# Patient Record
Sex: FEMALE | Race: WHITE | NOT HISPANIC OR LATINO
[De-identification: names, ages, dates, MRNs, and addresses within clinical notes are randomized per-mention and may not be internally consistent; named-entity substitution may affect disease eponyms.]

---

## 2018-03-08 ENCOUNTER — APPOINTMENT (OUTPATIENT)
Dept: SPINE | Facility: CLINIC | Age: 63
End: 2018-03-08
Payer: COMMERCIAL

## 2018-03-08 ENCOUNTER — INPATIENT (INPATIENT)
Facility: HOSPITAL | Age: 63
LOS: 6 days | Discharge: NURSING, REHAB OR LONG-TERM CARE | DRG: 95 | End: 2018-03-15
Attending: PSYCHIATRY & NEUROLOGY | Admitting: INTERNAL MEDICINE
Payer: COMMERCIAL

## 2018-03-08 VITALS
HEIGHT: 65 IN | BODY MASS INDEX: 24.99 KG/M2 | DIASTOLIC BLOOD PRESSURE: 70 MMHG | WEIGHT: 150 LBS | SYSTOLIC BLOOD PRESSURE: 120 MMHG

## 2018-03-08 VITALS
HEART RATE: 76 BPM | OXYGEN SATURATION: 97 % | TEMPERATURE: 99 F | WEIGHT: 149.91 LBS | SYSTOLIC BLOOD PRESSURE: 163 MMHG | DIASTOLIC BLOOD PRESSURE: 90 MMHG | RESPIRATION RATE: 18 BRPM

## 2018-03-08 DIAGNOSIS — Z29.9 ENCOUNTER FOR PROPHYLACTIC MEASURES, UNSPECIFIED: ICD-10-CM

## 2018-03-08 DIAGNOSIS — Z82.69 FAMILY HISTORY OF OTHER DISEASES OF THE MUSCULOSKELETAL SYSTEM AND CONNECTIVE TISSUE: ICD-10-CM

## 2018-03-08 DIAGNOSIS — D32.9 BENIGN NEOPLASM OF MENINGES, UNSPECIFIED: ICD-10-CM

## 2018-03-08 DIAGNOSIS — M54.16 RADICULOPATHY, LUMBAR REGION: ICD-10-CM

## 2018-03-08 DIAGNOSIS — F32.9 MAJOR DEPRESSIVE DISORDER, SINGLE EPISODE, UNSPECIFIED: ICD-10-CM

## 2018-03-08 DIAGNOSIS — N94.10 UNSPECIFIED DYSPAREUNIA: ICD-10-CM

## 2018-03-08 DIAGNOSIS — R20.0 ANESTHESIA OF SKIN: ICD-10-CM

## 2018-03-08 DIAGNOSIS — E78.5 HYPERLIPIDEMIA, UNSPECIFIED: ICD-10-CM

## 2018-03-08 DIAGNOSIS — Z98.890 OTHER SPECIFIED POSTPROCEDURAL STATES: Chronic | ICD-10-CM

## 2018-03-08 LAB
ALBUMIN SERPL ELPH-MCNC: 4.3 G/DL — SIGNIFICANT CHANGE UP (ref 3.3–5)
ALP SERPL-CCNC: 90 U/L — SIGNIFICANT CHANGE UP (ref 40–120)
ALT FLD-CCNC: 47 U/L RC — HIGH (ref 10–45)
ANION GAP SERPL CALC-SCNC: 10 MMOL/L — SIGNIFICANT CHANGE UP (ref 5–17)
APTT BLD: 30.1 SEC — SIGNIFICANT CHANGE UP (ref 27.5–37.4)
AST SERPL-CCNC: 35 U/L — SIGNIFICANT CHANGE UP (ref 10–40)
BASOPHILS # BLD AUTO: 0 K/UL — SIGNIFICANT CHANGE UP (ref 0–0.2)
BASOPHILS NFR BLD AUTO: 0.6 % — SIGNIFICANT CHANGE UP (ref 0–2)
BILIRUB SERPL-MCNC: 0.5 MG/DL — SIGNIFICANT CHANGE UP (ref 0.2–1.2)
BUN SERPL-MCNC: 14 MG/DL — SIGNIFICANT CHANGE UP (ref 7–23)
CALCIUM SERPL-MCNC: 9.7 MG/DL — SIGNIFICANT CHANGE UP (ref 8.4–10.5)
CHLORIDE SERPL-SCNC: 108 MMOL/L — SIGNIFICANT CHANGE UP (ref 96–108)
CO2 SERPL-SCNC: 26 MMOL/L — SIGNIFICANT CHANGE UP (ref 22–31)
CREAT SERPL-MCNC: 0.88 MG/DL — SIGNIFICANT CHANGE UP (ref 0.5–1.3)
EOSINOPHIL # BLD AUTO: 0.1 K/UL — SIGNIFICANT CHANGE UP (ref 0–0.5)
EOSINOPHIL NFR BLD AUTO: 3 % — SIGNIFICANT CHANGE UP (ref 0–6)
ERYTHROCYTE [SEDIMENTATION RATE] IN BLOOD: 12 MM/HR — SIGNIFICANT CHANGE UP (ref 0–20)
GLUCOSE SERPL-MCNC: 102 MG/DL — HIGH (ref 70–99)
HCT VFR BLD CALC: 39.1 % — SIGNIFICANT CHANGE UP (ref 34.5–45)
HGB BLD-MCNC: 13.4 G/DL — SIGNIFICANT CHANGE UP (ref 11.5–15.5)
INR BLD: 0.92 RATIO — SIGNIFICANT CHANGE UP (ref 0.88–1.16)
LYMPHOCYTES # BLD AUTO: 1.7 K/UL — SIGNIFICANT CHANGE UP (ref 1–3.3)
LYMPHOCYTES # BLD AUTO: 38.6 % — SIGNIFICANT CHANGE UP (ref 13–44)
MAGNESIUM SERPL-MCNC: 2.2 MG/DL — SIGNIFICANT CHANGE UP (ref 1.6–2.6)
MCHC RBC-ENTMCNC: 32.8 PG — SIGNIFICANT CHANGE UP (ref 27–34)
MCHC RBC-ENTMCNC: 34.3 GM/DL — SIGNIFICANT CHANGE UP (ref 32–36)
MCV RBC AUTO: 95.8 FL — SIGNIFICANT CHANGE UP (ref 80–100)
MONOCYTES # BLD AUTO: 0.5 K/UL — SIGNIFICANT CHANGE UP (ref 0–0.9)
MONOCYTES NFR BLD AUTO: 11.8 % — SIGNIFICANT CHANGE UP (ref 2–14)
NEUTROPHILS # BLD AUTO: 2 K/UL — SIGNIFICANT CHANGE UP (ref 1.8–7.4)
NEUTROPHILS NFR BLD AUTO: 46 % — SIGNIFICANT CHANGE UP (ref 43–77)
PHOSPHATE SERPL-MCNC: 3.1 MG/DL — SIGNIFICANT CHANGE UP (ref 2.5–4.5)
PLATELET # BLD AUTO: 237 K/UL — SIGNIFICANT CHANGE UP (ref 150–400)
POTASSIUM SERPL-MCNC: 4.6 MMOL/L — SIGNIFICANT CHANGE UP (ref 3.5–5.3)
POTASSIUM SERPL-SCNC: 4.6 MMOL/L — SIGNIFICANT CHANGE UP (ref 3.5–5.3)
PROT SERPL-MCNC: 7.4 G/DL — SIGNIFICANT CHANGE UP (ref 6–8.3)
PROTHROM AB SERPL-ACNC: 10 SEC — SIGNIFICANT CHANGE UP (ref 9.8–12.7)
RBC # BLD: 4.08 M/UL — SIGNIFICANT CHANGE UP (ref 3.8–5.2)
RBC # FLD: 12.5 % — SIGNIFICANT CHANGE UP (ref 10.3–14.5)
SODIUM SERPL-SCNC: 144 MMOL/L — SIGNIFICANT CHANGE UP (ref 135–145)
WBC # BLD: 4.4 K/UL — SIGNIFICANT CHANGE UP (ref 3.8–10.5)
WBC # FLD AUTO: 4.4 K/UL — SIGNIFICANT CHANGE UP (ref 3.8–10.5)

## 2018-03-08 PROCEDURE — 93010 ELECTROCARDIOGRAM REPORT: CPT

## 2018-03-08 PROCEDURE — 99203 OFFICE O/P NEW LOW 30 MIN: CPT

## 2018-03-08 PROCEDURE — 99222 1ST HOSP IP/OBS MODERATE 55: CPT | Mod: AI,GC

## 2018-03-08 PROCEDURE — 99285 EMERGENCY DEPT VISIT HI MDM: CPT

## 2018-03-08 RX ORDER — ACETAMINOPHEN 500 MG
650 TABLET ORAL EVERY 6 HOURS
Qty: 0 | Refills: 0 | Status: DISCONTINUED | OUTPATIENT
Start: 2018-03-08 | End: 2018-03-15

## 2018-03-08 RX ORDER — CHOLECALCIFEROL (VITAMIN D3) 125 MCG
2000 CAPSULE ORAL EVERY 24 HOURS
Qty: 0 | Refills: 0 | Status: DISCONTINUED | OUTPATIENT
Start: 2018-03-08 | End: 2018-03-15

## 2018-03-08 RX ORDER — BUPROPION HYDROCHLORIDE 150 MG/1
225 TABLET, EXTENDED RELEASE ORAL DAILY
Qty: 0 | Refills: 0 | Status: DISCONTINUED | OUTPATIENT
Start: 2018-03-08 | End: 2018-03-08

## 2018-03-08 RX ORDER — BUPROPION HYDROCHLORIDE 150 MG/1
150 TABLET, EXTENDED RELEASE ORAL DAILY
Qty: 0 | Refills: 0 | Status: DISCONTINUED | OUTPATIENT
Start: 2018-03-08 | End: 2018-03-12

## 2018-03-08 RX ORDER — HEPARIN SODIUM 5000 [USP'U]/ML
5000 INJECTION INTRAVENOUS; SUBCUTANEOUS EVERY 8 HOURS
Qty: 0 | Refills: 0 | Status: DISCONTINUED | OUTPATIENT
Start: 2018-03-08 | End: 2018-03-15

## 2018-03-08 RX ORDER — CITALOPRAM 10 MG/1
10 TABLET, FILM COATED ORAL DAILY
Qty: 0 | Refills: 0 | Status: DISCONTINUED | OUTPATIENT
Start: 2018-03-08 | End: 2018-03-15

## 2018-03-08 RX ORDER — SIMVASTATIN 20 MG/1
10 TABLET, FILM COATED ORAL AT BEDTIME
Qty: 0 | Refills: 0 | Status: DISCONTINUED | OUTPATIENT
Start: 2018-03-08 | End: 2018-03-15

## 2018-03-08 RX ADMIN — Medication 25 MILLIGRAM(S): at 23:04

## 2018-03-08 RX ADMIN — CITALOPRAM 10 MILLIGRAM(S): 10 TABLET, FILM COATED ORAL at 23:04

## 2018-03-08 RX ADMIN — BUPROPION HYDROCHLORIDE 150 MILLIGRAM(S): 150 TABLET, EXTENDED RELEASE ORAL at 23:52

## 2018-03-08 RX ADMIN — SIMVASTATIN 10 MILLIGRAM(S): 20 TABLET, FILM COATED ORAL at 23:04

## 2018-03-08 RX ADMIN — Medication 2000 UNIT(S): at 23:04

## 2018-03-08 NOTE — H&P ADULT - HISTORY OF PRESENT ILLNESS
61 yo woman who presents to [facility] on referral by her private neurologist, Dr. Barrios, w/ 1 day onset of gait instability w/ b/l LE weakness. This was preceeded by an URI (2/12/18) while traveling in [facility], treated w/ doxycyline, followed by b/l peripheral neuropathy (tingling) in finger and toes (2/16/18), followed by numbness in the abdominal region (3/5/18). Patient's symptoms refractory to meloxicam and lyrica, which was started 2 days ago. Pertinent hx includes meningioma s/p craniotomy (1993) w/ regular MRI every other year, cervical stenosis (seen on MRI c-spine).     HLD  martinez s/p crani  MDD    post-menopausal

## 2018-03-08 NOTE — ED PROVIDER NOTE - MEDICAL DECISION MAKING DETAILS
61 yo female with numbness tingling; rule out gbs sent in by neurologist; will obtain labs vital capacity nif --> admit

## 2018-03-08 NOTE — H&P ADULT - NSHPLABSRESULTS_GEN_ALL_CORE
LABS personally reviewed by me:    WBC = 4.4    Hb = 13.4    BUN/Cr = 14/0.88    ESR pending      RADIOLOGY:    Outside MRI of Cervical Spine - thyroid lesions, spinal stenosis at multiple levels in cervical spine

## 2018-03-08 NOTE — H&P ADULT - PROBLEM SELECTOR PLAN 1
Pt with numbness and tingling in extremities and in abdomen, unclear etiology, but may be related to spinal stenosis seen on cervical spine MRI; for now pt being continued on lyrica  -on exam pt had decreased reflexes, decreased sensation in lle - possible concern for GBS, neurology is following, case discussed with neurology team  who recommended NIF and vital capacity q4 hrs, and neurochecks q 4hrs, which have been ordered;  will f/u neurology recommendations and defer to their specialized recs  - as per discussion with neuro, also ordered MRI of brain and MRI of thoracic and lumbar spine, will f/u on results, CT head results pending  -f/u ESR/CRP

## 2018-03-08 NOTE — CONSULT NOTE ADULT - SUBJECTIVE AND OBJECTIVE BOX
Neurology Consult    Name: ROSETTE GONZALEZ    HPI: 63 yo woman who presents to [facility] on referral by her private neurologist, Dr. Barrios, w/ 1 day onset of gait instability w/ b/l LE hypo-reflexia. This was preceeded by an URI (2/12/18) while traveling in [facility], treated w/ doxycyline, followed by b/l peripheral neuropathy (tingling) in finger and toes (2/16/18), followed by numbness in the abdominal region (3/5/18). Patient's symptoms refractory to meloxicam and lyrica, which was started 2 days ago. Pertinent hx includes meningioma s/p craniotomy (1993) w/ regular MRI every other year, cervical stenosis (seen on MRI c-spine).     PMH/PSH:   HLD  meningioma s/p craniotomy w/ residual right patellar hyper-reflexia   MDD    post-menopausal     MEDICATIONS  (STANDING):  buPROPion  milliGRAM(s) Oral daily  cholecalciferol 2000 Unit(s) Oral every 24 hours  citalopram 10 milliGRAM(s) Oral daily  heparin  Injectable 5000 Unit(s) SubCutaneous every 8 hours  Ospemifene (Osphena) 60 milliGRAM(s) 60 milliGRAM(s) Oral daily  pregabalin 25 milliGRAM(s) Oral every 24 hours  simvastatin 10 milliGRAM(s) Oral at bedtime    MEDICATIONS  (PRN):  acetaminophen   Tablet 650 milliGRAM(s) Oral every 6 hours PRN For Temp greater than 38 C (100.4 F)  acetaminophen   Tablet. 650 milliGRAM(s) Oral every 6 hours PRN Mild Pain (1 - 3)    Allergies  No Known Allergies    Objective:   Vital Signs Last 24 Hrs  T(C): 37.2 (08 Mar 2018 13:19), Max: 37.2 (08 Mar 2018 13:19)  T(F): 99 (08 Mar 2018 13:19), Max: 99 (08 Mar 2018 13:19)  HR: 76 (08 Mar 2018 13:19) (76 - 76)  BP: 163/90 (08 Mar 2018 13:19) (163/90 - 163/90)  RR: 18 (08 Mar 2018 13:19) (18 - 18)    General Exam:   General appearance: No acute distress                   Neurological Exam:  Mental Status: AAOx3, fluent speech, follows commands    Cranial Nerves: EOMI no nystagmus or diplopia, PERRL, V1-V3 intact, facial symmetry intact, no dysarthria, tongue midline, VFF    Motor: 5/5 throughout. No drift x4    Sensation: Intact to LT and pinprick throughout    Coordination: FTN intact b/l    Reflexes: normal bilateral biceps, brachioradialis, diminished b/l patellar and ankle    Gait: ataxic gait (to the right side). + Romberg.     Other: proprioception decreased right LE vs. normal right LE.       Labs:    03-08    144  |  108  |  14  ----------------------------<  102<H>  4.6   |  26  |  0.88    Ca    9.7      08 Mar 2018 15:02  Phos  3.1     03-08  Mg     2.2     03-08    TPro  7.4  /  Alb  4.3  /  TBili  0.5  /  DBili  x   /  AST  35  /  ALT  47<H>  /  AlkPhos  90  03-08    LIVER FUNCTIONS - ( 08 Mar 2018 15:02 )  Alb: 4.3 g/dL / Pro: 7.4 g/dL / ALK PHOS: 90 U/L / ALT: 47 U/L RC / AST: 35 U/L / GGT: x           CBC Full  -  ( 08 Mar 2018 15:02 )  WBC Count : 4.4 K/uL  Hemoglobin : 13.4 g/dL  Hematocrit : 39.1 %  Platelet Count - Automated : 237 K/uL  Mean Cell Volume : 95.8 fl  Mean Cell Hemoglobin : 32.8 pg  Mean Cell Hemoglobin Concentration : 34.3 gm/dL  Auto Neutrophil # : 2.0 K/uL  Auto Lymphocyte # : 1.7 K/uL  Auto Monocyte # : 0.5 K/uL  Auto Eosinophil # : 0.1 K/uL  Auto Basophil # : 0.0 K/uL  Auto Neutrophil % : 46.0 %  Auto Lymphocyte % : 38.6 %  Auto Monocyte % : 11.8 %  Auto Eosinophil % : 3.0 %  Auto Basophil % : 0.6 %    Radiology Neurology Consult    Name: ROSETTE GONZALEZ    HPI: 63 yo woman who presents to [facility] on referral by her private neurologist, Dr. Barrios, w/ 1 day onset of gait instability w/ b/l LE hypo-reflexia. This was preceeded by an URI (2/12/18) while traveling in [facility], treated w/ doxycyline, followed by b/l peripheral neuropathy (tingling) in finger and toes (2/16/18), followed by numbness in the abdominal region (3/5/18). Patient's symptoms refractory to meloxicam and lyrica, which was started 2 days ago. Pertinent hx includes meningioma s/p craniotomy (1993) w/ regular MRI every other year, cervical stenosis (seen on MRI c-spine). Patient noted that while on her trip in [place], she did go skiing w/ multiple subsequent falls however does not recall any particular falls resulting in any significant trauma..     PMH/PSH:   HLD  meningioma s/p craniotomy w/ residual right patellar hyper-reflexia   MDD    post-menopausal     MEDICATIONS  (STANDING):  buPROPion  milliGRAM(s) Oral daily  cholecalciferol 2000 Unit(s) Oral every 24 hours  citalopram 10 milliGRAM(s) Oral daily  heparin  Injectable 5000 Unit(s) SubCutaneous every 8 hours  Ospemifene (Osphena) 60 milliGRAM(s) 60 milliGRAM(s) Oral daily  pregabalin 25 milliGRAM(s) Oral every 24 hours  simvastatin 10 milliGRAM(s) Oral at bedtime    MEDICATIONS  (PRN):  acetaminophen   Tablet 650 milliGRAM(s) Oral every 6 hours PRN For Temp greater than 38 C (100.4 F)  acetaminophen   Tablet. 650 milliGRAM(s) Oral every 6 hours PRN Mild Pain (1 - 3)    Allergies  No Known Allergies    Objective:   Vital Signs Last 24 Hrs  T(C): 37.2 (08 Mar 2018 13:19), Max: 37.2 (08 Mar 2018 13:19)  T(F): 99 (08 Mar 2018 13:19), Max: 99 (08 Mar 2018 13:19)  HR: 76 (08 Mar 2018 13:19) (76 - 76)  BP: 163/90 (08 Mar 2018 13:19) (163/90 - 163/90)  RR: 18 (08 Mar 2018 13:19) (18 - 18)    General Exam:   General appearance: No acute distress                   Neurological Exam:  Mental Status: AAOx3, fluent speech, follows commands    Cranial Nerves: EOMI no nystagmus or diplopia, PERRL, V1-V3 intact, facial symmetry intact, no dysarthria, tongue midline, VFF    Motor: 5/5 throughout. No drift x4    Sensation: Intact to LT and pinprick throughout    Coordination: FTN intact b/l    Reflexes: normal bilateral biceps, brachioradialis, diminished b/l patellar and ankle    Gait: ataxic gait (to the right side). + Romberg.     Other: proprioception decreased right LE vs. normal right LE.       Labs:    03-08    144  |  108  |  14  ----------------------------<  102<H>  4.6   |  26  |  0.88    Ca    9.7      08 Mar 2018 15:02  Phos  3.1     03-08  Mg     2.2     03-08    TPro  7.4  /  Alb  4.3  /  TBili  0.5  /  DBili  x   /  AST  35  /  ALT  47<H>  /  AlkPhos  90  03-08    LIVER FUNCTIONS - ( 08 Mar 2018 15:02 )  Alb: 4.3 g/dL / Pro: 7.4 g/dL / ALK PHOS: 90 U/L / ALT: 47 U/L RC / AST: 35 U/L / GGT: x           CBC Full  -  ( 08 Mar 2018 15:02 )  WBC Count : 4.4 K/uL  Hemoglobin : 13.4 g/dL  Hematocrit : 39.1 %  Platelet Count - Automated : 237 K/uL  Mean Cell Volume : 95.8 fl  Mean Cell Hemoglobin : 32.8 pg  Mean Cell Hemoglobin Concentration : 34.3 gm/dL  Auto Neutrophil # : 2.0 K/uL  Auto Lymphocyte # : 1.7 K/uL  Auto Monocyte # : 0.5 K/uL  Auto Eosinophil # : 0.1 K/uL  Auto Basophil # : 0.0 K/uL  Auto Neutrophil % : 46.0 %  Auto Lymphocyte % : 38.6 %  Auto Monocyte % : 11.8 %  Auto Eosinophil % : 3.0 %  Auto Basophil % : 0.6 %    Radiology

## 2018-03-08 NOTE — CONSULT NOTE ADULT - ASSESSMENT
63 yo woman who presents to [facility] on referral by her private neurologist, Dr. Barrios, w/ 1 day onset of gait instability w/ b/l LE hypo-reflexia. This was preceeded by an URI (2/12/18) while traveling in [facility], treated w/ doxycyline, followed by b/l peripheral neuropathy (tingling) in finger and toes (2/16/18), followed by numbness in the abdominal region (3/5/18). Patient's symptoms refractory to meloxicam and lyrica, which was started 2 days ago. Pertinent hx includes meningioma s/p craniotomy (1993) w/ regular MRI every other year, cervical stenosis (seen on MRI c-spine). 61 yo woman who presents to [facility] on referral by her private neurologist, Dr. Barrios, w/ 1 day onset of gait instability w/ b/l LE hypo-reflexia. This was preceeded by an URI (2/12/18) while traveling in [facility], treated w/ doxycyline, followed by b/l peripheral neuropathy (tingling) in finger and toes (2/16/18), followed by numbness in the abdominal region (3/5/18). Patient's symptoms refractory to meloxicam and lyrica, which was started 2 days ago. Pertinent hx includes meningioma s/p craniotomy (1993) w/ regular MRI every other year, cervical stenosis (seen on MRI c-spine). Patient noted that while on her trip in [place], she did go skiing w/ multiple subsequent falls however does not recall any particular falls resulting in any significant trauma.

## 2018-03-08 NOTE — H&P ADULT - HISTORY OF PRESENT ILLNESS
63 y/o f w/ PMH of meningioma 63 y/o f w/ PMH of meningioma s/p craniotomy, HLD, depression p/w symptoms of numbness and tingling in hands and feet for the last couple of weeks, complicated by weakness, unsteadiness, and difficulty walking.  Pt initially had upper respiratory symptoms while in [place], and was diagnosed at a walk-in-clinic with a walking pneumonia, for which she was started on doxycycline.  Pt states that the symptoms of numbness and tingling started shortly after then.  They were initially localized ot her hands and feet, but eventually included her abdomen.  She did not have any n/v, diarrhea.  She noticed in the last few days she was also weaker, and had more difficulty with ambulation, and tingling and numbness had gotten worse.  She went ot he her neurosurgeon, who found on reflex exam, that reflexes that are usually brisk for her were diminished.  She had an outpt MRI of cervical spine that showed multiple areas of stenoses.  Dr. Weston was concerned about the possibility of GBS, and her sent to the ED for further work up.    In the ED, pt had a CT head, and was seen by neurology, who recommended MRI of head and T + L spine. 63 y/o f w/ PMH of meningioma s/p craniotomy, HLD, depression p/w symptoms of numbness and tingling in hands and feet for the last couple of weeks, complicated by weakness, unsteadiness, and difficulty walking.  Pt initially had upper respiratory symptoms while in [place], and was diagnosed at a walk-in-clinic with a walking pneumonia, for which she was started on doxycycline.  Pt states that the symptoms of numbness and tingling started shortly after then.  They were initially localized ot her hands and feet, but eventually included her abdomen.  She did not have any n/v, diarrhea.  She noticed in the last few days she was also weaker, and had more difficulty with ambulation, and tingling and numbness had gotten worse.  She went ot he her neurosurgeon, who found on reflex exam, that reflexes that are usually brisk for her were diminished.  She had an outpt MRI of cervical spine that showed multiple areas of stenoses.  Dr. Weston was concerned about the possibility of GBS, and sent her to the ED for further work up.    In the ED, pt had a CT head, and was seen by neurology, who recommended MRI of head and T + L spine.

## 2018-03-08 NOTE — H&P ADULT - FAMILY HISTORY
Father  Still living? Unknown  Family history of spinal stenosis, Age at diagnosis: Age Unknown  Family history of prostate cancer in father, Age at diagnosis: Age Unknown  Family history of cardiovascular disease, Age at diagnosis: Age Unknown     Mother  Still living? Unknown  Family history of cardiovascular disease, Age at diagnosis: Age Unknown

## 2018-03-08 NOTE — ED PROVIDER NOTE - ATTENDING CONTRIBUTION TO CARE
63 y/o female with a h/o distant meningioma who presents with complaints of numbness/tingling, first of her hands, then feet, then around her abdomen. This has more recently been associated with weakness and an unsteadiness while walking. Prior to onset of these symptoms, she had had a URI. Evaluated on an out-patient basis today by Neurology, she was referred to the ED for further evaluation. On exam, she appears very well and comfortable. VSs noted, neck supple, lungs CTA, cardiac sounds s/ audible m/r/g, abdomen soft, NT/ND, extremities s/ asymmetry, skin s/ rash and neurologically s/ focal deficit (reflexes not checked by me personally). Screening labs unremarkable. Discussed with her son a physician at [facility]. Patient is to be admitted for additional investigation/intervention with concern expressed for possible GBS. No clinical evidence at this time of respiratory compromise whatsoever. Suitable for admission to the IM floor.

## 2018-03-08 NOTE — ED ADULT TRIAGE NOTE - CHIEF COMPLAINT QUOTE
sent in by MD to r/o Archana Shetty, c/o numbness and tingling to hands x 3 weeks, now experiencing numbness and tingling to abdomen with unsteady gait.

## 2018-03-08 NOTE — CONSULT NOTE ADULT - PROBLEM SELECTOR RECOMMENDATION 9
based upon HPI and symptomatology, need to r/o demyelinating disease vs. other cord pathology (i.e. GBS, Transverse Myelitis)   Plan:  -Medicine team would like to admit patient to their service   -NIFs/VCs q4hrs   -frequent neuro checks  -MRI brain w/ and w/o contrast  -MRI T/L spine w/ and w/o contrast  -ESR/CRP  -pending MRI, may need further evaluation w/ LP if imaging inconclusive

## 2018-03-08 NOTE — ED ADULT NURSE NOTE - OBJECTIVE STATEMENT
Pt presents to Ed with c/o numbness and tingling sensation. Pt with hx of Pt presents to Ed with c/o numbness and tingling sensation. Pt with hx of spinal stenosis and meningioma craniotomy   in 1993 reports experiencing numbness and tingling sensation to upper arms and legs sent by md to be evaluated  for Guillain [place]. Pt A&Ox3 no chest pain sob, visual changes or diff urinating or breathing, pt reports unsteady gait.   Vital Capacity done in Ed, VC= 2.3L, NIF/- 50. RAC 20g ivl placed labs drawn and sent. Pt presents to Ed with c/o numbness and tingling sensation. Pt with hx of spinal stenosis and meningioma craniotomy   in 1993 reports experiencing numbness and tingling sensation to upper arms and legs sent by md to be evaluated  for Guillain [place]. Pt A&Ox3 no chest pain sob, visual changes or diff urinating or breathing, pt reports unsteady gait.   Vital Capacity done in Ed, VC= 2.3L, NIF/- 50. RAC 20g ivl placed labs drawn and sent. Patients speech clear, full strength   and sensation to b/l upper and lower extremities.

## 2018-03-08 NOTE — H&P ADULT - NSHPREVIEWOFSYSTEMS_GEN_ALL_CORE
+ numbness and tingling in extremities  + abdominal tingling and numbness  + weakness  + decreased sensation in lle    - cp, palpitations  - sob, cough  - n/v, diarrhea, constipation  - bleeding, epistaxsis  - allergies, rhinorrhea  - ha, vision changes

## 2018-03-08 NOTE — H&P ADULT - NSHPPHYSICALEXAM_GEN_ALL_CORE
Gen - NAD  HEENT - perrla, eomi  CV - s1 and s2, rrr, no m/r.g  LUNGS - CTAB  Abd - soft, nd, nt, +bs  Ext - no c/c/e  Neuro - decreased sensation in lle, decreased reflexes in lower ext, and decreased proprioception

## 2018-03-08 NOTE — ED PROVIDER NOTE - OBJECTIVE STATEMENT
61 yo female with hx of meningioma s/p craniotomy in 1993 (gets mri every other year and last one was this december17)    presenting with.  in week of 2/12, had upper respiratory infection.  came back from [place] on 2/9.  was put on doxy by urgent care on 2/16.  2/16, patient started having tingling on her fingers and feet.  3 days ago, patient started having numbness around her abdomen.  yesterday, patient started noticing that her gait was unsteady.  also endorses weakness which has gotten progressively worse.  took meloxicam two tabs twice a day and lyrica (started two days ago) with minimal relief of symptoms.  on wellbutrin, simvastatin, osphena and celexa.  had mri on 2/24 of cervical spine which showed significant canal stenosis.  denies recent trauma but went skiing in [place].  denies urinary incontinence.  pcp- mahin hansen   neurologist karina hawthorne

## 2018-03-08 NOTE — H&P ADULT - PROBLEM SELECTOR PLAN 4
cont pt on home wellbutrin and celexa; wellbutrin dose at home 225mg po qd, but pharmacy here does not carry dose, so ordered for 150mg po qd

## 2018-03-08 NOTE — H&P ADULT - ASSESSMENT
61 y/o f w/ PMH of meningioma s/p craniotomy, HLD, depression p/w symptoms of numbness and tingling in extremities, weakness, and difficulty ambulating

## 2018-03-09 DIAGNOSIS — E04.1 NONTOXIC SINGLE THYROID NODULE: ICD-10-CM

## 2018-03-09 LAB
ALBUMIN SERPL ELPH-MCNC: 3.5 G/DL — SIGNIFICANT CHANGE UP (ref 3.3–5)
ALP SERPL-CCNC: 69 U/L — SIGNIFICANT CHANGE UP (ref 40–120)
ALT FLD-CCNC: 40 U/L RC — SIGNIFICANT CHANGE UP (ref 10–45)
ANION GAP SERPL CALC-SCNC: 11 MMOL/L — SIGNIFICANT CHANGE UP (ref 5–17)
APPEARANCE CSF: CLEAR — SIGNIFICANT CHANGE UP
APPEARANCE CSF: CLEAR — SIGNIFICANT CHANGE UP
APPEARANCE SPUN FLD: COLORLESS — SIGNIFICANT CHANGE UP
APPEARANCE SPUN FLD: COLORLESS — SIGNIFICANT CHANGE UP
AST SERPL-CCNC: 35 U/L — SIGNIFICANT CHANGE UP (ref 10–40)
BASOPHILS # BLD AUTO: 0.04 K/UL — SIGNIFICANT CHANGE UP (ref 0–0.2)
BASOPHILS NFR BLD AUTO: 1 % — SIGNIFICANT CHANGE UP (ref 0–2)
BILIRUB SERPL-MCNC: 0.6 MG/DL — SIGNIFICANT CHANGE UP (ref 0.2–1.2)
BUN SERPL-MCNC: 14 MG/DL — SIGNIFICANT CHANGE UP (ref 7–23)
CALCIUM SERPL-MCNC: 8.8 MG/DL — SIGNIFICANT CHANGE UP (ref 8.4–10.5)
CHLORIDE SERPL-SCNC: 105 MMOL/L — SIGNIFICANT CHANGE UP (ref 96–108)
CO2 SERPL-SCNC: 24 MMOL/L — SIGNIFICANT CHANGE UP (ref 22–31)
COLOR CSF: SIGNIFICANT CHANGE UP
COLOR CSF: SIGNIFICANT CHANGE UP
CREAT SERPL-MCNC: 0.95 MG/DL — SIGNIFICANT CHANGE UP (ref 0.5–1.3)
CSF PCR RESULT: SIGNIFICANT CHANGE UP
EOSINOPHIL # BLD AUTO: 0.17 K/UL — SIGNIFICANT CHANGE UP (ref 0–0.5)
EOSINOPHIL NFR BLD AUTO: 4.1 % — SIGNIFICANT CHANGE UP (ref 0–6)
GLUCOSE CSF-MCNC: 64 MG/DL — SIGNIFICANT CHANGE UP (ref 40–70)
GLUCOSE SERPL-MCNC: 91 MG/DL — SIGNIFICANT CHANGE UP (ref 70–99)
GRAM STN FLD: SIGNIFICANT CHANGE UP
HCT VFR BLD CALC: 37.1 % — SIGNIFICANT CHANGE UP (ref 34.5–45)
HCV AB S/CO SERPL IA: 0.11 S/CO — SIGNIFICANT CHANGE UP
HCV AB SERPL-IMP: SIGNIFICANT CHANGE UP
HGB BLD-MCNC: 11.5 G/DL — SIGNIFICANT CHANGE UP (ref 11.5–15.5)
IMM GRANULOCYTES NFR BLD AUTO: 0.2 % — SIGNIFICANT CHANGE UP (ref 0–1.5)
LYMPHOCYTES # BLD AUTO: 1.89 K/UL — SIGNIFICANT CHANGE UP (ref 1–3.3)
LYMPHOCYTES # BLD AUTO: 45.1 % — HIGH (ref 13–44)
LYMPHOCYTES # CSF: 48 % — SIGNIFICANT CHANGE UP (ref 40–80)
LYMPHOCYTES # CSF: 61 % — SIGNIFICANT CHANGE UP (ref 40–80)
MCHC RBC-ENTMCNC: 29.9 PG — SIGNIFICANT CHANGE UP (ref 27–34)
MCHC RBC-ENTMCNC: 31 GM/DL — LOW (ref 32–36)
MCV RBC AUTO: 96.4 FL — SIGNIFICANT CHANGE UP (ref 80–100)
MONOCYTES # BLD AUTO: 0.69 K/UL — SIGNIFICANT CHANGE UP (ref 0–0.9)
MONOCYTES NFR BLD AUTO: 16.5 % — HIGH (ref 2–14)
MONOS+MACROS NFR CSF: 39 % — SIGNIFICANT CHANGE UP (ref 15–45)
MONOS+MACROS NFR CSF: 52 % — HIGH (ref 15–45)
NEUTROPHILS # BLD AUTO: 1.39 K/UL — LOW (ref 1.8–7.4)
NEUTROPHILS # CSF: 0 % — SIGNIFICANT CHANGE UP (ref 0–6)
NEUTROPHILS # CSF: 0 % — SIGNIFICANT CHANGE UP (ref 0–6)
NEUTROPHILS NFR BLD AUTO: 33.1 % — LOW (ref 43–77)
NRBC NFR CSF: 1 /UL — SIGNIFICANT CHANGE UP (ref 0–5)
NRBC NFR CSF: 1 /UL — SIGNIFICANT CHANGE UP (ref 0–5)
PLATELET # BLD AUTO: 218 K/UL — SIGNIFICANT CHANGE UP (ref 150–400)
POTASSIUM SERPL-MCNC: 4 MMOL/L — SIGNIFICANT CHANGE UP (ref 3.5–5.3)
POTASSIUM SERPL-SCNC: 4 MMOL/L — SIGNIFICANT CHANGE UP (ref 3.5–5.3)
PROT CSF-MCNC: 64 MG/DL — HIGH (ref 15–45)
PROT SERPL-MCNC: 6.2 G/DL — SIGNIFICANT CHANGE UP (ref 6–8.3)
RBC # BLD: 3.85 M/UL — SIGNIFICANT CHANGE UP (ref 3.8–5.2)
RBC # CSF: 0 /UL — SIGNIFICANT CHANGE UP (ref 0–0)
RBC # CSF: 1 /UL — HIGH (ref 0–0)
RBC # FLD: 13.9 % — SIGNIFICANT CHANGE UP (ref 10.3–14.5)
SODIUM SERPL-SCNC: 140 MMOL/L — SIGNIFICANT CHANGE UP (ref 135–145)
SPECIMEN SOURCE: SIGNIFICANT CHANGE UP
T3FREE SERPL-MCNC: 3.32 PG/ML — SIGNIFICANT CHANGE UP (ref 1.8–4.6)
T4 FREE SERPL-MCNC: 1.1 NG/DL — SIGNIFICANT CHANGE UP (ref 0.9–1.8)
TSH SERPL-MCNC: 2.37 UIU/ML — SIGNIFICANT CHANGE UP (ref 0.27–4.2)
TUBE TYPE: SIGNIFICANT CHANGE UP
TUBE TYPE: SIGNIFICANT CHANGE UP
WBC # BLD: 4.19 K/UL — SIGNIFICANT CHANGE UP (ref 3.8–10.5)
WBC # FLD AUTO: 4.19 K/UL — SIGNIFICANT CHANGE UP (ref 3.8–10.5)

## 2018-03-09 PROCEDURE — 72148 MRI LUMBAR SPINE W/O DYE: CPT | Mod: 26

## 2018-03-09 PROCEDURE — 76536 US EXAM OF HEAD AND NECK: CPT | Mod: 26

## 2018-03-09 PROCEDURE — 72146 MRI CHEST SPINE W/O DYE: CPT | Mod: 26

## 2018-03-09 PROCEDURE — 70551 MRI BRAIN STEM W/O DYE: CPT | Mod: 26

## 2018-03-09 PROCEDURE — 70450 CT HEAD/BRAIN W/O DYE: CPT | Mod: 26

## 2018-03-09 PROCEDURE — 72141 MRI NECK SPINE W/O DYE: CPT | Mod: 26

## 2018-03-09 PROCEDURE — 99233 SBSQ HOSP IP/OBS HIGH 50: CPT

## 2018-03-09 RX ORDER — SODIUM CHLORIDE 9 MG/ML
250 INJECTION INTRAMUSCULAR; INTRAVENOUS; SUBCUTANEOUS ONCE
Qty: 0 | Refills: 0 | Status: COMPLETED | OUTPATIENT
Start: 2018-03-09 | End: 2018-03-10

## 2018-03-09 RX ORDER — BUPROPION HYDROCHLORIDE 150 MG/1
225 TABLET, EXTENDED RELEASE ORAL
Qty: 0 | Refills: 0 | COMMUNITY

## 2018-03-09 RX ORDER — CITALOPRAM 10 MG/1
1 TABLET, FILM COATED ORAL
Qty: 0 | Refills: 0 | COMMUNITY

## 2018-03-09 RX ORDER — ACETAMINOPHEN 500 MG
325 TABLET ORAL DAILY
Qty: 0 | Refills: 0 | Status: DISCONTINUED | OUTPATIENT
Start: 2018-03-09 | End: 2018-03-15

## 2018-03-09 RX ORDER — DIPHENHYDRAMINE HCL 50 MG
25 CAPSULE ORAL DAILY
Qty: 0 | Refills: 0 | Status: COMPLETED | OUTPATIENT
Start: 2018-03-09 | End: 2018-03-13

## 2018-03-09 RX ORDER — IMMUNE GLOBULIN,GAMMA(IGG) 5 %
25 VIAL (ML) INTRAVENOUS DAILY
Qty: 0 | Refills: 0 | Status: DISCONTINUED | OUTPATIENT
Start: 2018-03-09 | End: 2018-03-12

## 2018-03-09 RX ORDER — SIMVASTATIN 20 MG/1
1 TABLET, FILM COATED ORAL
Qty: 0 | Refills: 0 | COMMUNITY

## 2018-03-09 RX ORDER — CHOLECALCIFEROL (VITAMIN D3) 125 MCG
2 CAPSULE ORAL
Qty: 0 | Refills: 0 | COMMUNITY

## 2018-03-09 RX ORDER — OSPEMIFENE 60 MG/1
1 TABLET, FILM COATED ORAL
Qty: 0 | Refills: 0 | COMMUNITY

## 2018-03-09 RX ADMIN — BUPROPION HYDROCHLORIDE 150 MILLIGRAM(S): 150 TABLET, EXTENDED RELEASE ORAL at 18:11

## 2018-03-09 RX ADMIN — CITALOPRAM 10 MILLIGRAM(S): 10 TABLET, FILM COATED ORAL at 13:03

## 2018-03-09 RX ADMIN — Medication 25 MILLIGRAM(S): at 22:29

## 2018-03-09 RX ADMIN — Medication 25 MILLIGRAM(S): at 22:38

## 2018-03-09 RX ADMIN — SIMVASTATIN 10 MILLIGRAM(S): 20 TABLET, FILM COATED ORAL at 22:37

## 2018-03-09 RX ADMIN — Medication 50 GRAM(S): at 23:04

## 2018-03-09 RX ADMIN — Medication 325 MILLIGRAM(S): at 22:29

## 2018-03-09 RX ADMIN — Medication 2000 UNIT(S): at 14:49

## 2018-03-09 NOTE — PROVIDER CONTACT NOTE (OTHER) - ASSESSMENT
Pt states she did not feel contrast portion of MRI was necessary since the non-contrast portion was completed.  Pt refused contrast portion of MRI.

## 2018-03-09 NOTE — PROGRESS NOTE ADULT - ATTENDING COMMENTS
Case d/w patient at bedside and her son (Harjinder).  d/w NP (Arianne) and neuro attending (Dr. Ortega).  Patient transferred to neuro service today for further management.  Medicine will continue to follow as consulting service for now.    Yuriy Villarreal M.D.  Hospitalist  Pager: [phone]

## 2018-03-09 NOTE — PROGRESS NOTE ADULT - SUBJECTIVE AND OBJECTIVE BOX
Patient is a 62y old  Female who presents with a chief complaint of numbness (08 Mar 2018 18:09)        SUBJECTIVE / OVERNIGHT EVENTS: Patient seen and examined.  Overall, she feels well.  Reports persistent superficial abdominal "numbness" that is circumferential.  Also w/ continued "tingling" of her fingertips.  Feels unsteady when on her feet.     MEDICATIONS  (STANDING):  buPROPion  milliGRAM(s) Oral daily  cholecalciferol 2000 Unit(s) Oral every 24 hours  citalopram 10 milliGRAM(s) Oral daily  heparin  Injectable 5000 Unit(s) SubCutaneous every 8 hours  Ospemifene (Osphena) 60 milliGRAM(s) 60 milliGRAM(s) Oral daily  pregabalin 25 milliGRAM(s) Oral every 24 hours  simvastatin 10 milliGRAM(s) Oral at bedtime    MEDICATIONS  (PRN):  acetaminophen   Tablet 650 milliGRAM(s) Oral every 6 hours PRN For Temp greater than 38 C (100.4 F)  acetaminophen   Tablet. 650 milliGRAM(s) Oral every 6 hours PRN Mild Pain (1 - 3)      REVIEW OF SYSTEMS      General: No fevers, chills  	  Ophthalmologic: No change in vision    Respiratory and Thorax: No SOB or cough  	  Cardiovascular: No chest pain, palpitations, or LE edema    Gastrointestinal: No abdominal pain, nausea, vomiting, or diarrhea    Genitourinary: No dysuria or polyuria      I&O's Summary    09 Mar 2018 07:01  -  09 Mar 2018 13:04  --------------------------------------------------------  IN: 240 mL / OUT: 0 mL / NET: 240 mL      Vital Signs Last 24 Hrs  T(C): 36.8 (09 Mar 2018 06:18), Max: 37.2 (08 Mar 2018 13:19)  T(F): 98.3 (09 Mar 2018 06:18), Max: 99 (08 Mar 2018 13:19)  HR: 75 (09 Mar 2018 06:18) (69 - 76)  BP: 130/87 (09 Mar 2018 06:18) (123/74 - 163/90)  BP(mean): --  RR: 18 (09 Mar 2018 06:18) (18 - 18)  SpO2: 95% (09 Mar 2018 06:18) (95% - 97%)    PHYSICAL EXAM:    Constitutional: Well-appearing, NAD, lying in bed, appears stated age    Eyes: EOMI, PERRLA, clear conjunctiva    ENMT: No pharyngeal erythema, mucus membranes moist    Neck: No JVD    Back: No spinal tenderness or kyphosis    Respiratory: Lungs clear to auscultation     Cardiovascular: Regular rate and rhythm, S1S2+, no murmurs appreciated.  No LE edema    Gastrointestinal: Soft, non-tender, non-distended, bowel sounds positive all four quadrants    Extremities: no clubbing or cyanosis    Vascular: 2+ pulses radially and dorsalis pedis    Neurological: Alert and oriented to name, place, and date.  Cranial nerves II-XII intact. Strength: 4+/5 R triceps and b/l iliopsoas otherwise 5/5 4x.  DTR 2+ symmetrically, Decreased vibration and proprioception of RLE and RUE.  Gait:  able to heel walk/toe walk, +homberg    Skin: Warm and dry.  No rashes    Lymph Nodes: No cervical lymphadenopathy    Musculoskeletal: No joint swelling or erythema    Psychiatric: Pleasant affect    LABS:                        11.5   4.19  )-----------( 218      ( 09 Mar 2018 08:07 )             37.1     03-09    140  |  105  |  14  ----------------------------<  91  4.0   |  24  |  0.95    Ca    8.8      09 Mar 2018 06:57  Phos  3.1     03-08  Mg     2.2     03-08    TPro  6.2  /  Alb  3.5  /  TBili  0.6  /  DBili  x   /  AST  35  /  ALT  40  /  AlkPhos  69  03-09    PT/INR - ( 08 Mar 2018 15:02 )   PT: 10.0 sec;   INR: 0.92 ratio         PTT - ( 08 Mar 2018 15:02 )  PTT:30.1 sec        RADIOLOGY & ADDITIONAL TESTS:    Imaging Personally Reviewed: [x] Thyroid U/S  10 mm hypoechoic right thyroid nodule with peripheral vascularity   corresponding to nodule noted on MR cervical spine dated 2/24/2018.     Consultant(s) Notes Reviewed:  [x]  Care Discussed with Consultants/Other Providers: [x]

## 2018-03-09 NOTE — PROGRESS NOTE ADULT - PROBLEM SELECTOR PLAN 1
Case discussed extensively w/ neuro attending (Dr. Cee).  DDX includes GBS vs. neuropathy secondary to cervical stenosis.  - Plan for LP this afternoon to assess for albuminocytologic dissociation   - Consider IVIG  - MRI brain and T/L/S spine pending

## 2018-03-09 NOTE — PROGRESS NOTE ADULT - ASSESSMENT
63 y/o woman w/ PMH of meningioma s/p craniotomy (1993), HLD, depression p/w symptoms of progressive numbness and tingling in extremities, weakness, and difficulty ambulating over the past few weeks, admitted with concern for possible GBS.

## 2018-03-09 NOTE — PROGRESS NOTE ADULT - ASSESSMENT
63yo F presents with paresthesia of UE/LE now face with diminished reflexes, ataxia and weakness, concern is for GBS particularly given her hx of antecedent infection. Transverse myelitis also on differential, however given diminished reflexes I still favor GBS  1. Ct head now, then LP  2. MRI axial with contrast  3. Recommend transferring patient to neurology service for frequent neuro checks  4. NIF/VC  5. check igA level, if csf protein elevated likely will recommend treatment with IVIG.   6. pt  7. further investigations pending LP/MRI

## 2018-03-09 NOTE — PROGRESS NOTE ADULT - SUBJECTIVE AND OBJECTIVE BOX
Patient is a 62y old  Female who presents with a chief complaint of numbness (08 Mar 2018 18:09)    HPI:  see resident note for details, pt c/o paresthesia of hands and feet x 3 weeks, preceded by URI, no neck/back pain, she also reports ataxia of 3 days duration. Numbness has started to affect her face as well. no visual changes, no SOB, denies weakness, no difficulty with speech/swallowing. She was sent in by her neurologist to r/o gbs given her absent patellar reflex which is typically brisk for her      PAST MEDICAL & SURGICAL HISTORY:  Depression  Hyperlipidemia  Meningioma  History of craniotomy: for meningioma    FAMILY HISTORY:  Family history of cardiovascular disease (Father, Mother)  Family history of prostate cancer in father (Father)  Family history of spinal stenosis (Father)    Social Hx:  Nonsmoker, no drug or alcohol use  MEDICATIONS  (STANDING):  buPROPion  milliGRAM(s) Oral daily  cholecalciferol 2000 Unit(s) Oral every 24 hours  citalopram 10 milliGRAM(s) Oral daily  heparin  Injectable 5000 Unit(s) SubCutaneous every 8 hours  Ospemifene (Osphena) 60 milliGRAM(s) 60 milliGRAM(s) Oral daily  pregabalin 25 milliGRAM(s) Oral every 24 hours  simvastatin 10 milliGRAM(s) Oral at bedtime    MEDICATIONS  (PRN):  acetaminophen   Tablet 650 milliGRAM(s) Oral every 6 hours PRN For Temp greater than 38 C (100.4 F)  acetaminophen   Tablet. 650 milliGRAM(s) Oral every 6 hours PRN Mild Pain (1 - 3)    Allergies    No Known Allergies    Intolerances      ROS: Pertinent positives in HPI, all other ROS were reviewed and are negative.    Vital Signs Last 24 Hrs  T(C): 36.8 (09 Mar 2018 06:18), Max: 37.2 (08 Mar 2018 13:19)  T(F): 98.3 (09 Mar 2018 06:18), Max: 99 (08 Mar 2018 13:19)  HR: 75 (09 Mar 2018 06:18) (69 - 76)  BP: 130/87 (09 Mar 2018 06:18) (123/74 - 163/90)  BP(mean): --  RR: 18 (09 Mar 2018 06:18) (18 - 18)  SpO2: 95% (09 Mar 2018 06:18) (95% - 97%)  GENERAL EXAM:  Constitutional: awake and alert. NAD  HEENT: NCAT  Neck: Supple  Extremities: no edema, no cyanosis  Musculoskeletal: no joint swelling/tenderness, no abnormal movements  Skin: no rashes  NEUROLOGICAL EXAM:  MS: AAOX3, fluent, attends b/l; recent and remote memory intact; normal attention, language and fund of knowledge.   CN: VFF, EOMI, PERRL, no ANA, no APD,  V1-3 intact, no facial asymmetry, t/p midline, SCM/trap intact.  Fundi: no papilledema.  Motor: Strength: 4+/5 R triceps and bl ilippsoas otherwise 5/5 4x. Tone: normal. Bulk: normal. DTR 2+ symm.  Plantar flex b/l. Sensation: dec LT/PP bl le and R thenar emminence, dec vibration and prop of RLE and RUE  Coordination: intact 4x.   Gait:  able to heel walk/toe walk, pos rhomberg    Labs:                         11.5   4.19  )-----------( 218      ( 09 Mar 2018 08:07 )             37.1     03-09    140  |  105  |  14  ----------------------------<  91  4.0   |  24  |  0.95    Ca    8.8      09 Mar 2018 06:57  Phos  3.1     03-08  Mg     2.2     03-08    TPro  6.2  /  Alb  3.5  /  TBili  0.6  /  DBili  x   /  AST  35  /  ALT  40  /  AlkPhos  69  03-09    PT/INR - ( 08 Mar 2018 15:02 )   PT: 10.0 sec;   INR: 0.92 ratio         PTT - ( 08 Mar 2018 15:02 )  PTT:30.1 sec          CAPILLARY BLOOD GLUCOSE        LIVER FUNCTIONS - ( 09 Mar 2018 06:57 )  Alb: 3.5 g/dL / Pro: 6.2 g/dL / ALK PHOS: 69 U/L / ALT: 40 U/L RC / AST: 35 U/L / GGT: x             Radiology:  -CT Head:  -MRI brain  -MRA brain/Carotids  -EEG

## 2018-03-10 DIAGNOSIS — G95.9 DISEASE OF SPINAL CORD, UNSPECIFIED: ICD-10-CM

## 2018-03-10 LAB
24R-OH-CALCIDIOL SERPL-MCNC: 31.6 NG/ML — SIGNIFICANT CHANGE UP (ref 30–80)
ALBUMIN FLD-MCNC: <0.5 G/DL — SIGNIFICANT CHANGE UP
ANION GAP SERPL CALC-SCNC: 11 MMOL/L — SIGNIFICANT CHANGE UP (ref 5–17)
ANION GAP SERPL CALC-SCNC: 14 MMOL/L — SIGNIFICANT CHANGE UP (ref 5–17)
BUN SERPL-MCNC: 15 MG/DL — SIGNIFICANT CHANGE UP (ref 7–23)
BUN SERPL-MCNC: 20 MG/DL — SIGNIFICANT CHANGE UP (ref 7–23)
CALCIUM SERPL-MCNC: 9.3 MG/DL — SIGNIFICANT CHANGE UP (ref 8.4–10.5)
CALCIUM SERPL-MCNC: 9.6 MG/DL — SIGNIFICANT CHANGE UP (ref 8.4–10.5)
CHLORIDE SERPL-SCNC: 101 MMOL/L — SIGNIFICANT CHANGE UP (ref 96–108)
CHLORIDE SERPL-SCNC: 104 MMOL/L — SIGNIFICANT CHANGE UP (ref 96–108)
CHOLEST SERPL-MCNC: 162 MG/DL — SIGNIFICANT CHANGE UP (ref 10–199)
CO2 SERPL-SCNC: 21 MMOL/L — LOW (ref 22–31)
CO2 SERPL-SCNC: 23 MMOL/L — SIGNIFICANT CHANGE UP (ref 22–31)
CREAT SERPL-MCNC: 0.82 MG/DL — SIGNIFICANT CHANGE UP (ref 0.5–1.3)
CREAT SERPL-MCNC: 0.82 MG/DL — SIGNIFICANT CHANGE UP (ref 0.5–1.3)
GLUCOSE SERPL-MCNC: 173 MG/DL — HIGH (ref 70–99)
GLUCOSE SERPL-MCNC: 95 MG/DL — SIGNIFICANT CHANGE UP (ref 70–99)
HCT VFR BLD CALC: 35.6 % — SIGNIFICANT CHANGE UP (ref 34.5–45)
HCT VFR BLD CALC: 39.4 % — SIGNIFICANT CHANGE UP (ref 34.5–45)
HDLC SERPL-MCNC: 62 MG/DL — SIGNIFICANT CHANGE UP (ref 40–125)
HGB BLD-MCNC: 11.9 G/DL — SIGNIFICANT CHANGE UP (ref 11.5–15.5)
HGB BLD-MCNC: 13.3 G/DL — SIGNIFICANT CHANGE UP (ref 11.5–15.5)
LIPID PNL WITH DIRECT LDL SERPL: 88 MG/DL — SIGNIFICANT CHANGE UP
MCHC RBC-ENTMCNC: 31.4 PG — SIGNIFICANT CHANGE UP (ref 27–34)
MCHC RBC-ENTMCNC: 31.9 PG — SIGNIFICANT CHANGE UP (ref 27–34)
MCHC RBC-ENTMCNC: 33.4 GM/DL — SIGNIFICANT CHANGE UP (ref 32–36)
MCHC RBC-ENTMCNC: 33.8 GM/DL — SIGNIFICANT CHANGE UP (ref 32–36)
MCV RBC AUTO: 93.9 FL — SIGNIFICANT CHANGE UP (ref 80–100)
MCV RBC AUTO: 94.5 FL — SIGNIFICANT CHANGE UP (ref 80–100)
PLATELET # BLD AUTO: 188 K/UL — SIGNIFICANT CHANGE UP (ref 150–400)
PLATELET # BLD AUTO: 221 K/UL — SIGNIFICANT CHANGE UP (ref 150–400)
POTASSIUM SERPL-MCNC: 4 MMOL/L — SIGNIFICANT CHANGE UP (ref 3.5–5.3)
POTASSIUM SERPL-MCNC: 4.2 MMOL/L — SIGNIFICANT CHANGE UP (ref 3.5–5.3)
POTASSIUM SERPL-SCNC: 4 MMOL/L — SIGNIFICANT CHANGE UP (ref 3.5–5.3)
POTASSIUM SERPL-SCNC: 4.2 MMOL/L — SIGNIFICANT CHANGE UP (ref 3.5–5.3)
RBC # BLD: 3.79 M/UL — LOW (ref 3.8–5.2)
RBC # BLD: 4.17 M/UL — SIGNIFICANT CHANGE UP (ref 3.8–5.2)
RBC # FLD: 12.2 % — SIGNIFICANT CHANGE UP (ref 10.3–14.5)
RBC # FLD: 13.8 % — SIGNIFICANT CHANGE UP (ref 10.3–14.5)
SODIUM SERPL-SCNC: 136 MMOL/L — SIGNIFICANT CHANGE UP (ref 135–145)
SODIUM SERPL-SCNC: 138 MMOL/L — SIGNIFICANT CHANGE UP (ref 135–145)
SPECIMEN SOURCE FLD: SIGNIFICANT CHANGE UP
TOTAL CHOLESTEROL/HDL RATIO MEASUREMENT: 2.6 RATIO — LOW (ref 3.3–7.1)
TRIGL SERPL-MCNC: 58 MG/DL — SIGNIFICANT CHANGE UP (ref 10–149)
WBC # BLD: 2.92 K/UL — LOW (ref 3.8–10.5)
WBC # BLD: 4.8 K/UL — SIGNIFICANT CHANGE UP (ref 3.8–10.5)
WBC # FLD AUTO: 2.92 K/UL — LOW (ref 3.8–10.5)
WBC # FLD AUTO: 4.8 K/UL — SIGNIFICANT CHANGE UP (ref 3.8–10.5)

## 2018-03-10 PROCEDURE — 99233 SBSQ HOSP IP/OBS HIGH 50: CPT

## 2018-03-10 RX ORDER — DEXAMETHASONE 0.5 MG/5ML
10 ELIXIR ORAL EVERY 8 HOURS
Qty: 0 | Refills: 0 | Status: DISCONTINUED | OUTPATIENT
Start: 2018-03-10 | End: 2018-03-10

## 2018-03-10 RX ORDER — DEXAMETHASONE 0.5 MG/5ML
10 ELIXIR ORAL EVERY 8 HOURS
Qty: 0 | Refills: 0 | Status: COMPLETED | OUTPATIENT
Start: 2018-03-10 | End: 2018-03-10

## 2018-03-10 RX ORDER — ACETAMINOPHEN 500 MG
325 TABLET ORAL DAILY
Qty: 0 | Refills: 0 | Status: DISCONTINUED | OUTPATIENT
Start: 2018-03-10 | End: 2018-03-15

## 2018-03-10 RX ORDER — DEXAMETHASONE 0.5 MG/5ML
19 ELIXIR ORAL EVERY 8 HOURS
Qty: 0 | Refills: 0 | Status: DISCONTINUED | OUTPATIENT
Start: 2018-03-10 | End: 2018-03-10

## 2018-03-10 RX ORDER — SODIUM CHLORIDE 9 MG/ML
1000 INJECTION INTRAMUSCULAR; INTRAVENOUS; SUBCUTANEOUS
Qty: 0 | Refills: 0 | Status: DISCONTINUED | OUTPATIENT
Start: 2018-03-10 | End: 2018-03-14

## 2018-03-10 RX ADMIN — Medication 25 MILLIGRAM(S): at 18:30

## 2018-03-10 RX ADMIN — Medication 325 MILLIGRAM(S): at 21:06

## 2018-03-10 RX ADMIN — Medication 102 MILLIGRAM(S): at 21:05

## 2018-03-10 RX ADMIN — Medication 50 GRAM(S): at 21:49

## 2018-03-10 RX ADMIN — SODIUM CHLORIDE 250 MILLILITER(S): 9 INJECTION INTRAMUSCULAR; INTRAVENOUS; SUBCUTANEOUS at 00:12

## 2018-03-10 RX ADMIN — CITALOPRAM 10 MILLIGRAM(S): 10 TABLET, FILM COATED ORAL at 13:17

## 2018-03-10 RX ADMIN — SIMVASTATIN 10 MILLIGRAM(S): 20 TABLET, FILM COATED ORAL at 21:05

## 2018-03-10 RX ADMIN — BUPROPION HYDROCHLORIDE 150 MILLIGRAM(S): 150 TABLET, EXTENDED RELEASE ORAL at 13:16

## 2018-03-10 RX ADMIN — Medication 2000 UNIT(S): at 13:17

## 2018-03-10 RX ADMIN — Medication 25 MILLIGRAM(S): at 21:05

## 2018-03-10 RX ADMIN — Medication 102 MILLIGRAM(S): at 06:12

## 2018-03-10 RX ADMIN — Medication 325 MILLIGRAM(S): at 13:17

## 2018-03-10 RX ADMIN — Medication 102 MILLIGRAM(S): at 15:04

## 2018-03-10 NOTE — PHYSICAL THERAPY INITIAL EVALUATION ADULT - ADDITIONAL COMMENTS
Pt lives alone in apt/condo with elevator, PTA ind amb and ADls, highly active, recent travel to [place] to [place] and +skiing, +driving and travels for work. Leisurely goes to yoga 3x/wk.

## 2018-03-10 NOTE — PHYSICAL THERAPY INITIAL EVALUATION ADULT - DISCHARGE DISPOSITION, PT EVAL
DC working towards home with home PT vs outpt PT services, Dc plan to be emailed, CM to be notified.

## 2018-03-10 NOTE — CHART NOTE - NSCHARTNOTEFT_GEN_A_CORE
1145Pm  Received a  called  from  radiology  stating  that  pt  has questionable  findings. on : MRI  Lumbar Spine No Cont (03.09.18 @ 22:26) >  which  showed T3-4 there a disc deformity and ventral cord displacement, a ventral cord herniation is not excluded, axial T2 series 7 image 4, sagittal   series 3 image 8. Multilevel degenerative changes  in the cervical and lumbar spine as detailed above without  severe canal compromise or cord compression.  Questionable areas of slight high T2 signal in the cervical cord may reflect motion, suggest repeat imaging when patient can hold still with dimensions region.    < end of copied text >                 Pt  is a 62y old  Female who presented with a chief complaint of numbness paresthesia of hands and feet x 3 weeks, preceded by URI, no neck/back pain, she also reports ataxia of 3 days duration. Numbness has started to affect her face as well.       Vital  signs  T(C): 36.8 (10 Mar 2018 00:00), Max: 37.2 (09 Mar 2018 14:32)  T(F): 98.3 (10 Mar 2018 00:00), Max: 99 (09 Mar 2018 14:32)  HR: 69 (10 Mar 2018 00:00) (67 - 75)  BP: 90/54 (10 Mar 2018 00:00) (79/43 - 130/87)  BP(mean): --  RR: 16 (10 Mar 2018 00:00) (16 - 18)  SpO2: 93% (10 Mar 2018 00:00) (93% - 96%)      Labs:                          11.5   4.19  )-----------( 218      ( 09 Mar 2018 08:07 )             37.1     03-09    140  |  105  |  14  ----------------------------<  91  4.0   |  24  |  0.95    Ca    8.8      09 Mar 2018 06:57  Phos  3.1     03-08  Mg     2.2     03-08    TPro  6.2  /  Alb  3.5  /  TBili  0.6  /  DBili  x   /  AST  35  /  ALT  40  /  AlkPhos  69  03-09        Radiology:    Physical Exam:  General: WN/WD NAD  Neurology: A&Ox3, nonfocal, LEUNG x 4  Head:  Normocephalic, atraumatic  Respiratory: CTA B/L  CV: RRR, S1S2, no murmur  Abdominal: Soft, NT, ND no palpable mass  MSK: No edema, + peripheral pulses, FROM all 4 extremity, weakness  noted on left  upper  extremity/    Assessment & Plan:  HPI:  63 y/o f w/ PMH of meningioma s/p craniotomy, HLD, depression p/w symptoms of numbness and tingling in hands and feet for the last couple of weeks, complicated by weakness, unsteadiness, and difficulty walking.  Pt initially had upper respiratory symptoms while in [place], and was diagnosed at a walk-in-clinic with a walking pneumonia, for which she was started on doxycycline.  Pt states that the symptoms of numbness and tingling started shortly after then.  They were initially localized ot her hands and feet, but eventually included her abdomen.  She did not have any n/v, diarrhea.  She noticed in the last few days she was also weaker, and had more difficulty with ambulation, and tingling and numbness had gotten worse.  She went ot he her neurosurgeon, who found on reflex exam, that reflexes that are usually brisk for her were diminished.  She had an outpt MRI of cervical spine that showed multiple areas of stenoses.  Now  with  ?Cord     S/P  MRI -  results  noted above  > Neurology  called and  Discussed with  neurology  NP [id number]    >ORtho  surgery  called  (  Covering   neurosurgery) awaits  Eval   > F/U  Neuro  checks   >250 NS  bolus  given  to  pt  at  this time  due  to  hypotensive.        Follow up with Attending in AM.

## 2018-03-10 NOTE — PROGRESS NOTE ADULT - SUBJECTIVE AND OBJECTIVE BOX
NEUROLOGY FOLLOW UP NOTE     Patient is a 62y old  Female who presents with a chief complaint of numbness (08 Mar 2018 18:09)      SUBJECTIVE, INTERVAL HISTORY: Overnight, MRI showing multilevel disc disease, as well as possible hyperintensity in cervical region and T3-T4 ventral cord displacement. Patient without any change in symptoms.     OBJECTIVE:   Vital Signs Last 24 Hrs  T(C): 36.8 (10 Mar 2018 07:59), Max: 37.2 (09 Mar 2018 14:32)  T(F): 98.3 (10 Mar 2018 07:59), Max: 99 (09 Mar 2018 14:32)  HR: 80 (10 Mar 2018 07:59) (60 - 84)  BP: 98/70 (10 Mar 2018 07:59) (79/43 - 125/81)  BP(mean): --  RR: 16 (10 Mar 2018 07:59) (16 - 18)  SpO2: 96% (10 Mar 2018 07:59) (93% - 99%)    03-10    138  |  104  |  15  ----------------------------<  95  4.0   |  23  |  0.82    Ca    9.3      10 Mar 2018 07:05  Phos  3.1     03-08  Mg     2.2     03-08    TPro  6.2  /  Alb  3.5  /  TBili  0.6  /  DBili  x   /  AST  35  /  ALT  40  /  AlkPhos  69  03-09                          11.9   2.92  )-----------( 188      ( 10 Mar 2018 08:31 )             35.6       MEDICATIONS  (STANDING):  acetaminophen   Tablet 325 milliGRAM(s) Oral daily  buPROPion  milliGRAM(s) Oral daily  cholecalciferol 2000 Unit(s) Oral every 24 hours  citalopram 10 milliGRAM(s) Oral daily  dexamethasone  IVPB 10 milliGRAM(s) IV Intermittent every 8 hours  diphenhydrAMINE   Injectable 25 milliGRAM(s) IV Push daily  heparin  Injectable 5000 Unit(s) SubCutaneous every 8 hours  immune globulin gamma IVPB 25 Gram(s) IV Intermittent daily  Ospemifene (Osphena) 60 milliGRAM(s) 60 milliGRAM(s) Oral daily  pregabalin 25 milliGRAM(s) Oral every 24 hours  simvastatin 10 milliGRAM(s) Oral at bedtime      GENERAL EXAM: No acute distress                   NEUROLOGICAL EXAM:  Mental Status: AAOx3, fluent speech, follows simple commands, able to name  Cranial Nerves: EOMI, PERRL, VFF, V1-V3 intact, facial symmetric, tongue midline  Motor: 5/5 b/l deltoid/tricep/bicep/handgrip/wrist extension and flexion, 4+/5 left dorsiflexion, rest 5/5 b/l LEs, however, on functional testing patient unable to squat   Sensation: decreased proprioception b/l toes, sensory level in abdominal region only in area of T6-T10 approx to PP   Coordination: FTN intact b/l  Reflexes: 1+ R brachioradialis, rest arreflexic     RADIOLOGY:    < from: MR Lumbar Spine No Cont (03.09.18 @ 22:26) >  At T3-4 there a disc deformity and ventral cord displacement, a ventral   cord herniation is not excluded, axial T2 series 7 image 4, sagittal   series 3 image 8.    Multilevel degenerative changes  in the cervical and lumbar spine as   detailed above without  severe canal compromise or cord compression.    Questionable areas of slight high T2 signal in the cervical cord may   reflect motion, suggest repeat imaging when patient can hold still with   dimensions region.    < end of copied text > NEUROLOGY FOLLOW UP NOTE     Patient is a 62y old  Female who presents with a chief complaint of numbness (08 Mar 2018 18:09)      SUBJECTIVE, INTERVAL HISTORY: Overnight, MRI showing multilevel disc disease, as well as possible hyperintensity in cervical region and T3-T4 ventral cord displacement. Patient without any change in symptoms.  no SOB, still unsteady, feeling weak in legs, and numb in hands and legs    OBJECTIVE:   Vital Signs Last 24 Hrs  T(C): 36.8 (10 Mar 2018 07:59), Max: 37.2 (09 Mar 2018 14:32)  T(F): 98.3 (10 Mar 2018 07:59), Max: 99 (09 Mar 2018 14:32)  HR: 80 (10 Mar 2018 07:59) (60 - 84)  BP: 98/70 (10 Mar 2018 07:59) (79/43 - 125/81)  BP(mean): --  RR: 16 (10 Mar 2018 07:59) (16 - 18)  SpO2: 96% (10 Mar 2018 07:59) (93% - 99%)    03-10    138  |  104  |  15  ----------------------------<  95  4.0   |  23  |  0.82    Ca    9.3      10 Mar 2018 07:05  Phos  3.1     03-08  Mg     2.2     03-08    TPro  6.2  /  Alb  3.5  /  TBili  0.6  /  DBili  x   /  AST  35  /  ALT  40  /  AlkPhos  69  03-09                          11.9   2.92  )-----------( 188      ( 10 Mar 2018 08:31 )             35.6       MEDICATIONS  (STANDING):  acetaminophen   Tablet 325 milliGRAM(s) Oral daily  buPROPion  milliGRAM(s) Oral daily  cholecalciferol 2000 Unit(s) Oral every 24 hours  citalopram 10 milliGRAM(s) Oral daily  dexamethasone  IVPB 10 milliGRAM(s) IV Intermittent every 8 hours  diphenhydrAMINE   Injectable 25 milliGRAM(s) IV Push daily  heparin  Injectable 5000 Unit(s) SubCutaneous every 8 hours  immune globulin gamma IVPB 25 Gram(s) IV Intermittent daily  Ospemifene (Osphena) 60 milliGRAM(s) 60 milliGRAM(s) Oral daily  pregabalin 25 milliGRAM(s) Oral every 24 hours  simvastatin 10 milliGRAM(s) Oral at bedtime      GENERAL EXAM: No acute distress      breathing appears completely comfortable, normal rate, no dyspnea           NEUROLOGICAL EXAM:  Mental Status: AAOx3, fluent speech, follows simple commands, able to name, coherent, alert  Cranial Nerves: EOMI, PERRL, VFF, V1-V3 intact, facial symmetric, tongue midline, no dysarthria  Motor: 5/5 b/l deltoid/tricep/bicep/handgrip/wrist extension and 4/5 flexion/FF,  4+/5 left dorsiflexion, HF, Ham, rest 5/5,   Sensation: decreased proprioception b/l toes, sensory level in abdominal region only in area of T5-T10 approx to PP/LT   Coordination: FTN intact b/l, unsteady gait, on functional testing patient unable to squat, unable to tandem. +rhomberg  Reflexes: 1+ R/L brachioradialis, rest arreflexic, plantars mute     RADIOLOGY:    < from: MR Lumbar Spine No Cont (03.09.18 @ 22:26) >  At T3-4 there a disc deformity and ventral cord displacement, a ventral   cord herniation is not excluded, axial T2 series 7 image 4, sagittal   series 3 image 8.    Multilevel degenerative changes  in the cervical and lumbar spine as   detailed above without  severe canal compromise or cord compression.    Questionable areas of slight high T2 signal in the cervical cord may   reflect motion, suggest repeat imaging when patient can hold still with   dimensions region.    < end of copied text >

## 2018-03-10 NOTE — PROGRESS NOTE ADULT - PROBLEM SELECTOR PLAN 1
DDX includes gbs vs aidp vs cervical/thoracic myelopathy  Pt s/p LP, results noted  Pt on empiric course of IVIG    NIF and VC   Hypotension yesterday likely in the setting of IVIG/benadyly prep, will continue IVF for 12hours and reassess in AM, continue to monitor BP  Increase lyrica to 25mg bid for symptomatic control   PT eval recommending home PT  OT/SS eval pending   Neuro recs appreciated

## 2018-03-10 NOTE — PHYSICAL THERAPY INITIAL EVALUATION ADULT - BALANCE TRAINING, PT EVAL
Pt will be able to tolerate dynamic standing balance during functional ADL tasks/ambulation in 2 wks.

## 2018-03-10 NOTE — CONSULT NOTE ADULT - SUBJECTIVE AND OBJECTIVE BOX
Patient is a 62y Female who seen at the request of neurology for multilevel cord compression seen in MRI. Per patient she began experiencing B/L fingers/toes three weeks ago which progressed to numbness in B/L hands/feet. Then three days ago she began experiencing gait ataxia. Was referred by outpatient neurologist for hospitalization. Prior to onset patient went on Ski trip, where she fell numerous times. Denies pain/injury. Denies bowel/bladder incontinence. Denies fevers/chills. No other complaints at this time.    HEALTH ISSUES - PROBLEM Dx:  Thyroid nodule: Thyroid nodule  Need for prophylactic measure: Need for prophylactic measure  Dyspareunia in female: Dyspareunia in female  Hyperlipidemia: Hyperlipidemia  Depression: Depression  Meningioma: Meningioma  Numbness and tingling: Numbness and tingling          MEDICATIONS  (STANDING):  acetaminophen   Tablet 325 milliGRAM(s) Oral daily  buPROPion  milliGRAM(s) Oral daily  cholecalciferol 2000 Unit(s) Oral every 24 hours  citalopram 10 milliGRAM(s) Oral daily  diphenhydrAMINE   Injectable 25 milliGRAM(s) IV Push daily  heparin  Injectable 5000 Unit(s) SubCutaneous every 8 hours  immune globulin gamma IVPB 25 Gram(s) IV Intermittent daily  Ospemifene (Osphena) 60 milliGRAM(s) 60 milliGRAM(s) Oral daily  pregabalin 25 milliGRAM(s) Oral every 24 hours  simvastatin 10 milliGRAM(s) Oral at bedtime      Allergies    No Known Allergies    Intolerances        PAST MEDICAL & SURGICAL HISTORY:  Depression  Hyperlipidemia  Meningioma  History of craniotomy: for meningioma                            11.5   4.19  )-----------( 218      ( 09 Mar 2018 08:07 )             37.1       09 Mar 2018 06:57    140    |  105    |  14     ----------------------------<  91     4.0     |  24     |  0.95     Ca    8.8        09 Mar 2018 06:57  Phos  3.1       08 Mar 2018 15:02  Mg     2.2       08 Mar 2018 15:02    TPro  6.2    /  Alb  3.5    /  TBili  0.6    /  DBili  x      /  AST  35     /  ALT  40     /  AlkPhos  69     09 Mar 2018 06:57      PT/INR - ( 08 Mar 2018 15:02 )   PT: 10.0 sec;   INR: 0.92 ratio         PTT - ( 08 Mar 2018 15:02 )  PTT:30.1 sec        Vital Signs Last 24 Hrs  T(C): 36.8 (03-10-18 @ 00:30), Max: 37.2 (03-09-18 @ 14:32)  T(F): 98.3 (03-10-18 @ 00:30), Max: 99 (03-09-18 @ 14:32)  HR: 84 (03-10-18 @ 01:00) (67 - 84)  BP: 120/80 (03-10-18 @ 01:00) (79/43 - 130/87)  BP(mean): --  RR: 18 (03-10-18 @ 01:00) (16 - 18)  SpO2: 99% (03-10-18 @ 01:00) (93% - 99%)    Gen: NAD    Spine PE:  Skin intact  No gross deformity  No midnline TTP C/T/L/S spine  No bony step offs  No paraspinal muscle ttp/hypertonicity   Negative clonus  Negative babinski  Negative wynn  No saddle anesthesia  Uncoordinated movements with rapid alternating hand movements    Motor:                       C5                C6              C7               C8           T1   R            5/5                5/5            4/5             5/5          5/5  L             5/5               4/5             4/5             5/5          5/5                  L2             L3             L4               L5            S1  R         4/5           4/5          4/5             5/5           5/5  L          4/5          4/5           4/5             5/5           5/5    Sensory:  C5-T1, L2-S1 SILT B/L, dec sens B/L hands/feet    FINDINGS:    Reversed cervical lordosis with 3 mm anterior displacement of C3 on C4   and 4 mm posterior displacement of C4 on C5, C5 and C6, and 3 mm anterior   displacement of C7 on T1. There is a S-shaped rotary dextroscoliosis of   the lower cervical spine,   levoscoliosis of the upper thoracic spine and   dextro scoliosis of the mid to lower thoracic spine, and levoscoliosis of   the lower lumbar sacral spine..    Structures at the craniocervical and cervicomedullary junction are   intact. Vertebrae demonstrate unremarkable height loss loss of T1 marrow   signal which may reflect a marrow replacement process without fracture,   dislocation or marrow edema.    Current study is limited by motion this may be causing artificial   hyperintense T2 signal within the cord at the C5-C7 levels suggest repeat   imaging without motion and attention to this region.    The course and caliber of the cervical cord is unremarkable, no contrast   was administered, patient cannot continue, suggest repeat patient hold   still and received gadolinium as clinically warranted.    There is a heterogeneous thyroid that could be better characterized with   thyroid ultrasound as warranted.    There is degenerative change with loss of disc height throughout the   cervical spine, findings at specific levels are as follows:    At C2-3 there is no canal or foraminal narrowing.    At C3-4 disc osteophyte ridge, right greater than left uncovertebral   hypertrophy and facet arthropathy cause mild right greater than left   foraminal narrowing without canal or right prominent.    At C4-5 a disc osteophyte ridge with a central and left paramedian disc   deformity and right annular tear and herniation herniation flattening the   cord, canal AP diameter is narrowed to 7 mm with moderate bilateral   foraminal narrowing.    At C5-6- a disc osteophyte ridge with central midline deformity and   possible herniation flattens the cord, canal AP diameter measures 6.4 mm,   with mild to moderate inferior foraminal narrowing.    At C6-7 disc osteophyte ridge with a central right paracentral disc   herniation flattens  cord, canal AP diameter measures 6.3 mm, there is   far left lateral extension the disc causing mild inferior left foraminal   narrowing without evidence for any significant narrowing.    At C7-T1 a far left lateral disc deformity causes mild to moderate   inferior left foraminal narrowing without central canal or right   prominent.    Thoracic Spine:    There is unremarkable alignment of the T1-T12 vertebral bodies with   preservation vertebral height and slight heterogeneity in the marrow with   foci of hyperintense T2 and T2 signal likely represent incidental   hemangiomas. There is multilevel degenerative change with Schmorl's nodes   throughout the thoracic spine along the supra and inferior endplates.    There is no evidence for any severe cord compression or canal compromise.    At the T3-4 level, there appears to be a left paramedian disc deformity   and irregular anterior displacement of the cord, this is also seen in   retrospect on the previous outside study, findings are suggestive of   possible ventral cord herniation, axial T2 series 7 image 4.    There is no severe canal or foraminal narrowing within the thoracic spine,      Lumbar spine:    There is degenerative change with loss of disc height and signal from   L1-S1, bilateral facet hypertrophy, facet joint effusions, and ligamentum   flavum hypertrophy. Vertebral bodies are overall unremarkable with   multiple hemangiomas imaging hyperintense T2 and T2 signal throughout the   vertebra with possible small sacral insufficiency fractures.    The conus medullaris is at L1. Both the conus medullaris and cauda equina   are unremarkable in signal intensity and morphology.    There is a right renal cyst that could be better clarified with   ultrasound.    Findings at specific levels are as follows:    At T12-L1 there is no canal or foraminal narrowing.    At L1-L2 there is a disc osteophyte ridge with lateral extension without   canal or foraminal narrowing.    At L2-3 there is a broad-based disc bulge with lateral extension without   canal or right foraminal narrowing and mild far left inferior foraminal   narrowing.    At L3-4 there is a disc osteophyte ridge with far left lateral extension   exaggerated by the scoliotic curvature with moderate inferior left   foraminal narrowing, the disc abuts the exiting left-sided nerve root   without canal or right foraminal narrowing.    At L4-5 there is a broad-based disc bulge with a right paramedian disc   herniation, causing moderate inferior right foraminal narrowing and mild   inferior left foraminal narrowing, canal AP diameter is narrowed to 9.1   mm, there is mild right lateral recess narrowing, with the disc abutting   the descending right L5 nerve root.    At L5-S1 there is a midline disc deformity without canal or foraminal   narrowing.    There is incidental epidural lipomatosis in the spinal canal.    IMPRESSION:    At T3-4 there a disc deformity and ventral cord displacement, a ventral   cord herniation is not excluded, axial T2 series 7 image 4, sagittal   series 3 image 8.    Multilevel degenerative changes  in the cervical and lumbar spine as   detailed above without  severe canal compromise or cord compression.    Questionable areas of slight high T2 signal in the cervical cord may   reflect motion, suggest repeat imaging when patient can hold still with   dimensions region.      A/P: 62y Female with cervical myelopathy   Case discussed with Dr. Wolff  Multilevel cervical stenosis, lumbar foraminal stenosis seen on MRI- nothing seen on MRI that warrants emergent surgical intervention  10mg IV Decadron x3 doses  Pain control  WBAT with assistive devices as needed  FU Labs/imaging  SCDs  Will monitor for signs of improvement  Will continue to follow

## 2018-03-10 NOTE — PROGRESS NOTE ADULT - SUBJECTIVE AND OBJECTIVE BOX
Patient is a 62y old  Female who presents with a chief complaint of numbness (08 Mar 2018 18:09)      SUBJECTIVE / OVERNIGHT EVENTS: Feels better, still with numbness and tingling in extremities, no cp, sob, no bowel or urinary incontinence     MEDICATIONS  (STANDING):  acetaminophen   Tablet 325 milliGRAM(s) Oral daily  buPROPion  milliGRAM(s) Oral daily  cholecalciferol 2000 Unit(s) Oral every 24 hours  citalopram 10 milliGRAM(s) Oral daily  dexamethasone  IVPB 10 milliGRAM(s) IV Intermittent every 8 hours  diphenhydrAMINE   Injectable 25 milliGRAM(s) IV Push daily  heparin  Injectable 5000 Unit(s) SubCutaneous every 8 hours  immune globulin gamma IVPB 25 Gram(s) IV Intermittent daily  Ospemifene (Osphena) 60 milliGRAM(s) 60 milliGRAM(s) Oral daily  pregabalin 25 milliGRAM(s) Oral two times a day  simvastatin 10 milliGRAM(s) Oral at bedtime  sodium chloride 0.9%. 1000 milliLiter(s) (65 mL/Hr) IV Continuous <Continuous>    MEDICATIONS  (PRN):  acetaminophen   Tablet 650 milliGRAM(s) Oral every 6 hours PRN For Temp greater than 38 C (100.4 F)  acetaminophen   Tablet. 650 milliGRAM(s) Oral every 6 hours PRN Mild Pain (1 - 3)        CAPILLARY BLOOD GLUCOSE        I&O's Summary    09 Mar 2018 07:01  -  10 Mar 2018 07:00  --------------------------------------------------------  IN: 1390 mL / OUT: 0 mL / NET: 1390 mL    10 Mar 2018 06:01  -  10 Mar 2018 18:45  --------------------------------------------------------  IN: 615 mL / OUT: 0 mL / NET: 615 mL        PHYSICAL EXAM:  GENERAL: NAD, well-developed  HEAD:  Atraumatic, Normocephalic  EYES: EOMI, PERRLA, conjunctiva and sclera clear  NECK: Supple, No JVD  CHEST/LUNG: Clear to auscultation bilaterally; No wheeze  HEART: Regular rate and rhythm; S1S2  ABDOMEN: Soft, Nontender, Nondistended; Bowel sounds present  EXTREMITIES:  2+ Peripheral Pulses, No clubbing, cyanosis, or edema  PSYCH: AAOx3  NEUROLOGY: 5/5 strength b/l, sensation intact, cn grossly intact   SKIN: No rashes or lesions    LABS:                        11.9   2.92  )-----------( 188      ( 10 Mar 2018 08:31 )             35.6     03-10    138  |  104  |  15  ----------------------------<  95  4.0   |  23  |  0.82    Ca    9.3      10 Mar 2018 07:05    TPro  6.2  /  Alb  3.5  /  TBili  0.6  /  DBili  x   /  AST  35  /  ALT  40  /  AlkPhos  69  03-09              RADIOLOGY & ADDITIONAL TESTS:    Imaging Personally Reviewed:    Consultant(s) Notes Reviewed:  Neuro, optho    Care Discussed with Consultants/Other Providers: neuro

## 2018-03-10 NOTE — PROGRESS NOTE ADULT - ASSESSMENT
62 year old F presenting with 3 week history of b/l hand and feet tingling, unsteady gait and feeling of oppression in abdominal region. PE remarkable for arreflexia, T6-T10 sensory level, and hip flexion weak on functional testing. Imaging remarkable for T3-T4 ventral cord displacement and possible increased T2 signal on cervical cord. Further workup remarkable for CSF protein 64. At this time, sandra 62 year old F presenting with 3 week history of b/l hand and feet tingling, unsteady gait and feeling of oppression in abdominal region. PE remarkable for arreflexia, T6-T10 sensory level, and hip flexion weak on functional testing. Imaging remarkable for T3-T4 ventral cord displacement and possible increased T2 signal on cervical cord. Further workup remarkable for CSF protein 64. At this time, differential includes AIDP vs partial cord compression.     Plan:   - c/w IVIG for possible AIDP at current dose given unclear diagnosis and precending URI illness w/ arreflexia and elevated protein   - Orthopedics following for possible cord compression: recommend Decadron 10 mg x3 doses and no need for acute surgical intervention   - Physical and Occupational Therapy 62 year old F presenting with 3 week history of b/l hand and feet tingling, unsteady gait and feeling of oppression in abdominal region. PE remarkable for arreflexia, T6-T10 sensory level, and hip flexion weak on functional testing. Imaging remarkable for T3-T4 ventral cord displacement and possible increased T2 signal on cervical cord. Further workup remarkable for CSF protein 64. At this time, differential includes AIDP vs partial cord compression.     Plan:   - c/w IVIG for possible AIDP at current dose given unclear diagnosis and precending URI illness w/ arreflexia and elevated protein   - Orthopedics following for possible cord compression: recommend Decadron 10 mg x3 doses and no need for acute surgical intervention   - Physical and Occupational Therapy   - no airway compromise, will decrease NIF and VC to qd 62 year old F presenting with 3 week history of b/l hand and feet tingling, unsteady gait and feeling of oppression in abdominal region. PE remarkable for arreflexia, T6-T10  band like sensory level, symmetrical leg and hand sensory loss (JPS, LT > PP) and mild wrist flexion weakness, and L>R hip flexion , ham, tibialis anterior weak on exam.     Imaging remarkable for T3-T4 ventral cord displacement and possible increased T2 signal on cervical cord and degenerative spinal canal stenosis. Further workup remarkable for CSF protein 64.     At this time, differential dx includes cervical/thoracic myelopathy, ie acute on chronic.  Spine eval appreciated to r/o acute surgical lesion.  Given decadron in interim.  F/u with spine surgery recs    However, on exam today, pt is diffusely hyporeflexic with relatively symmetric loss of sensation to feet to ankles and now hands, per hx developing over last 3 weeks.  No B/B dysfunction as yet, or respiratory compromise.  Unclear given history of viral prodrome prior to onset of symptoms if AIDP is also a possibility.  Agree with empiric course of IVIG, with low risk of downside.  Rec EMG in very near future (if possible) to elucidate concerns for LMN dysfunction/GBS.  - c/w IVIG for possible AIDP at current dose given unclear diagnosis and precending URI illness w/ arreflexia and elevated protein   - no airway compromise, will decrease NIF and VC to qd   - Physical and Occupational Therapy

## 2018-03-10 NOTE — PHYSICAL THERAPY INITIAL EVALUATION ADULT - PLANNED THERAPY INTERVENTIONS, PT EVAL
balance training/stair neg; Pt will be able to negotiate 12 steps independently in 2 wks./gait training

## 2018-03-10 NOTE — PROGRESS NOTE ADULT - ATTENDING COMMENTS
Attending Attestation:   note above with my modifications, assuming care for patient as of today 3/10/18 .     I was physically present for the key portions of the evaluation and management (E/M) service provided.  I agree with the above history, physical, and plan which I have reviewed and edited where appropriate.     45 minutes spent on total encounter; more than 50% of the visit was spent counseling and/or coordinating care by the attending physician.

## 2018-03-10 NOTE — PHYSICAL THERAPY INITIAL EVALUATION ADULT - PRECAUTIONS/LIMITATIONS, REHAB EVAL
Imaging remarkable for T3-T4 ventral cord displacement and possible increased T2 signal on cervical cord and degenerative spinal canal stenosis. Further workup remarkable for CSF protein 64. Per neuro, At this time, differential dx includes cervical/thoracic myelopathy, ie acute on chronic.  Spine eval appreciated to r/o acute surgical lesion.  Given decadron in interim. F/u with spine surgery recs. Overnight, MRI showing multilevel disc disease, as well as possible hyperintensity in cervical region and T3-T4 ventral cord displacement. Patient without any change in symptoms.  no SOB, still unsteady, feeling weak in legs, and numb in hands and legs. pt is diffusely hyporeflexic with relatively symmetric loss of sensation to feet to ankles and now hands, per hx developing over last 3 weeks.  No B/B dysfunction as yet, or respiratory compromise. Unclear given history of viral prodrome prior to onset of symptoms if AIDP is also a possibility. Per ortho, Multilevel cervical stenosis, lumbar foraminal stenosis seen on MRI- nothing seen on MRI that warrants emergent surgical intervention, WBAT./fall precautions

## 2018-03-10 NOTE — PHYSICAL THERAPY INITIAL EVALUATION ADULT - ACTIVE RANGE OF MOTION EXAMINATION, REHAB EVAL
Right UE Active ROM was WFL (within functional limits)/Left UE Active ROM was WFL (within functional limits)/Left LE Active ROM was WFL (within functional limits)/Right LE Active ROM was WFL (within functional limits)

## 2018-03-10 NOTE — PHYSICAL THERAPY INITIAL EVALUATION ADULT - PERTINENT HX OF CURRENT PROBLEM, REHAB EVAL
Pt is a 62 year old F admitted on 3/8/18 presenting with 3 week history of b/l hand and feet tingling, unsteady gait and feeling of oppression in abdominal region. PE remarkable for arreflexia, T6-T10  band like sensory level, symmetrical leg and hand sensory loss (JPS, LT > PP) and mild wrist flexion weakness, and L>R hip flexion , ham, tibialis anterior weak on exam.

## 2018-03-11 ENCOUNTER — TRANSCRIPTION ENCOUNTER (OUTPATIENT)
Age: 63
End: 2018-03-11

## 2018-03-11 LAB
ANION GAP SERPL CALC-SCNC: 14 MMOL/L — SIGNIFICANT CHANGE UP (ref 5–17)
BASOPHILS # BLD AUTO: 0 K/UL — SIGNIFICANT CHANGE UP (ref 0–0.2)
BASOPHILS NFR BLD AUTO: 0 % — SIGNIFICANT CHANGE UP (ref 0–2)
BUN SERPL-MCNC: 20 MG/DL — SIGNIFICANT CHANGE UP (ref 7–23)
CALCIUM SERPL-MCNC: 9 MG/DL — SIGNIFICANT CHANGE UP (ref 8.4–10.5)
CHLORIDE SERPL-SCNC: 105 MMOL/L — SIGNIFICANT CHANGE UP (ref 96–108)
CO2 SERPL-SCNC: 18 MMOL/L — LOW (ref 22–31)
CREAT SERPL-MCNC: 0.72 MG/DL — SIGNIFICANT CHANGE UP (ref 0.5–1.3)
EOSINOPHIL # BLD AUTO: 0.01 K/UL — SIGNIFICANT CHANGE UP (ref 0–0.5)
EOSINOPHIL NFR BLD AUTO: 0.2 % — SIGNIFICANT CHANGE UP (ref 0–6)
GLUCOSE SERPL-MCNC: 136 MG/DL — HIGH (ref 70–99)
HCT VFR BLD CALC: 35.7 % — SIGNIFICANT CHANGE UP (ref 34.5–45)
HGB BLD-MCNC: 11.7 G/DL — SIGNIFICANT CHANGE UP (ref 11.5–15.5)
IMM GRANULOCYTES NFR BLD AUTO: 0 % — SIGNIFICANT CHANGE UP (ref 0–1.5)
LYMPHOCYTES # BLD AUTO: 0.88 K/UL — LOW (ref 1–3.3)
LYMPHOCYTES # BLD AUTO: 17.1 % — SIGNIFICANT CHANGE UP (ref 13–44)
MAGNESIUM SERPL-MCNC: 2 MG/DL — SIGNIFICANT CHANGE UP (ref 1.6–2.6)
MCHC RBC-ENTMCNC: 31.1 PG — SIGNIFICANT CHANGE UP (ref 27–34)
MCHC RBC-ENTMCNC: 32.8 GM/DL — SIGNIFICANT CHANGE UP (ref 32–36)
MCV RBC AUTO: 94.9 FL — SIGNIFICANT CHANGE UP (ref 80–100)
MONOCYTES # BLD AUTO: 0.42 K/UL — SIGNIFICANT CHANGE UP (ref 0–0.9)
MONOCYTES NFR BLD AUTO: 8.1 % — SIGNIFICANT CHANGE UP (ref 2–14)
NEUTROPHILS # BLD AUTO: 3.85 K/UL — SIGNIFICANT CHANGE UP (ref 1.8–7.4)
NEUTROPHILS NFR BLD AUTO: 74.6 % — SIGNIFICANT CHANGE UP (ref 43–77)
PHOSPHATE SERPL-MCNC: 3.3 MG/DL — SIGNIFICANT CHANGE UP (ref 2.5–4.5)
PLATELET # BLD AUTO: 234 K/UL — SIGNIFICANT CHANGE UP (ref 150–400)
POTASSIUM SERPL-MCNC: 4.2 MMOL/L — SIGNIFICANT CHANGE UP (ref 3.5–5.3)
POTASSIUM SERPL-SCNC: 4.2 MMOL/L — SIGNIFICANT CHANGE UP (ref 3.5–5.3)
RBC # BLD: 3.76 M/UL — LOW (ref 3.8–5.2)
RBC # FLD: 13.4 % — SIGNIFICANT CHANGE UP (ref 10.3–14.5)
SODIUM SERPL-SCNC: 137 MMOL/L — SIGNIFICANT CHANGE UP (ref 135–145)
WBC # BLD: 5.16 K/UL — SIGNIFICANT CHANGE UP (ref 3.8–10.5)
WBC # FLD AUTO: 5.16 K/UL — SIGNIFICANT CHANGE UP (ref 3.8–10.5)

## 2018-03-11 PROCEDURE — 99233 SBSQ HOSP IP/OBS HIGH 50: CPT

## 2018-03-11 PROCEDURE — 99232 SBSQ HOSP IP/OBS MODERATE 35: CPT

## 2018-03-11 RX ORDER — SODIUM CHLORIDE 9 MG/ML
1000 INJECTION INTRAMUSCULAR; INTRAVENOUS; SUBCUTANEOUS
Qty: 0 | Refills: 0 | Status: DISCONTINUED | OUTPATIENT
Start: 2018-03-11 | End: 2018-03-11

## 2018-03-11 RX ORDER — SODIUM CHLORIDE 9 MG/ML
1000 INJECTION INTRAMUSCULAR; INTRAVENOUS; SUBCUTANEOUS
Qty: 0 | Refills: 0 | Status: DISCONTINUED | OUTPATIENT
Start: 2018-03-11 | End: 2018-03-14

## 2018-03-11 RX ADMIN — Medication 50 MILLIGRAM(S): at 21:52

## 2018-03-11 RX ADMIN — Medication 25 MILLIGRAM(S): at 13:04

## 2018-03-11 RX ADMIN — Medication 25 MILLIGRAM(S): at 05:16

## 2018-03-11 RX ADMIN — Medication 325 MILLIGRAM(S): at 21:52

## 2018-03-11 RX ADMIN — Medication 2000 UNIT(S): at 13:04

## 2018-03-11 RX ADMIN — BUPROPION HYDROCHLORIDE 150 MILLIGRAM(S): 150 TABLET, EXTENDED RELEASE ORAL at 13:04

## 2018-03-11 RX ADMIN — Medication 50 GRAM(S): at 22:55

## 2018-03-11 RX ADMIN — SODIUM CHLORIDE 65 MILLILITER(S): 9 INJECTION INTRAMUSCULAR; INTRAVENOUS; SUBCUTANEOUS at 22:56

## 2018-03-11 RX ADMIN — Medication 25 MILLIGRAM(S): at 21:52

## 2018-03-11 RX ADMIN — SIMVASTATIN 10 MILLIGRAM(S): 20 TABLET, FILM COATED ORAL at 21:52

## 2018-03-11 RX ADMIN — CITALOPRAM 10 MILLIGRAM(S): 10 TABLET, FILM COATED ORAL at 13:04

## 2018-03-11 NOTE — DISCHARGE NOTE ADULT - PATIENT PORTAL LINK FT
You can access the [facility] Patient Portal, offered by [facility], by registering with the following website: [email or web address]

## 2018-03-11 NOTE — PROGRESS NOTE ADULT - ASSESSMENT
61 y/o woman w/ PMH of meningioma s/p craniotomy (1993), HLD, depression p/w symptoms of progressive numbness and tingling in extremities, weakness, and difficulty ambulating over the past few weeks, admitted with concern for possible GBS.

## 2018-03-11 NOTE — DISCHARGE NOTE ADULT - HOSPITAL COURSE
HPI "61 y/o f w/ PMH of meningioma s/p craniotomy, HLD, depression p/w symptoms of numbness and tingling in hands and feet for the last couple of weeks, complicated by weakness, unsteadiness, and difficulty walking.  Pt initially had upper respiratory symptoms while in [place], and was diagnosed at a walk-in-clinic with a walking pneumonia, for which she was started on doxycycline.  Pt states that the symptoms of numbness and tingling started shortly after then.  They were initially localized ot her hands and feet, but eventually included her abdomen.  She did not have any n/v, diarrhea.  She noticed in the last few days she was also weaker, and had more difficulty with ambulation, and tingling and numbness had gotten worse.  She went ot he her neurosurgeon, who found on reflex exam, that reflexes that are usually brisk for her were diminished.  She had an outpt MRI of cervical spine that showed multiple areas of stenoses.  Dr. Weston was concerned about the possibility of GBS, and sent her to the ED for further work up.    In the ED, pt had a CT head, and was seen by neurology, who recommended MRI of head and T + L spine."    GIven concern fr hyporeflexia, concern For AIDP. Patient was txed to Neurology for further management. LP performed demonstrated albuminocytologic dissociation. Patient started on 5 days of IVIG. MRI B Remote right parietal craniotomy. MRi spine multiple disc osteophytes, Questionable ventral disc herniation T3-T4__Ortho_________________PT evaluated Pt_______________ 61 yo woman who presents to [facility] on referral by her private neurologist, Dr. Barrios, w/ 1 day onset of gait instability w/ b/l acute lower extremity hyporeflexia,      This was preceeded by an URI (2/12/18) while traveling in [facility], treated w/ doxycyline, followed by b/l peripheral neuropathy (tingling) in finger and toes (2/16/18), followed by numbness in the abdominal region (3/5/18). Patient's symptoms refractory to meloxicam and lyrica, which was started 2 days ago. Pertinent hx includes meningioma s/p craniotomy (1993) w/ regular MRI every other year, cervical stenosis (seen on MRI c-spine). Patient noted that while on her trip in [facility], she did go skiing w/ multiple subsequent falls however does not recall any particular falls resulting in any significant trauma..     63 y/o f w/ PMH of meningioma s/p craniotomy, HLD, depression p/w symptoms of numbness and tingling in hands and feet for the last couple of weeks, complicated by weakness, unsteadiness, and difficulty walking.  Pt initially had upper respiratory symptoms while in [place], and was diagnosed at a walk-in-clinic with a walking pneumonia, for which she was started on doxycycline.  Pt states that the symptoms of numbness and tingling started shortly after then.  They were initially localized ot her hands and feet, but eventually included her abdomen.  She did not have any n/v, diarrhea.  She noticed in the last few days she was also weaker, and had more difficulty with ambulation, and tingling and numbness had gotten worse.  She went ot he her neurosurgeon, who found on reflex exam, that reflexes that are usually brisk for her were diminished.  She had an outpt MRI of cervical spine that showed multiple areas of stenoses.  Dr. Weston was concerned about the possibility of GBS, and sent her to the ED for further work up.    In the ED, pt had a CT head, and was seen by neurology, who recommended MRI of head and T + L spine."    GIven concern fr hyporeflexia, concern For AIDP. Patient was txed to Neurology for further management. LP performed demonstrated albuminocytologic dissociation. Patient started on 5 days of IVIG. MRI B Remote right parietal craniotomy. MRi spine multiple disc osteophytes, Questionable ventral disc herniation T3-T4__Ortho_________________PT evaluated Pt_______________ 63 yo woman who presents to [facility] on referral by her private neurologist, Dr. Barrios, w/ 1 day onset of gait instability w/ b/l acute lower extremity hyporeflexia. 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 Patient's condition was managed w/ IVIG, supportive care for neuropathy and PT/OT which recommended post-acute rehab. Any other comorbidities were all actively monitored and managed w/ both inpatient and/or home      This was preceeded by an URI (2/12/18) while traveling in [place], treated w/ doxycyline, followed by b/l peripheral neuropathy (tingling) in finger and toes (2/16/18), followed by numbness in the abdominal region (3/5/18). Patient's symptoms refractory to meloxicam and lyrica, which was started 2 days ago. Pertinent hx includes meningioma s/p craniotomy (1993) w/ regular MRI every other year, cervical stenosis (seen on MRI c-spine). Patient noted that while on her trip in [place], she did go skiing w/ multiple subsequent falls however does not recall any particular falls resulting in any significant trauma..     61 y/o f w/ PMH of meningioma s/p craniotomy, HLD, depression p/w symptoms of numbness and tingling in hands and feet for the last couple of weeks, complicated by weakness, unsteadiness, and difficulty walking.  Pt initially had upper respiratory symptoms while in [place], and was diagnosed at a walk-in-clinic with a walking pneumonia, for which she was started on doxycycline.  Pt states that the symptoms of numbness and tingling started shortly after then.  They were initially localized ot her hands and feet, but eventually included her abdomen.  She did not have any n/v, diarrhea.  She noticed in the last few days she was also weaker, and had more difficulty with ambulation, and tingling and numbness had gotten worse.  She went ot he her neurosurgeon, who found on reflex exam, that reflexes that are usually brisk for her were diminished.  She had an outpt MRI of cervical spine that showed multiple areas of stenoses.  Dr. Weston was concerned about the possibility of GBS, and sent her to the ED for further work up.    In the ED, pt had a CT head, and was seen by neurology, who recommended MRI of head and T + L spine."    GIven concern fr hyporeflexia, concern For AIDP. Patient was txed to Neurology for further management. LP performed demonstrated albuminocytologic dissociation. Patient started on 5 days of IVIG. MRI B Remote right parietal craniotomy. MRi spine multiple disc osteophytes, Questionable ventral disc herniation T3-T4__Ortho_________________PT evaluated Pt_______________ 61 yo woman who presents to [facility] on referral by her private neurologist, Dr. Barrios, w/ 1 day onset of gait instability w/ b/l acute lower extremity hyporeflexia. 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 Patient's condition was managed w/ 5 day course of IVIG, supportive care for neuropathy and PT/OT which recommended post-acute rehab. Patient also found to have possible T3/T4 ventral disc protrusion, evident on MRI, and was managed w/ supportive care. It is recommended that patient should follow up outpatient Orthopedics for further evaluation and long term management of this condition and hx of cervical myelopathy. Any other comorbidities were all actively monitored and managed w/ both inpatient and/or home meds as needed. Any electrolyte abnormalities were monitored closely and repleted as needed. Patient to be transferred to [facility] for continued PT and to follow up outpatient neurology w/ Dr. Lopez for further evaluation and long term management of current neurologic condition. Patient to also follow up outpatient neurology w/ Dr. Barriso for long term management of hx of meningioma s/p resection. Patient to also follow up outpatient PCP and/or specialist for all other comorbidities. Patient's vitals are stable and is cleared for discharge.

## 2018-03-11 NOTE — PROGRESS NOTE ADULT - SUBJECTIVE AND OBJECTIVE BOX
NEUROLOGY FOLLOW UP NOTE     Patient is a 62y old  Female who presents with a chief complaint of numbness (08 Mar 2018 18:09)      SUBJECTIVE, INTERVAL HISTORY: Patient continue to endorses numbness uncomfortable at times    OBJECTIVE:   Vital Signs Last 24 Hrs  T(C): 36.7 (11 Mar 2018 11:22), Max: 37 (10 Mar 2018 21:38)  T(F): 98.1 (11 Mar 2018 11:22), Max: 98.6 (10 Mar 2018 21:38)  HR: 81 (11 Mar 2018 11:22) (71 - 81)  BP: 102/70 (11 Mar 2018 11:22) (98/64 - 110/70)  BP(mean): --  RR: 18 (11 Mar 2018 11:22) (18 - 18)  SpO2: 97% (11 Mar 2018 11:22) (95% - 98%)                          11.7   5.16  )-----------( 234      ( 11 Mar 2018 09:13 )             35.7     03-11    137  |  105  |  20  ----------------------------<  136<H>  4.2   |  18<L>  |  0.72    Ca    9.0      11 Mar 2018 07:04  Phos  3.3     03-11  Mg     2.0     03-11      CAPILLARY BLOOD GLUCOSE            I&O's Summary    10 Mar 2018 06:01  -  11 Mar 2018 07:00  --------------------------------------------------------  IN: 615 mL / OUT: 0 mL / NET: 615 mL            MEDICATIONS  (STANDING):  acetaminophen   Tablet 325 milliGRAM(s) Oral daily  acetaminophen   Tablet. 325 milliGRAM(s) Oral daily  buPROPion  milliGRAM(s) Oral daily  cholecalciferol 2000 Unit(s) Oral every 24 hours  citalopram 10 milliGRAM(s) Oral daily  diphenhydrAMINE   Injectable 25 milliGRAM(s) IV Push daily  heparin  Injectable 5000 Unit(s) SubCutaneous every 8 hours  immune globulin gamma IVPB 25 Gram(s) IV Intermittent daily  Ospemifene (Osphena) 60 milliGRAM(s) 60 milliGRAM(s) Oral daily  pregabalin 25 milliGRAM(s) Oral daily  pregabalin 50 milliGRAM(s) Oral at bedtime  simvastatin 10 milliGRAM(s) Oral at bedtime  sodium chloride 0.9%. 1000 milliLiter(s) (65 mL/Hr) IV Continuous <Continuous>    MEDICATIONS  (PRN):  acetaminophen   Tablet 650 milliGRAM(s) Oral every 6 hours PRN For Temp greater than 38 C (100.4 F)  acetaminophen   Tablet. 650 milliGRAM(s) Oral every 6 hours PRN Mild Pain (1 - 3)      GENERAL EXAM: No acute distress      breathing appears completely comfortable, normal rate, no dyspnea           NEUROLOGICAL EXAM:  Mental Status: AAOx3, fluent speech, follows simple commands, able to name, coherent, alert  Cranial Nerves: EOMI, PERRL, VFF, V1-V3 intact, facial symmetric, tongue midline, no dysarthria  Motor: 5/5 b/l deltoid/tricep/bicep/handgrip/wrist extension and 4/5 flexion/FF,  4+/5 left dorsiflexion, HF, Ham, rest 5/5,   Sensation: decreased proprioception b/l toes, sensory level in abdominal region only in area of T5-T10 approx to PP/LT   Coordination: FTN intact b/l, unsteady gait, on functional testing patient unable to squat, unable to tandem. +rhomberg  Reflexes: 1+ R/L brachioradialis, rest arreflexic, plantars mute     RADIOLOGY:    < from: MR Lumbar Spine No Cont (03.09.18 @ 22:26) >  At T3-4 there a disc deformity and ventral cord displacement, a ventral   cord herniation is not excluded, axial T2 series 7 image 4, sagittal   series 3 image 8.    Multilevel degenerative changes  in the cervical and lumbar spine as   detailed above without  severe canal compromise or cord compression.    Questionable areas of slight high T2 signal in the cervical cord may   reflect motion, suggest repeat imaging when patient can hold still with   dimensions region.    < end of copied text > NEUROLOGY FOLLOW UP NOTE     Patient is a 62y old  Female who presents with a chief complaint of numbness (08 Mar 2018 18:09),  gait instability, falls    SUBJECTIVE, INTERVAL HISTORY: Patient continue to endorses numbness uncomfortable at times mild improvement with lyrica.  walking slightly better vs yesterday per pt    MEDICATIONS  (STANDING):  acetaminophen   Tablet 325 milliGRAM(s) Oral daily  acetaminophen   Tablet. 325 milliGRAM(s) Oral daily  buPROPion  milliGRAM(s) Oral daily  cholecalciferol 2000 Unit(s) Oral every 24 hours  citalopram 10 milliGRAM(s) Oral daily  diphenhydrAMINE   Injectable 25 milliGRAM(s) IV Push daily  heparin  Injectable 5000 Unit(s) SubCutaneous every 8 hours  immune globulin gamma IVPB 25 Gram(s) IV Intermittent daily  Ospemifene (Osphena) 60 milliGRAM(s) 60 milliGRAM(s) Oral daily  pregabalin 25 milliGRAM(s) Oral daily  pregabalin 50 milliGRAM(s) Oral at bedtime  simvastatin 10 milliGRAM(s) Oral at bedtime  sodium chloride 0.9%. 1000 milliLiter(s) (65 mL/Hr) IV Continuous <Continuous>    MEDICATIONS  (PRN):  acetaminophen   Tablet 650 milliGRAM(s) Oral every 6 hours PRN For Temp greater than 38 C (100.4 F)  acetaminophen   Tablet. 650 milliGRAM(s) Oral every 6 hours PRN Mild Pain (1 - 3)      OBJECTIVE:   Vital Signs Last 24 Hrs  T(C): 36.7 (11 Mar 2018 11:22), Max: 37 (10 Mar 2018 21:38)  T(F): 98.1 (11 Mar 2018 11:22), Max: 98.6 (10 Mar 2018 21:38)  HR: 81 (11 Mar 2018 11:22) (71 - 81)  BP: 102/70 (11 Mar 2018 11:22) (98/64 - 110/70)  RR: 18 (11 Mar 2018 11:22) (18 - 18)  SpO2: 97% (11 Mar 2018 11:22) (95% - 98%)    GENERAL EXAM: No acute distress      breathing appears completely comfortable, normal rate, no dyspnea           NEUROLOGICAL EXAM:  Mental Status: AAOx3, fluent speech, follows simple commands, able to name, coherent, alert  Cranial Nerves: EOMI, PERRL, VFF, V1-V3 intact, facial symmetric, tongue midline, no dysarthria  Motor: 5/5 b/l deltoid/tricep/bicep/handgrip/wrist extension and 4/5 flexion/FF,  4+/5 left dorsiflexion, HF, Ham, rest 5/5,   Sensation: decreased proprioception b/l toes, sensory level in abdominal region only in area of T5-T10 approx to PP/LT   Coordination: FTN intact b/l, unsteady gait, on functional testing patient unable to squat, unable to tandem. +rhomberg  Reflexes: 1+ R/L brachioradialis, rest arreflexic, plantars mute                             11.7   5.16  )-----------( 234      ( 11 Mar 2018 09:13 )             35.7     03-11    137  |  105  |  20  ----------------------------<  136<H>  4.2   |  18<L>  |  0.72    Ca    9.0      11 Mar 2018 07:04  Phos  3.3     03-11  Mg     2.0     03-11      CAPILLARY BLOOD GLUCOSE    I&O's Summary    10 Mar 2018 06:01  -  11 Mar 2018 07:00  --------------------------------------------------------  IN: 615 mL / OUT: 0 mL / NET: 615 mL    RADIOLOGY:    < from: MR Lumbar Spine No Cont (03.09.18 @ 22:26) >  At T3-4 there a disc deformity and ventral cord displacement, a ventral   cord herniation is not excluded, axial T2 series 7 image 4, sagittal   series 3 image 8.    Multilevel degenerative changes  in the cervical and lumbar spine as   detailed above without  severe canal compromise or cord compression.    Questionable areas of slight high T2 signal in the cervical cord may   reflect motion, suggest repeat imaging when patient can hold still with   dimensions region.    < end of copied text >

## 2018-03-11 NOTE — PROGRESS NOTE ADULT - PROBLEM SELECTOR PLAN 1
DDX includes gbs vs aidp vs cervical/thoracic myelopathy  Pt s/p LP, results noted  Pt on empiric course of IVIG    NIF and VC qd  Increase lyrica to 25mg and 50mg qhs for symptomatic control   PT eval recommending home PT, pt would like to go to rehab, PT reeval pending   OT/SS eval pending   Neuro recs appreciated

## 2018-03-11 NOTE — DISCHARGE NOTE ADULT - PLAN OF CARE
management and Prevention Continue with current Meds  Please follow up with Dr. Lopez resolution of symptoms -c/w PT at acute rehab   -c/w home meds as prescribed   -follow up w/ Dr. Lopez [phone], for long term management long term management -follow up w/ Dr. Barrios (neurology) for long term management -c/w PT at acute rehab  -follow up outpatient ortho for further evaluation and long term management -follow up w/ Dr. Lopez  (neurology) for long term management

## 2018-03-11 NOTE — DISCHARGE NOTE ADULT - MEDICATION SUMMARY - MEDICATIONS TO TAKE
I will START or STAY ON the medications listed below when I get home from the hospital:    freetext medication  -  -- 60 milligram(s) by mouth once a day  -- Indication: For osteo-pathology     acetaminophen 325 mg oral tablet  -- 2 tab(s) by mouth every 6 hours, As needed, For Temp greater than 38 C (100.4 F)  -- Indication: For Fever     acetaminophen 325 mg oral tablet  -- 2 tab(s) by mouth every 6 hours, As needed, Mild Pain (1 - 3)  -- Indication: For pain     acetaminophen 325 mg oral tablet  -- 1 tab(s) by mouth once a day  -- Indication: For pain     acetaminophen 325 mg oral tablet  -- 1 tab(s) by mouth once a day  -- Indication: For pain     heparin  -- Indication: For DVT prophylaxis     Lyrica 25 mg oral capsule  -- 1 tab(s) by mouth once a day  -- Indication: For Neuropathy     CeleXA 10 mg oral tablet  -- 1 tab(s) by mouth once a day  -- Indication: For MDD     simvastatin 10 mg oral tablet  -- 1 tab(s) by mouth once a day (at bedtime)  -- Indication: For HLD     Osphena 60 mg oral tablet  -- 1 tab(s) by mouth once a day  -- Indication: For osteo-pathology     Wellbutrin  -- 225 milligram(s) by mouth once a day  -- Indication: For MDD     Vitamin D3 1000 intl units oral tablet  -- 2 tab(s) by mouth once a day  -- Indication: For Health maintenance I will START or STAY ON the medications listed below when I get home from the hospital:    freetext medication  -  -- 60 milligram(s) by mouth once a day  -- Indication: For osteo-pathology     acetaminophen 325 mg oral tablet  -- 2 tab(s) by mouth every 6 hours, As needed, For Temp greater than 38 C (100.4 F)  -- Indication: For Fever     acetaminophen 325 mg oral tablet  -- 2 tab(s) by mouth every 6 hours, As needed, Mild Pain (1 - 3)  -- Indication: For pain     acetaminophen 325 mg oral tablet  -- 1 tab(s) by mouth once a day  -- Indication: For pain     acetaminophen 325 mg oral tablet  -- 1 tab(s) by mouth once a day  -- Indication: For pain     heparin  -- Indication: For DVT prophylaxis     CeleXA 10 mg oral tablet  -- 1 tab(s) by mouth once a day  -- Indication: For MDD     simvastatin 10 mg oral tablet  -- 1 tab(s) by mouth once a day (at bedtime)  -- Indication: For HLD     Osphena 60 mg oral tablet  -- 1 tab(s) by mouth once a day  -- Indication: For osteo-pathology     Wellbutrin  -- 225 milligram(s) by mouth once a day  -- Indication: For MDD     Vitamin D3 1000 intl units oral tablet  -- 2 tab(s) by mouth once a day  -- Indication: For Health maintenance

## 2018-03-11 NOTE — DISCHARGE NOTE ADULT - ABILITY TO HEAR (WITH HEARING AID OR HEARING APPLIANCE IF NORMALLY USED):
Adequate: hears normal conversation without difficulty
respirations non-labored/no wheezes/no subcutaneous emphysema/good air movement/no chest wall tenderness/no intercostal retractions/no rales/airway patent/no rhonchi/normal/clear to auscultation bilaterally/breath sounds equal

## 2018-03-11 NOTE — PROGRESS NOTE ADULT - ASSESSMENT
62 year old F presenting with 3 week history of b/l hand and feet tingling, unsteady gait and feeling of oppression in abdominal region. PE remarkable for arreflexia, T6-T10  band like sensory level, symmetrical leg and hand sensory loss (JPS, LT > PP) and mild wrist flexion weakness, and L>R hip flexion , ham, tibialis anterior weak on exam.     Imaging remarkable for T3-T4 ventral cord displacement and possible increased T2 signal on cervical cord and degenerative spinal canal stenosis. Further workup remarkable for CSF protein 64.     Suspect primary etiology of symptoms 2/2 AIDP vs ? CIDP given hporeflexia. There may be an underlying central process given MRI findings andThoracic sensory level.  -Rec EMG in very near future (if possible) to elucidate concerns for LMN dysfunction/GBS.  - c/w IVIG for possible AIDP at current dose given unclear diagnosis and precending URI illness w/ arreflexia and elevated protein   - no airway compromise, will decrease NIF and VC to qd   - Physical (rec home physical therapy vs outpt PT)and Occupational Therapy 62 year old F presenting with 3 week history of b/l hand and feet tingling, unsteady gait and abn sensation in the abdominal region following recent URIs, ski trip with frequent falls. PE remarkable for arreflexia, T6-T10  band like sensory level, symmetrical leg and hand sensory loss (JPS, LT > PP) and mild wrist flexion weakness, and L>R hip flexion , ham, tibialis anterior weak on exam.     Imaging remarkable for T3-T4 ventral cord displacement and possible increased T2 signal on cervical cord and degenerative spinal canal stenosis. Further workup remarkable for CSF protein 64.     Suspect primary etiology of symptoms 2/2 AIDP vs ? CIDP given now diffiuse hyporeflexia. There may be an underlying central process given MRI findings and thoracic sensory level.  - Rec EMG in very near future (if possible) to elucidate concerns for LMN dysfunction/GBS.  - c/w IVIG for possible AIDP at current dose given unclear diagnosis and precending URI illness w/ arreflexia and elevated protein   - no airway compromise, will decrease NIF and VC to qd   - lyrica for painful dysethesias as needd  - Physical (rec home physical therapy vs outpt PT)and Occupational Therapy 62 year old F presenting with 3 week history of b/l hand and feet tingling, unsteady gait and abn sensation in the abdominal region following recent URIs, ski trip with frequent falls. PE remarkable for arreflexia, T6-T10  band like sensory level, symmetrical leg and hand sensory loss (JPS, LT > PP) and mild wrist flexion weakness, and L>R hip flexion , ham, tibialis anterior weak on exam.     Imaging remarkable for T3-T4 ventral cord displacement and possible increased T2 signal on cervical cord and degenerative spinal canal stenosis. Further workup remarkable for CSF protein 64.     Suspect primary etiology of symptoms 2/2 AIDP vs ? CIDP given now diffiuse hyporeflexia and albuminocytologic dissociation . There may be an underlying central process given MRI findings and thoracic sensory level.  - Rec EMG in very near future (if possible) to elucidate concerns for LMN dysfunction/GBS.  - c/w IVIG x 5 days for possible AIDP at current dose given unclear diagnosis and preceding URI illness w/ arreflexia and elevated protein( started 3/9)  - no airway compromise, will decrease NIF and VC to qd   - lyrica for painful dysethesias as needed  - Physical (rec home physical therapy vs outpt PT)and Occupational Therapy

## 2018-03-11 NOTE — DISCHARGE NOTE ADULT - PROVIDER TOKENS
VIVIANA:'[id number]' TOKEN:'[id number]',FREE:[LAST:[PCP],PHONE:[(   )    -],FAX:[(   )    -]],FREE:[LAST:[Denis],PHONE:[(   )    -],FAX:[(   )    -],ADDRESS:[Neurology]],FREE:[LAST:[Orthopedic],PHONE:[(   )    -],FAX:[(   )    -]]

## 2018-03-11 NOTE — DISCHARGE NOTE ADULT - CARE PLAN
Principal Discharge DX:	GBS (Guillain [place] syndrome)  Goal:	management and Prevention  Assessment and plan of treatment:	Continue with current Meds  Please follow up with Dr. Lopez Principal Discharge DX:	GBS (Guillain [place] syndrome)  Goal:	resolution of symptoms  Assessment and plan of treatment:	-c/w PT at acute rehab   -c/w home meds as prescribed   -follow up w/ Dr. Lopez [phone], for long term management  Secondary Diagnosis:	Meningioma  Goal:	long term management  Assessment and plan of treatment:	-follow up w/ Dr. Barrios (neurology) for long term management  Secondary Diagnosis:	Thoracic myelopathy  Goal:	long term management  Assessment and plan of treatment:	-c/w PT at acute rehab  -follow up outpatient ortho for further evaluation and long term management Principal Discharge DX:	GBS (Guillain [place] syndrome)  Goal:	resolution of symptoms  Assessment and plan of treatment:	-c/w PT at acute rehab   -c/w home meds as prescribed   -follow up w/ Dr. Lopez [phone], for long term management  Secondary Diagnosis:	Meningioma  Goal:	long term management  Assessment and plan of treatment:	-follow up w/ Dr. Lopez  (neurology) for long term management  Secondary Diagnosis:	Thoracic myelopathy  Goal:	long term management  Assessment and plan of treatment:	-c/w PT at acute rehab  -follow up outpatient ortho for further evaluation and long term management

## 2018-03-11 NOTE — DISCHARGE NOTE ADULT - CARE PROVIDER_API CALL
Ezequiel Lopez (MD), Neurology  [address] [address]  Phone: [phone]  Fax: [phone] Ezequiel Lopez), Neurology  [address] [address]  Phone: [phone]  Fax: [phone]    PCP,   Phone: (   )    -  Fax: (   )    -    Denis,   Neurology  Phone: (   )    -  Fax: (   )    -    Orthopedic,   Phone: (   )    -  Fax: (   )    -

## 2018-03-11 NOTE — PROGRESS NOTE ADULT - SUBJECTIVE AND OBJECTIVE BOX
Patient is a 62y old  Female who presents with a chief complaint of numbness (11 Mar 2018 15:45)      SUBJECTIVE / OVERNIGHT EVENTS: Reports still with some numbness and tingling but better, had bm, no cp, sob    MEDICATIONS  (STANDING):  acetaminophen   Tablet 325 milliGRAM(s) Oral daily  acetaminophen   Tablet. 325 milliGRAM(s) Oral daily  buPROPion  milliGRAM(s) Oral daily  cholecalciferol 2000 Unit(s) Oral every 24 hours  citalopram 10 milliGRAM(s) Oral daily  diphenhydrAMINE   Injectable 25 milliGRAM(s) IV Push daily  heparin  Injectable 5000 Unit(s) SubCutaneous every 8 hours  immune globulin gamma IVPB 25 Gram(s) IV Intermittent daily  Ospemifene (Osphena) 60 milliGRAM(s) 60 milliGRAM(s) Oral daily  pregabalin 25 milliGRAM(s) Oral daily  pregabalin 50 milliGRAM(s) Oral at bedtime  simvastatin 10 milliGRAM(s) Oral at bedtime  sodium chloride 0.9%. 1000 milliLiter(s) (65 mL/Hr) IV Continuous <Continuous>    MEDICATIONS  (PRN):  acetaminophen   Tablet 650 milliGRAM(s) Oral every 6 hours PRN For Temp greater than 38 C (100.4 F)  acetaminophen   Tablet. 650 milliGRAM(s) Oral every 6 hours PRN Mild Pain (1 - 3)        CAPILLARY BLOOD GLUCOSE        I&O's Summary    10 Mar 2018 06:01  -  11 Mar 2018 07:00  --------------------------------------------------------  IN: 615 mL / OUT: 0 mL / NET: 615 mL    11 Mar 2018 07:01  -  11 Mar 2018 16:02  --------------------------------------------------------  IN: 480 mL / OUT: 0 mL / NET: 480 mL        PHYSICAL EXAM:  GENERAL: NAD, well-developed  HEAD:  Atraumatic, Normocephalic  EYES: EOMI, PERRLA, conjunctiva and sclera clear  NECK: Supple, No JVD  CHEST/LUNG: Clear to auscultation bilaterally; No wheeze  HEART: Regular rate and rhythm; S1S2  ABDOMEN: Soft, Nontender, Nondistended; Bowel sounds present  EXTREMITIES:  2+ Peripheral Pulses, No clubbing, cyanosis, or edema  PSYCH: AAOx3  NEUROLOGY: non-focal  SKIN: No rashes or lesions    LABS:                        11.7   5.16  )-----------( 234      ( 11 Mar 2018 09:13 )             35.7     03-11    137  |  105  |  20  ----------------------------<  136<H>  4.2   |  18<L>  |  0.72    Ca    9.0      11 Mar 2018 07:04  Phos  3.3     03-11  Mg     2.0     03-11                RADIOLOGY & ADDITIONAL TESTS:    Imaging Personally Reviewed:    Consultant(s) Notes Reviewed:  neuro    Care Discussed with Consultants/Other Providers:

## 2018-03-12 LAB
ALBUMIN CSF-MCNC: 57.4 MG/DL — HIGH (ref 14–25)
CULTURE RESULTS: NO GROWTH — SIGNIFICANT CHANGE UP
IGG CSF-MCNC: 6.4 MG/DL — HIGH
IGG SYNTH RATE SER+CSF CALC-MRATE: -23.5 MG/DAY — SIGNIFICANT CHANGE UP
IGG/ALB CSF: 0.11 RATIO — SIGNIFICANT CHANGE UP
PROT CSF-MCNC: 64 MG/DL — HIGH (ref 15–45)
SPECIMEN SOURCE: SIGNIFICANT CHANGE UP

## 2018-03-12 PROCEDURE — 99231 SBSQ HOSP IP/OBS SF/LOW 25: CPT

## 2018-03-12 PROCEDURE — 99233 SBSQ HOSP IP/OBS HIGH 50: CPT

## 2018-03-12 PROCEDURE — 74230 X-RAY XM SWLNG FUNCJ C+: CPT | Mod: 26

## 2018-03-12 RX ORDER — IMMUNE GLOBULIN,GAMMA(IGG) 5 %
25 VIAL (ML) INTRAVENOUS DAILY
Qty: 0 | Refills: 0 | Status: COMPLETED | OUTPATIENT
Start: 2018-03-13 | End: 2018-03-13

## 2018-03-12 RX ORDER — BUPROPION HYDROCHLORIDE 150 MG/1
150 TABLET, EXTENDED RELEASE ORAL DAILY
Qty: 0 | Refills: 0 | Status: DISCONTINUED | OUTPATIENT
Start: 2018-03-12 | End: 2018-03-15

## 2018-03-12 RX ORDER — IMMUNE GLOBULIN,GAMMA(IGG) 5 %
35 VIAL (ML) INTRAVENOUS DAILY
Qty: 0 | Refills: 0 | Status: COMPLETED | OUTPATIENT
Start: 2018-03-12 | End: 2018-03-12

## 2018-03-12 RX ORDER — BUPROPION HYDROCHLORIDE 150 MG/1
225 TABLET, EXTENDED RELEASE ORAL DAILY
Qty: 0 | Refills: 0 | Status: DISCONTINUED | OUTPATIENT
Start: 2018-03-12 | End: 2018-03-12

## 2018-03-12 RX ORDER — SODIUM CHLORIDE 9 MG/ML
500 INJECTION INTRAMUSCULAR; INTRAVENOUS; SUBCUTANEOUS ONCE
Qty: 0 | Refills: 0 | Status: COMPLETED | OUTPATIENT
Start: 2018-03-12 | End: 2018-03-12

## 2018-03-12 RX ORDER — SODIUM CHLORIDE 9 MG/ML
250 INJECTION INTRAMUSCULAR; INTRAVENOUS; SUBCUTANEOUS ONCE
Qty: 0 | Refills: 0 | Status: COMPLETED | OUTPATIENT
Start: 2018-03-12 | End: 2018-03-12

## 2018-03-12 RX ORDER — SODIUM CHLORIDE 9 MG/ML
250 INJECTION INTRAMUSCULAR; INTRAVENOUS; SUBCUTANEOUS
Qty: 0 | Refills: 0 | Status: DISCONTINUED | OUTPATIENT
Start: 2018-03-12 | End: 2018-03-12

## 2018-03-12 RX ADMIN — Medication 2000 UNIT(S): at 13:59

## 2018-03-12 RX ADMIN — BUPROPION HYDROCHLORIDE 150 MILLIGRAM(S): 150 TABLET, EXTENDED RELEASE ORAL at 09:12

## 2018-03-12 RX ADMIN — Medication 325 MILLIGRAM(S): at 13:59

## 2018-03-12 RX ADMIN — SODIUM CHLORIDE 1000 MILLILITER(S): 9 INJECTION INTRAMUSCULAR; INTRAVENOUS; SUBCUTANEOUS at 20:08

## 2018-03-12 RX ADMIN — Medication 325 MILLIGRAM(S): at 22:18

## 2018-03-12 RX ADMIN — Medication 25 MILLIGRAM(S): at 22:18

## 2018-03-12 RX ADMIN — SODIUM CHLORIDE 1000 MILLILITER(S): 9 INJECTION INTRAMUSCULAR; INTRAVENOUS; SUBCUTANEOUS at 00:45

## 2018-03-12 RX ADMIN — SIMVASTATIN 10 MILLIGRAM(S): 20 TABLET, FILM COATED ORAL at 21:53

## 2018-03-12 RX ADMIN — Medication 50 MILLIGRAM(S): at 21:53

## 2018-03-12 RX ADMIN — Medication 650 MILLIGRAM(S): at 09:12

## 2018-03-12 RX ADMIN — Medication 325 MILLIGRAM(S): at 14:30

## 2018-03-12 RX ADMIN — Medication 43.75 GRAM(S): at 22:54

## 2018-03-12 RX ADMIN — Medication 650 MILLIGRAM(S): at 09:45

## 2018-03-12 RX ADMIN — CITALOPRAM 10 MILLIGRAM(S): 10 TABLET, FILM COATED ORAL at 14:00

## 2018-03-12 RX ADMIN — Medication 25 MILLIGRAM(S): at 14:19

## 2018-03-12 NOTE — SWALLOW VFSS/MBS ASSESSMENT ADULT - ORAL PHASE COMMENTS
Maximal spillover to the level of the valleculae. Trace to minimal lingual residue. Maximal spillover to the level of the valleculae and minimal passive overflow into the superior hypopharynx. This occurred after onset of hyoid movement, but prior to onset of epiglottic retroflexion Inconsistent maximal spillover to the level of the valleculae and minimal passive overflow into the hypopharynx (at times to the superior hypopharynx and one episode to the pyriform sinuses). This occurred after onset of hyoid movement, but prior to onset of epiglottic retroflexion. There was one episode of penetration over the laryngeal surface of the epiglottis. There was spillover into the valleculae (moderate) and trace passive spillover to the pyriform sinuses. This was WFL for texture

## 2018-03-12 NOTE — PROGRESS NOTE ADULT - SUBJECTIVE AND OBJECTIVE BOX
Patient is a 62y old  Female who presents with a chief complaint of numbness (11 Mar 2018 15:45)      SUBJECTIVE / OVERNIGHT EVENTS: Pt still reports numbness and tingling.  Last night did not tolerate full dose of IVIg due to hypotension.      12 point review of systems otherwise negative    MEDICATIONS  (STANDING):  acetaminophen   Tablet 325 milliGRAM(s) Oral daily  acetaminophen   Tablet. 325 milliGRAM(s) Oral daily  buPROPion  milliGRAM(s) Oral daily  cholecalciferol 2000 Unit(s) Oral every 24 hours  citalopram 10 milliGRAM(s) Oral daily  diphenhydrAMINE   Injectable 25 milliGRAM(s) IV Push daily  heparin  Injectable 5000 Unit(s) SubCutaneous every 8 hours  immune globulin gamma IVPB 25 Gram(s) IV Intermittent daily  Ospemifene (Osphena) 60 milliGRAM(s) 60 milliGRAM(s) Oral daily  pregabalin 25 milliGRAM(s) Oral daily  pregabalin 50 milliGRAM(s) Oral at bedtime  simvastatin 10 milliGRAM(s) Oral at bedtime  sodium chloride 0.9%. 1000 milliLiter(s) (65 mL/Hr) IV Continuous <Continuous>  sodium chloride 0.9%. 1000 milliLiter(s) (65 mL/Hr) IV Continuous <Continuous>    MEDICATIONS  (PRN):  acetaminophen   Tablet 650 milliGRAM(s) Oral every 6 hours PRN For Temp greater than 38 C (100.4 F)  acetaminophen   Tablet. 650 milliGRAM(s) Oral every 6 hours PRN Mild Pain (1 - 3)      Vital Signs Last 24 Hrs  T(C): 37.2 (12 Mar 2018 08:50), Max: 37.3 (12 Mar 2018 05:30)  T(F): 98.9 (12 Mar 2018 08:50), Max: 99.1 (12 Mar 2018 05:30)  HR: 74 (12 Mar 2018 10:08) (63 - 81)  BP: 122/72 (12 Mar 2018 10:08) (80/53 - 122/72)  BP(mean): --  RR: 18 (12 Mar 2018 08:50) (18 - 18)  SpO2: 99% (12 Mar 2018 10:08) (95% - 99%)  CAPILLARY BLOOD GLUCOSE        I&O's Summary    11 Mar 2018 07:01  -  12 Mar 2018 07:00  --------------------------------------------------------  IN: 960 mL / OUT: 0 mL / NET: 960 mL        PHYSICAL EXAM:  GENERAL: NAD, well-developed  HEAD:  Atraumatic, Normocephalic  EYES: EOMI, PERRL, conjunctiva and sclera clear  NECK: Supple, No JVD  CHEST/LUNG: Clear to auscultation bilaterally; No wheeze  HEART: Regular rate and rhythm; No murmurs, rubs, or gallops  ABDOMEN: Soft, Nontender, Nondistended; Bowel sounds present  EXTREMITIES:  2+ Peripheral Pulses, No clubbing, cyanosis, or edema  PSYCH: AAOx3  NEUROLOGY: non-focal  SKIN: No rashes or lesions    LABS:                        11.7   5.16  )-----------( 234      ( 11 Mar 2018 09:13 )             35.7     03-11    137  |  105  |  20  ----------------------------<  136<H>  4.2   |  18<L>  |  0.72    Ca    9.0      11 Mar 2018 07:04  Phos  3.3     03-11  Mg     2.0     03-11                RADIOLOGY & ADDITIONAL TESTS:    Imaging Personally Reviewed:    Consultant(s) Notes Reviewed:      Care Discussed with Consultants/Other Providers:

## 2018-03-12 NOTE — PROGRESS NOTE ADULT - PROBLEM SELECTOR PLAN 1
given now diffiuse hyporeflexia and albuminocytologic dissociation. Abnormal MRI findings not concerning for cord compression.    - c/w IVIG 3+4th dose of 5.   - no airway compromise, will decrease NIF and VC to qd   - lyrica for painful dysethesias as needed  - PT/OT: acute rehab. given now diffuse hyporeflexia and albuminocytologic dissociation. Abnormal MRI findings not concerning for cord compression.    - c/w IVIG 3+4th dose of 5.   - no airway compromise, will decrease NIF and VC to qd   - lyrica for painful dysethesias as needed  - PT/OT: acute rehab.

## 2018-03-12 NOTE — PROGRESS NOTE ADULT - PROBLEM SELECTOR PLAN 1
DDX includes gbs vs aidp vs cervical/thoracic myelopathy  Pt s/p LP, results noted  Pt on empiric course of IVIG    NIF and VC qd  Lyrica dose increased, will continue with current regimen   PT eval recommending home PT, pt would like to go to rehab, PT re-eval pending   Neuro recs appreciated DDX includes gbs vs aidp vs cervical/thoracic myelopathy  Pt s/p LP, results noted  Pt on empiric course of IVIG    NIF and VC qd  Lyrica dose increased, will continue with current regimen   PT eval recommending home PT, pt would like to go to rehab, PT re-eval pending   Neuro recs appreciated  Can consider checking B12, and folate level in the setting of ataxia and paresthesias

## 2018-03-12 NOTE — SWALLOW VFSS/MBS ASSESSMENT ADULT - ESOPHAGEAL STAGE
Splitting of the bolus in the anterior/superior cervical esophagus. There was evidence of a small CP bar as well as a trace column of contrast in the cervical esophagus Splitting of the bolus in the anterior/superior cervical esophagus. There was evidence of a small CP bar. Splitting of the bolus in the anterior/superior cervical esophagus. There was evidence of a small CP bar

## 2018-03-12 NOTE — OCCUPATIONAL THERAPY INITIAL EVALUATION ADULT - ADDITIONAL COMMENTS
They were initially localized ot her hands and feet, but eventually included her abdomen.  She noticed in the last few days she was also weaker, and had more difficulty with ambulation, and tingling and numbness had gotten worse.  She went ot he her neurosurgeon, who found on reflex exam, that reflexes that are usually brisk for her were diminished.  MR H: At T3-4 there a disc deformity and ventral cord displacement, a ventral cord herniation is not excluded, axial T2 series 7 image 4, sagittal series 3 image 8.Multilevel degenerative changes  in the cervical and lumbar spine as detailed above without  severe canal compromise or cord compression. CT Brain:  Evidence of focal prior right parietal craniotomy without significant parenchymal changes beneath the craniotomy. US Thyroid: 10 mm hypoechoic right thyroid nodule with peripheral vascularity corresponding to nodule noted on MR cervical spine

## 2018-03-12 NOTE — OCCUPATIONAL THERAPY INITIAL EVALUATION ADULT - FINE MOTOR COORDINATION TRAINING, OT EVAL
Pt will improve fine motor coordination to increase ability to participate in ADLs and functional activities within 4 weeks.

## 2018-03-12 NOTE — OCCUPATIONAL THERAPY INITIAL EVALUATION ADULT - DIAGNOSIS, OT EVAL
Pt demonstrated decreased strength, functional mobility, balance, fine motor coordination, motor control and sensation

## 2018-03-12 NOTE — PROGRESS NOTE ADULT - ASSESSMENT
63 yo woman who presents to [facility] on referral by her private neurologist, Dr. Barrios, w/ 1 day onset of gait instability w/ b/l LE hypo-reflexia. This was preceeded by an URI (2/12/18) while traveling in [facility], treated w/ doxycyline, followed by b/l peripheral neuropathy (tingling) in finger and toes (2/16/18), followed by numbness in the abdominal region (3/5/18). Patient's symptoms refractory to meloxicam and lyrica, which was started 2 days ago. Pertinent hx includes meningioma s/p craniotomy (1993) w/ regular MRI every other year, cervical stenosis (seen on MRI c-spine). Patient noted that while on her trip in [place], she did go skiing w/ multiple subsequent falls however does not recall any particular falls resulting in any significant trauma.

## 2018-03-12 NOTE — PROGRESS NOTE ADULT - SUBJECTIVE AND OBJECTIVE BOX
Neurology Follow up note    Name: ROSETTE GONZALEZ    Subjective: Overnight, patient's became hypotensive, remainder of 3rd dose of IVIG was held, bolus 250mg NS, patient's BP now stable 108/70. Patient to continue w/ remainder of 3rd dose today as well as 4th dose of IVIG today. Pending PT for recommendations regarding rehab. Patient otherwise stable.      HPI: 63 yo woman who presents to [facility] on referral by her private neurologist, Dr. Barrios, w/ 1 day onset of gait instability w/ b/l LE hypo-reflexia. This was preceeded by an URI (2/12/18) while traveling in [facility], treated w/ doxycyline, followed by b/l peripheral neuropathy (tingling) in finger and toes (2/16/18), followed by numbness in the abdominal region (3/5/18). Patient's symptoms refractory to meloxicam and lyrica, which was started 2 days ago. Pertinent hx includes meningioma s/p craniotomy (1993) w/ regular MRI every other year, cervical stenosis (seen on MRI c-spine). Patient noted that while on her trip in [facility], she did go skiing w/ multiple subsequent falls however does not recall any particular falls resulting in any significant trauma.    PMH/PSH:   HLD  meningioma s/p craniotomy w/ residual right patellar hyper-reflexia   MDD    post-menopausal     MEDICATIONS  (STANDING):  acetaminophen   Tablet 325 milliGRAM(s) Oral daily  acetaminophen   Tablet. 325 milliGRAM(s) Oral daily  buPROPion  milliGRAM(s) Oral daily  cholecalciferol 2000 Unit(s) Oral every 24 hours  citalopram 10 milliGRAM(s) Oral daily  diphenhydrAMINE   Injectable 25 milliGRAM(s) IV Push daily  heparin  Injectable 5000 Unit(s) SubCutaneous every 8 hours  immune globulin gamma IVPB 25 Gram(s) IV Intermittent daily  Ospemifene (Osphena) 60 milliGRAM(s) 60 milliGRAM(s) Oral daily  pregabalin 25 milliGRAM(s) Oral daily  pregabalin 50 milliGRAM(s) Oral at bedtime  simvastatin 10 milliGRAM(s) Oral at bedtime  sodium chloride 0.9%. 1000 milliLiter(s) (65 mL/Hr) IV Continuous <Continuous>  sodium chloride 0.9%. 1000 milliLiter(s) (65 mL/Hr) IV Continuous <Continuous>    MEDICATIONS  (PRN):  acetaminophen   Tablet 650 milliGRAM(s) Oral every 6 hours PRN For Temp greater than 38 C (100.4 F)  acetaminophen   Tablet. 650 milliGRAM(s) Oral every 6 hours PRN Mild Pain (1 - 3)    Allergies: No Known Allergies    Objective:   Vital Signs Last 24 Hrs  T(C): 37.2 (12 Mar 2018 12:02), Max: 37.3 (12 Mar 2018 05:30)  T(F): 98.9 (12 Mar 2018 12:02), Max: 99.1 (12 Mar 2018 05:30)  HR: 68 (12 Mar 2018 12:02) (63 - 79)  BP: 108/70 (12 Mar 2018 12:02) (80/53 - 122/72)  RR: 18 (12 Mar 2018 12:02) (18 - 18)  SpO2: 95% (12 Mar 2018 12:02) (95% - 99%)    GENERAL EXAM: No acute distress      breathing appears completely comfortable, normal rate, no dyspnea           NEUROLOGICAL EXAM:  Mental Status: AAOx3, fluent speech, follows simple commands, able to name, coherent, alert  Motor: 5/5 b/l deltoid/tricep/bicep/handgrip/wrist extension and 4/5 flexion/FF,  4+/5 left dorsiflexion, HF, Ham, rest 5/5,   Sensation: decreased proprioception b/l toes, sensory level in abdominal region only in area of T5-T10 approx to PP/LT   Coordination: FTN intact b/l, unsteady gait, on functional testing patient unable to squat, unable to tandem. +rhomberg  Reflexes: 1+ R/L brachioradialis, rest arreflexic, plantars mute     03-11    137  |  105  |  20  ----------------------------<  136<H>  4.2   |  18<L>  |  0.72    Ca    9.0      11 Mar 2018 07:04  Phos  3.3     03-11  Mg     2.0     03-11    Radiology    < from: MR Lumbar Spine No Cont (03.09.18 @ 22:26) >  At T3-4 there a disc deformity and ventral cord displacement, a ventral   cord herniation is not excluded, axial T2 series 7 image 4, sagittal   series 3 image 8.    Multilevel degenerative changes  in the cervical and lumbar spine as   detailed above without  severe canal compromise or cord compression.    Questionable areas of slight high T2 signal in the cervical cord may   reflect motion, suggest repeat imaging when patient can hold still with   dimensions region.    < end of copied text >     < from: MR Head No Cont (03.09.18 @ 22:26) >  IMPRESSION:     Nonspecific hyperintense T2 signal in the white matter likely   microvascular disease without evidence for any restricted diffusion,   hemorrhage or midline shift.    < end of copied text >

## 2018-03-12 NOTE — SWALLOW VFSS/MBS ASSESSMENT ADULT - NS SWALLOW VFSS REC ASPIR MON
fever/pneumonia/change of breathing pattern/cough/gurgly voice/Monitor for s/s aspiration/laryngeal penetration. If noted:  D/C p.o. intake, provide non-oral nutrition/hydration/meds, and contact this service @ [phone]/throat clearing/upper respiratory infection

## 2018-03-12 NOTE — SWALLOW VFSS/MBS ASSESSMENT ADULT - DIAGNOSTIC IMPRESSIONS
Patient presents with mild braden-pharyngo-cervical esophageal dysphagia with a delay in trigger of the swallow reflex, inconsistent/trace/transient laryngeal penetration of nectar thick and thin fluids, evidence of a small CP bar that resulted in trace/inconsistent rentention in the cervical esophagus.

## 2018-03-12 NOTE — SWALLOW VFSS/MBS ASSESSMENT ADULT - LARYNGEAL PENETRATION DURING THE SWALLOW - SILENT
trace penetration along the laryngeal surface of the epiglottis and arytenoids. Material did not reach below this level and was cleared over the superior laryngeal surface of the arytenoids. Did not descend below this region and was cleared during the swallow based upon appearance, there was a suggestion of penetration via the interarytenoid space and over the superior laryngeal surface of the arytenoids. This cleared during the swallow/Trace trace penetration over the superior laryngeal surface of the arytenoids. Material did not descend lower than this and it was cleared spontaneously during the swallow/Trace

## 2018-03-12 NOTE — SWALLOW VFSS/MBS ASSESSMENT ADULT - RECOMMENDED CONSISTENCY
Regular diet. Guidelines for oral intake: 1) Utilize chin tuck (FULL NECK FLEXION) with all p.o. fluids and mixed textures  2) Small single bites and sips at slow rate 3) Aspiration precautions. Monitor for s/s aspiration/laryngeal penetration. If noted:  D/C p.o. intake, provide non-oral nutrition/hydration/meds

## 2018-03-12 NOTE — SWALLOW BEDSIDE ASSESSMENT ADULT - SLP GENERAL OBSERVATIONS
Pt alert, oriented, able to follow commands and answer all personally relevant questions. Stated that she currently does not have facial paresthesias (and when she does, it is only V2-V3). Denied voice, speech changes, and swallowing difficulty, but then did state that "sometimes I get a tickle and I cough". When questioned further, pt stated that she notes that with and without p.o

## 2018-03-12 NOTE — OCCUPATIONAL THERAPY INITIAL EVALUATION ADULT - MOTOR COORDINATION TRAINING, PT EVAL
Pt will improve gross motor coordination to increase ability to participate in all ADLs and functional activities within 4 weeks.

## 2018-03-12 NOTE — SWALLOW BEDSIDE ASSESSMENT ADULT - SWALLOW EVAL: ORAL MUSCULATURE
V1-V3 intact, face symmetrical, tongue and uvula at midline, + b/l strong shoulder shrug, neck ROM and strength WFL/generally intact

## 2018-03-12 NOTE — OCCUPATIONAL THERAPY INITIAL EVALUATION ADULT - LIVES WITH, PROFILE
lives in alone in a co-op apartment, +elevator, +bath tub, previously independent with all ADLs and ambulation, very active lifestyle

## 2018-03-12 NOTE — SWALLOW VFSS/MBS ASSESSMENT ADULT - ADDITIONAL INFORMATION
C-spine changes, with small anterior cervical osteophytes. There was evidence of calcification of the cricoid cartilage and arytenoids C-spine changes, with small anterior cervical osteophytes. There was evidence of calcification of the cricoid cartilage and arytenoids.     Disorders: delay in trigger of the swallow reflex with low trigger points, inconsistent mildly reduced anterior hyoid excursion and reduced laryngeal elevation, small CP bar    Strategies: chin tuck and intake of small single bites and sips improved a/w protection for thin liquids/mixed textures.

## 2018-03-12 NOTE — OCCUPATIONAL THERAPY INITIAL EVALUATION ADULT - BALANCE TRAINING, PT EVAL
Pt will improve dynamic standing balance to good to increase ability to participate in ADLs and functional activities within 4 weeks.

## 2018-03-12 NOTE — SWALLOW BEDSIDE ASSESSMENT ADULT - ASR SWALLOW ASPIRATION MONITOR
change of breathing pattern/fever/pneumonia/upper respiratory infection/Monitor for s/s aspiration/laryngeal penetration. If noted:  D/C p.o. intake, provide non-oral nutrition/hydration/meds, and contact this service @ [phone]/erick voice/cough/throat clearing

## 2018-03-12 NOTE — OCCUPATIONAL THERAPY INITIAL EVALUATION ADULT - PERTINENT HX OF CURRENT PROBLEM, REHAB EVAL
63 y/o f w/ PMH of meningioma s/p craniotomy, HLD, depression p/w symptoms of numbness and tingling in hands and feet for the last couple of weeks, complicated by weakness, unsteadiness, and difficulty walking.  Pt initially had upper respiratory symptoms while in [place], and was diagnosed at a walk-in-clinic with a walking pneumonia, for which she was started on doxycycline.  Pt states that the symptoms of numbness and tingling started shortly after then.

## 2018-03-12 NOTE — SWALLOW VFSS/MBS ASSESSMENT ADULT - ORAL PHASE
within functional limits Uncontrolled bolus / spillover in braden-pharynx/Incomplete tongue to palate contact Within functional limits

## 2018-03-12 NOTE — SWALLOW VFSS/MBS ASSESSMENT ADULT - CONSISTENCIES ADMINISTERED
puree thin puree thick honey thick nectar thick hard solid/mech soft thin liquid mixed consistency (fruit cocktail)

## 2018-03-12 NOTE — SWALLOW BEDSIDE ASSESSMENT ADULT - SWALLOW EVAL: DIAGNOSIS
Patient presents with evidence of pharyngeal dysphagia with s/s suggestive of laryngeal penetration/aspiration on thin liquids

## 2018-03-12 NOTE — OCCUPATIONAL THERAPY INITIAL EVALUATION ADULT - PLANNED THERAPY INTERVENTIONS, OT EVAL
transfer training/fine motor coordination training/motor coordination training/ADL retraining/strengthening/balance training

## 2018-03-12 NOTE — OCCUPATIONAL THERAPY INITIAL EVALUATION ADULT - STRENGTHENING, PT EVAL
Pt will improve BUE strength to 5/5 to increase ability to participate in functional activities and ADLs within 4 weeks.

## 2018-03-13 DIAGNOSIS — R50.9 FEVER, UNSPECIFIED: ICD-10-CM

## 2018-03-13 LAB
APPEARANCE UR: CLEAR — SIGNIFICANT CHANGE UP
B BURGDOR DNA SPEC QL NAA+PROBE: NEGATIVE — SIGNIFICANT CHANGE UP
BILIRUB UR-MCNC: NEGATIVE — SIGNIFICANT CHANGE UP
COLOR SPEC: YELLOW — SIGNIFICANT CHANGE UP
DIFF PNL FLD: NEGATIVE — SIGNIFICANT CHANGE UP
GLUCOSE UR QL: NEGATIVE MG/DL — SIGNIFICANT CHANGE UP
HCT VFR BLD CALC: 37.8 % — SIGNIFICANT CHANGE UP (ref 34.5–45)
HGB BLD-MCNC: 12.8 G/DL — SIGNIFICANT CHANGE UP (ref 11.5–15.5)
KETONES UR-MCNC: NEGATIVE — SIGNIFICANT CHANGE UP
LEUKOCYTE ESTERASE UR-ACNC: NEGATIVE — SIGNIFICANT CHANGE UP
MCHC RBC-ENTMCNC: 31.4 PG — SIGNIFICANT CHANGE UP (ref 27–34)
MCHC RBC-ENTMCNC: 33.9 GM/DL — SIGNIFICANT CHANGE UP (ref 32–36)
MCV RBC AUTO: 92.6 FL — SIGNIFICANT CHANGE UP (ref 80–100)
NITRITE UR-MCNC: NEGATIVE — SIGNIFICANT CHANGE UP
NRBC # BLD: 0 /100 WBCS — SIGNIFICANT CHANGE UP (ref 0–0)
OLIGOCLONAL BANDS CSF ELPH-IMP: SIGNIFICANT CHANGE UP
OLIGOCLONAL BANDS CSF ELPH-IMP: SIGNIFICANT CHANGE UP
PH UR: 6 — SIGNIFICANT CHANGE UP (ref 5–8)
PLATELET # BLD AUTO: 220 K/UL — SIGNIFICANT CHANGE UP (ref 150–400)
PROT UR-MCNC: NEGATIVE MG/DL — SIGNIFICANT CHANGE UP
RBC # BLD: 4.08 M/UL — SIGNIFICANT CHANGE UP (ref 3.8–5.2)
RBC # FLD: 13.7 % — SIGNIFICANT CHANGE UP (ref 10.3–14.5)
SP GR SPEC: 1.01 — LOW (ref 1.01–1.02)
UROBILINOGEN FLD QL: NEGATIVE MG/DL — SIGNIFICANT CHANGE UP
WBC # BLD: 4.62 K/UL — SIGNIFICANT CHANGE UP (ref 3.8–10.5)
WBC # FLD AUTO: 4.62 K/UL — SIGNIFICANT CHANGE UP (ref 3.8–10.5)

## 2018-03-13 PROCEDURE — 99253 IP/OBS CNSLTJ NEW/EST LOW 45: CPT

## 2018-03-13 PROCEDURE — 71045 X-RAY EXAM CHEST 1 VIEW: CPT | Mod: 26

## 2018-03-13 PROCEDURE — 99233 SBSQ HOSP IP/OBS HIGH 50: CPT

## 2018-03-13 PROCEDURE — 99231 SBSQ HOSP IP/OBS SF/LOW 25: CPT

## 2018-03-13 RX ORDER — SODIUM CHLORIDE 9 MG/ML
500 INJECTION INTRAMUSCULAR; INTRAVENOUS; SUBCUTANEOUS ONCE
Qty: 0 | Refills: 0 | Status: COMPLETED | OUTPATIENT
Start: 2018-03-13 | End: 2018-03-13

## 2018-03-13 RX ADMIN — SODIUM CHLORIDE 2000 MILLILITER(S): 9 INJECTION INTRAMUSCULAR; INTRAVENOUS; SUBCUTANEOUS at 20:38

## 2018-03-13 RX ADMIN — Medication 50 MILLIGRAM(S): at 22:18

## 2018-03-13 RX ADMIN — Medication 25 MILLIGRAM(S): at 12:16

## 2018-03-13 RX ADMIN — Medication 25 MILLIGRAM(S): at 22:07

## 2018-03-13 RX ADMIN — Medication 650 MILLIGRAM(S): at 11:09

## 2018-03-13 RX ADMIN — Medication 31.25 GRAM(S): at 22:40

## 2018-03-13 RX ADMIN — Medication 2000 UNIT(S): at 18:07

## 2018-03-13 RX ADMIN — CITALOPRAM 10 MILLIGRAM(S): 10 TABLET, FILM COATED ORAL at 12:16

## 2018-03-13 RX ADMIN — Medication 650 MILLIGRAM(S): at 11:39

## 2018-03-13 RX ADMIN — BUPROPION HYDROCHLORIDE 150 MILLIGRAM(S): 150 TABLET, EXTENDED RELEASE ORAL at 12:16

## 2018-03-13 RX ADMIN — SIMVASTATIN 10 MILLIGRAM(S): 20 TABLET, FILM COATED ORAL at 22:08

## 2018-03-13 RX ADMIN — Medication 325 MILLIGRAM(S): at 22:08

## 2018-03-13 NOTE — PROGRESS NOTE ADULT - PROBLEM SELECTOR PLAN 1
- Pt noted to be febrile overnight.  This may be 2/2 to IVIg infusion rather than infectious cause.  However given that pt has been in the hospital for a few days would recommend to fever workup including CBC, UA, CXR, and blood cultures. - Pt noted to be febrile overnight.  This may be 2/2 to IVIg infusion rather than infectious cause.  However given that pt has been in the hospital for a few days would recommend fever workup including CBC, UA, CXR, and blood cultures.

## 2018-03-13 NOTE — PROGRESS NOTE ADULT - PROBLEM SELECTOR PLAN 5
c/w home simvastatin
c/w home simvastatin
Recent MRI w/o evidence of recurrence (12/2017)
c/w home bupropion and citalopram
c/w home simvastatin

## 2018-03-13 NOTE — CONSULT NOTE ADULT - SUBJECTIVE AND OBJECTIVE BOX
Cc: Difficulty walking    HPI:  61 y/o f w/ PMH of meningioma s/p craniotomy, HLD, depression p/w symptoms of numbness and tingling in hands and feet for the last couple of weeks, complicated by weakness, unsteadiness, and difficulty walking.  Pt initially had upper respiratory symptoms while in [place], and was diagnosed at a walk-in-clinic with a walking pneumonia, for which she was started on doxycycline.  Pt states that the symptoms of numbness and tingling started shortly after then.  They were initially localized ot her hands and feet, but eventually included her abdomen.  She did not have any n/v, diarrhea.  She developed weakness and had more difficulty with ambulation, and tingling and numbness had gotten worse.  She went ot he her neurosurgeon, who found on reflex exam, that reflexes that are usually brisk for her were diminished.  She had an outpt MRI of cervical spine that showed multiple areas of stenoses.    Patient was admitted with diagnosis of GBS and has received IVIG.    REVIEW OF SYSTEMS: No chest pain, shortness of breath, nausea, vomiting or diarhea.      PAST MEDICAL & SURGICAL HISTORY  Depression  Hyperlipidemia  Meningioma  History of craniotomy      SOCIAL HISTORY  Smoking - Denied, EtOH - Denied, Drugs - Denied    FUNCTIONAL HISTORY:   Lives   Independent    CURRENT FUNCTIONAL STATUS:  Ambulating supervision    FAMILY HISTORY   Family history of cardiovascular disease (Father, Mother)  Family history of prostate cancer in father (Father)  Family history of spinal stenosis (Father)      RECENT LABS/IMAGING              VITALS  T(C): 37.4 (03-13-18 @ 11:05), Max: 38.2 (03-13-18 @ 08:45)  HR: 73 (03-13-18 @ 11:05) (68 - 81)  BP: 115/74 (03-13-18 @ 11:05) (93/58 - 128/75)  RR: 18 (03-13-18 @ 11:05) (18 - 18)  SpO2: 94% (03-13-18 @ 11:05) (94% - 96%)  Wt(kg): --    ALLERGIES  No Known Allergies      MEDICATIONS   acetaminophen   Tablet 650 milliGRAM(s) Oral every 6 hours PRN  acetaminophen   Tablet 325 milliGRAM(s) Oral daily  acetaminophen   Tablet. 650 milliGRAM(s) Oral every 6 hours PRN  acetaminophen   Tablet. 325 milliGRAM(s) Oral daily  buPROPion XL . 150 milliGRAM(s) Oral daily  cholecalciferol 2000 Unit(s) Oral every 24 hours  citalopram 10 milliGRAM(s) Oral daily  diphenhydrAMINE   Injectable 25 milliGRAM(s) IV Push daily  heparin  Injectable 5000 Unit(s) SubCutaneous every 8 hours  immune globulin gamma IVPB 25 Gram(s) IV Intermittent daily  Ospemifene (Osphena) 60 milliGRAM(s) 60 milliGRAM(s) Oral daily  pregabalin 25 milliGRAM(s) Oral daily  pregabalin 50 milliGRAM(s) Oral at bedtime  simvastatin 10 milliGRAM(s) Oral at bedtime  sodium chloride 0.9%. 1000 milliLiter(s) IV Continuous <Continuous>  sodium chloride 0.9%. 1000 milliLiter(s) IV Continuous <Continuous>      ----------------------------------------------------------------------------------------  PHYSICAL EXAM  Constitutional - NAD, Comfortable  HEENT - NCAT, EOMI  Neck - Supple, No limited ROM  Chest - CTA bilaterally, No wheeze, No rhonchi, No crackles  Cardiovascular - RRR, S1S2, No murmurs  Abdomen - BS+, Soft, NTND  Extremities - No C/C/E, No calf tenderness   Neurologic Exam -                    Cognitive - Awake, Alert, AAO to self, place, date, year, situation     Communication - Fluent, No dysarthria, no aphasia     Cranial Nerves - CN 2-12 intact     Motor - No focal deficits                       Sensory - Intact to LT     Reflexes - DTR Intact, No primitive reflexive     Balance - WNL Static  Psychiatric - Mood stable, Affect WNL      Impression:    yo with CVA with      Plan:  PT- ROM, Bed Mob, Transfers, Amb w AD and bracing as needed  OT- ADLs, bracing  SLP- Dysphagia eval and treat  Prec- Falls, Cardiac  DVT Prophylaxis  Skin- Turn q2 h  Dispo- Cc: Difficulty walking    HPI:  61 y/o f w/ PMH of meningioma s/p craniotomy, HLD, depression p/w symptoms of numbness and tingling in hands and feet for the last couple of weeks, complicated by weakness, unsteadiness, and difficulty walking.  Pt initially had upper respiratory symptoms while in [place], and was diagnosed at a walk-in-clinic with a walking pneumonia, for which she was started on doxycycline.  Pt states that the symptoms of numbness and tingling started shortly after then.  They were initially localized ot her hands and feet, but eventually included her abdomen.  She did not have any n/v, diarrhea.  She developed weakness and had more difficulty with ambulation, and tingling and numbness had gotten worse.  She went ot he her neurosurgeon, who found on reflex exam, that reflexes that are usually brisk for her were diminished.  She had an outpt MRI of cervical spine that showed multiple areas of stenoses.    Patient was admitted with diagnosis of GBS and has received IVIG.    REVIEW OF SYSTEMS: Numbness of the feet, dyesthesias, poor balance, diffuse weakness, No chest pain, shortness of breath, nausea, vomiting or diarhea.      PAST MEDICAL & SURGICAL HISTORY  Depression  Hyperlipidemia  Meningioma  History of craniotomy    SOCIAL HISTORY  Smoking - Denied, EtOH - Denied, Drugs - Denied    FUNCTIONAL HISTORY:   Lives alone in [facility] with elevator access. Works as a nurse.  PTA Independent with ambulation and ADLs    CURRENT FUNCTIONAL STATUS:  Ambulating CG    FAMILY HISTORY   Family history of cardiovascular disease (Father, Mother)  Family history of prostate cancer in father (Father)  Family history of spinal stenosis (Father)    VITALS  T(C): 37.4 (03-13-18 @ 11:05), Max: 38.2 (03-13-18 @ 08:45)  HR: 73 (03-13-18 @ 11:05) (68 - 81)  BP: 115/74 (03-13-18 @ 11:05) (93/58 - 128/75)  RR: 18 (03-13-18 @ 11:05) (18 - 18)  SpO2: 94% (03-13-18 @ 11:05) (94% - 96%)  Wt(kg): --    ALLERGIES  No Known Allergies    MEDICATIONS   acetaminophen   Tablet 650 milliGRAM(s) Oral every 6 hours PRN  acetaminophen   Tablet 325 milliGRAM(s) Oral daily  acetaminophen   Tablet. 650 milliGRAM(s) Oral every 6 hours PRN  acetaminophen   Tablet. 325 milliGRAM(s) Oral daily  buPROPion XL . 150 milliGRAM(s) Oral daily  cholecalciferol 2000 Unit(s) Oral every 24 hours  citalopram 10 milliGRAM(s) Oral daily  diphenhydrAMINE   Injectable 25 milliGRAM(s) IV Push daily  heparin  Injectable 5000 Unit(s) SubCutaneous every 8 hours  immune globulin gamma IVPB 25 Gram(s) IV Intermittent daily  Ospemifene (Osphena) 60 milliGRAM(s) 60 milliGRAM(s) Oral daily  pregabalin 25 milliGRAM(s) Oral daily  pregabalin 50 milliGRAM(s) Oral at bedtime  simvastatin 10 milliGRAM(s) Oral at bedtime  sodium chloride 0.9%. 1000 milliLiter(s) IV Continuous <Continuous>  sodium chloride 0.9%. 1000 milliLiter(s) IV Continuous <Continuous>      ----------------------------------------------------------------------------------------  PHYSICAL EXAM  Constitutional - NAD, Comfortable  HEENT - NCAT, EOMI  Neck - Supple, No limited ROM  Chest - CTA bilaterally, No wheeze, No rhonchi, No crackles  Cardiovascular - RRR, S1S2, No murmurs  Abdomen - BS+, Soft, NTND  Extremities - No C/C/E, No calf tenderness   Neurologic Exam -                    Cognitive - Awake, Alert, AAO to self, place, date, year, situation     Communication - Fluent, No dysarthria, no aphasia      Motor - No focal deficits                  Sensory - Diminished distal LEs, poor proprioception     Reflexes - DTRs 1+     Sit to Stand Supervision; amb w/o AD CG with ataxic gait  Psychiatric - Affect WNL      Impression:  63 yo with gait dysfunction secondary to GBS      Plan:  PT- ROM, Bed Mob, Transfers, Amb w AD  OT- ADLs  Prec- Falls  DVT Prophylaxis- Heparin SQ  Dyesthesias- Lyrica  Skin- Turn q2 h  Dispo- Acute Rehab- can tolerate 3h/d PT/OT/SLP and requires daily physician visits

## 2018-03-13 NOTE — PROGRESS NOTE ADULT - SUBJECTIVE AND OBJECTIVE BOX
Patient is a 62y old  Female who presents with a chief complaint of numbness (11 Mar 2018 15:45)      SUBJECTIVE / OVERNIGHT EVENTS: Pt tolerated the IVIg yesterday.  Still reports having some difficulty walking and paresthesias.  Pt noted to have low grade fever to 100.8.  Denies any cough, chills, or urinary symptoms.    12 point review of systems otherwise negative    MEDICATIONS  (STANDING):  acetaminophen   Tablet 325 milliGRAM(s) Oral daily  acetaminophen   Tablet. 325 milliGRAM(s) Oral daily  buPROPion XL . 150 milliGRAM(s) Oral daily  cholecalciferol 2000 Unit(s) Oral every 24 hours  citalopram 10 milliGRAM(s) Oral daily  diphenhydrAMINE   Injectable 25 milliGRAM(s) IV Push daily  heparin  Injectable 5000 Unit(s) SubCutaneous every 8 hours  immune globulin gamma IVPB 25 Gram(s) IV Intermittent daily  Ospemifene (Osphena) 60 milliGRAM(s) 60 milliGRAM(s) Oral daily  pregabalin 25 milliGRAM(s) Oral daily  pregabalin 50 milliGRAM(s) Oral at bedtime  simvastatin 10 milliGRAM(s) Oral at bedtime  sodium chloride 0.9%. 1000 milliLiter(s) (65 mL/Hr) IV Continuous <Continuous>  sodium chloride 0.9%. 1000 milliLiter(s) (65 mL/Hr) IV Continuous <Continuous>    MEDICATIONS  (PRN):  acetaminophen   Tablet 650 milliGRAM(s) Oral every 6 hours PRN For Temp greater than 38 C (100.4 F)  acetaminophen   Tablet. 650 milliGRAM(s) Oral every 6 hours PRN Mild Pain (1 - 3)      Vital Signs Last 24 Hrs  T(C): 38.2 (13 Mar 2018 08:45), Max: 38.2 (13 Mar 2018 08:45)  T(F): 100.8 (13 Mar 2018 08:45), Max: 100.8 (13 Mar 2018 08:45)  HR: 79 (13 Mar 2018 08:45) (68 - 81)  BP: 109/73 (13 Mar 2018 08:45) (93/58 - 128/75)  BP(mean): --  RR: 18 (13 Mar 2018 08:45) (18 - 18)  SpO2: 94% (13 Mar 2018 08:45) (94% - 96%)  CAPILLARY BLOOD GLUCOSE        I&O's Summary    12 Mar 2018 07:01  -  13 Mar 2018 07:00  --------------------------------------------------------  IN: 2010 mL / OUT: 0 mL / NET: 2010 mL        PHYSICAL EXAM:  GENERAL: NAD, well-developed  HEAD:  Atraumatic, Normocephalic  EYES: EOMI, PERRL, conjunctiva and sclera clear  NECK: Supple, No JVD  CHEST/LUNG: Clear to auscultation bilaterally; No wheeze  HEART: Regular rate and rhythm; No murmurs, rubs, or gallops  ABDOMEN: Soft, Nontender, Nondistended; Bowel sounds present  EXTREMITIES:  2+ Peripheral Pulses, No clubbing, cyanosis, or edema  PSYCH: AAOx3  NEUROLOGY: non-focal  SKIN: No rashes or lesions    LABS:                    RADIOLOGY & ADDITIONAL TESTS:    Imaging Personally Reviewed:    Consultant(s) Notes Reviewed:  Neurology    Care Discussed with Consultants/Other Providers:

## 2018-03-13 NOTE — PROGRESS NOTE ADULT - PROBLEM SELECTOR PLAN 6
Recent MRI w/o evidence of recurrence
Recent MRI w/o evidence of recurrence (12/2017)
DVT PPX: Holding heparin sq pending LP
Recent MRI w/o evidence of recurrence (12/2017)
c/w home simvastatin

## 2018-03-13 NOTE — PROGRESS NOTE ADULT - ASSESSMENT
63 yo woman who presented with decreased relfexes, paresthesias of the hands and feet and mild weakness c/w with AIDP. Having been treated with 5 day course of IVIG, today is the last dose. She has clinically improved. She is medically cleared for discharge.       Dispo: acute rehab.     Continue to monitor.   c/w Lyrica  continue home meds  DVT ppx.  Pt to follow up with Dr. Lopez as outpatient. 63 yo woman who presented with decreased relfexes, paresthesias of the hands and feet and mild weakness c/w with AIDP. Having been treated with 5 day course of IVIG, today is the last dose. She has clinically improved. She is medically cleared for discharge.       Dispo: acute rehab.     Low grade fever overnight: Will do routine infectious w/o - urinalysis, CXR, cbc, blood cultures    Continue to monitor.   c/w Lyrica  continue home meds  DVT ppx.  Pt to follow up with Dr. Lopez as outpatient.

## 2018-03-13 NOTE — PROGRESS NOTE ADULT - PROBLEM SELECTOR PLAN 1
given now diffuse hyporeflexia and albuminocytologic dissociation. Abnormal MRI findings not concerning for cord compression.    - c/w IVIG 3+4th dose of 5.   - no airway compromise, will decrease NIF and VC to qd   - lyrica for painful dysethesias as needed  - PT/OT: acute rehab.

## 2018-03-13 NOTE — PROGRESS NOTE ADULT - PROBLEM SELECTOR PLAN 3
Pt with cervical myelopathy and T3-4 disc deformity  S/p Decadron  Appreciate otho recs no acute surgical intervention at this time  PT/OT/OOB to chair
Thyroid U/S reviewed; notable for 1 cm hypoechoic nodule.  TSH is WNL.  Will need further workup with FNA biopsy  Defer for now until GBS workup complete
c/w home bupropion and citalopram

## 2018-03-13 NOTE — PROGRESS NOTE ADULT - PROBLEM SELECTOR PROBLEM 2
Cervical myelopathy
R/O GBS (Guillain [place] syndrome)
Cervical myelopathy
Cervical myelopathy
Thyroid nodule

## 2018-03-13 NOTE — PROGRESS NOTE ADULT - PROBLEM SELECTOR PLAN 4
c/w home simvastatin
Thyroid U/S reviewed; notable for 1 cm hypoechoic nodule.  TSH is WNL.  Will need further workup with FNA biopsy  Defer for now until GBS workup complete
c/w home bupropion and citalopram

## 2018-03-13 NOTE — PROGRESS NOTE ADULT - PROBLEM SELECTOR PROBLEM 7
Need for prophylactic measure
Need for prophylactic measure
Meningioma
Need for prophylactic measure

## 2018-03-13 NOTE — PROGRESS NOTE ADULT - PROBLEM SELECTOR PLAN 2
DDX includes gbs vs aidp vs cervical/thoracic myelopathy  Pt s/p LP, results noted  Pt on empiric course of IVIG    NIF and VC qd  Lyrica dose increased, will continue with current regimen   PT eval recommending home PT, pt would like to go to rehab, PT re-eval pending   Neuro recs appreciated  Consider checking B12, and folate level in the setting of ataxia and paresthesias
Pt with cervical myelopathy and T3-4 disc deformity  C/w decadron  Appreciate otho recs no acute surgical intervention at this time  PT/OT/OOB to chair
Pt with cervical myelopathy and T3-4 disc deformity  C/w decadron  Appreciate otho recs no acute surgical intervention at this time  PT/OT/OOB to chair
Pt with cervical myelopathy and T3-4 disc deformity  S/p Decadron  Appreciate otho recs no acute surgical intervention at this time  PT/OT/OOB to chair
Thyroid U/S reviewed; notable for 1 cm hypoechoic nodule.  TSH is WNL.  - Will need further workup with FNA biopsy  - Defer for now until GBS workup complete, ?inpatient vs. outpatient further workup

## 2018-03-13 NOTE — PROGRESS NOTE ADULT - SUBJECTIVE AND OBJECTIVE BOX
Neurology Follow up note    Name: ROSETTE GONZALEZ    Subjective: Doing well this morning. Having mild back pain.      HPI: 61 yo woman who presents to [facility] on referral by her private neurologist, Dr. Barrios, w/ 1 day onset of gait instability w/ b/l LE hypo-reflexia. This was preceeded by an URI (2/12/18) while traveling in [facility], treated w/ doxycyline, followed by b/l peripheral neuropathy (tingling) in finger and toes (2/16/18), followed by numbness in the abdominal region (3/5/18). Patient's symptoms refractory to meloxicam and lyrica, which was started 2 days ago. Pertinent hx includes meningioma s/p craniotomy (1993) w/ regular MRI every other year, cervical stenosis (seen on MRI c-spine). Patient noted that while on her trip in [place], she did go skiing w/ multiple subsequent falls however does not recall any particular falls resulting in any significant trauma.    PMH/PSH:   HLD  meningioma s/p craniotomy w/ residual right patellar hyper-reflexia   MDD    post-menopausal     MEDICATIONS  (STANDING):  acetaminophen   Tablet 325 milliGRAM(s) Oral daily  acetaminophen   Tablet. 325 milliGRAM(s) Oral daily  buPROPion XL . 150 milliGRAM(s) Oral daily  cholecalciferol 2000 Unit(s) Oral every 24 hours  citalopram 10 milliGRAM(s) Oral daily  diphenhydrAMINE   Injectable 25 milliGRAM(s) IV Push daily  heparin  Injectable 5000 Unit(s) SubCutaneous every 8 hours  immune globulin gamma IVPB 25 Gram(s) IV Intermittent daily  Ospemifene (Osphena) 60 milliGRAM(s) 60 milliGRAM(s) Oral daily  pregabalin 25 milliGRAM(s) Oral daily  pregabalin 50 milliGRAM(s) Oral at bedtime  simvastatin 10 milliGRAM(s) Oral at bedtime  sodium chloride 0.9%. 1000 milliLiter(s) (65 mL/Hr) IV Continuous <Continuous>  sodium chloride 0.9%. 1000 milliLiter(s) (65 mL/Hr) IV Continuous <Continuous>    MEDICATIONS  (PRN):  acetaminophen   Tablet 650 milliGRAM(s) Oral every 6 hours PRN For Temp greater than 38 C (100.4 F)  acetaminophen   Tablet. 650 milliGRAM(s) Oral every 6 hours PRN Mild Pain (1 - 3)    Allergies: No Known Allergies    Objective:   Vital Signs Last 24 Hrs  T(C): 37.4 (13 Mar 2018 11:05), Max: 38.2 (13 Mar 2018 08:45)  T(F): 99.3 (13 Mar 2018 11:05), Max: 100.8 (13 Mar 2018 08:45)  HR: 73 (13 Mar 2018 11:05) (68 - 81)  BP: 115/74 (13 Mar 2018 11:05) (93/58 - 128/75)  BP(mean): --  RR: 18 (13 Mar 2018 11:05) (18 - 18)  SpO2: 94% (13 Mar 2018 11:05) (94% - 96%)    GENERAL EXAM: No acute distress      breathing appears completely comfortable, normal rate, no dyspnea           NEUROLOGICAL EXAM:  Mental Status: AAOx3, fluent speech, follows simple commands, able to name, coherent, alert  Motor: 5/5 b/l deltoid/tricep/bicep/handgrip/wrist extension and 4/5 flexion/FF,  4+/5 left dorsiflexion, HF, Ham, rest 5/5,   Sensation: decreased proprioception b/l toes, sensory level in abdominal region only in area of T5-T10 approx to PP/LT   Coordination: FTN intact b/l, unsteady gait, on functional testing patient unable to squat, unable to tandem.   Reflexes: 1+ R/L brachioradialis, rest arreflexic, plantars mute     03-11    137  |  105  |  20  ----------------------------<  136<H>  4.2   |  18<L>  |  0.72    Ca    9.0      11 Mar 2018 07:04  Phos  3.3     03-11  Mg     2.0     03-11    Radiology    < from: MR Lumbar Spine No Cont (03.09.18 @ 22:26) >  At T3-4 there a disc deformity and ventral cord displacement, a ventral   cord herniation is not excluded, axial T2 series 7 image 4, sagittal   series 3 image 8.    Multilevel degenerative changes  in the cervical and lumbar spine as   detailed above without  severe canal compromise or cord compression.    Questionable areas of slight high T2 signal in the cervical cord may   reflect motion, suggest repeat imaging when patient can hold still with   dimensions region.    < end of copied text >     < from: MR Head No Cont (03.09.18 @ 22:26) >  IMPRESSION:     Nonspecific hyperintense T2 signal in the white matter likely   microvascular disease without evidence for any restricted diffusion,   hemorrhage or midline shift.    < end of copied text >

## 2018-03-14 PROCEDURE — 99231 SBSQ HOSP IP/OBS SF/LOW 25: CPT

## 2018-03-14 RX ORDER — SODIUM CHLORIDE 9 MG/ML
250 INJECTION INTRAMUSCULAR; INTRAVENOUS; SUBCUTANEOUS
Qty: 0 | Refills: 0 | Status: DISCONTINUED | OUTPATIENT
Start: 2018-03-14 | End: 2018-03-15

## 2018-03-14 RX ADMIN — SIMVASTATIN 10 MILLIGRAM(S): 20 TABLET, FILM COATED ORAL at 22:00

## 2018-03-14 RX ADMIN — Medication 50 MILLIGRAM(S): at 22:00

## 2018-03-14 RX ADMIN — Medication 2000 UNIT(S): at 13:18

## 2018-03-14 RX ADMIN — CITALOPRAM 10 MILLIGRAM(S): 10 TABLET, FILM COATED ORAL at 13:17

## 2018-03-14 RX ADMIN — Medication 25 MILLIGRAM(S): at 13:18

## 2018-03-14 RX ADMIN — BUPROPION HYDROCHLORIDE 150 MILLIGRAM(S): 150 TABLET, EXTENDED RELEASE ORAL at 13:17

## 2018-03-14 RX ADMIN — SODIUM CHLORIDE 62.5 MILLILITER(S): 9 INJECTION INTRAMUSCULAR; INTRAVENOUS; SUBCUTANEOUS at 03:00

## 2018-03-14 NOTE — DIETITIAN INITIAL EVALUATION ADULT. - ORAL INTAKE PTA
good/Pt reports usual good appetite and po intake. Pt states she eats small frequent meals throughout the day. NKFA. Pt takes vit D at home.

## 2018-03-14 NOTE — DIETITIAN INITIAL EVALUATION ADULT. - NS AS NUTRI INTERV MEALS SNACK
Continue with low fat, regular diet as ordered. Continue to encourage good po intake and honor food preference requests as needed. Continue regular diet as medically feasible. RD remains available to obtain food preferences.

## 2018-03-14 NOTE — PROGRESS NOTE ADULT - PROBLEM SELECTOR PLAN 1
given now diffuse hyporeflexia and albuminocytologic dissociation. Abnormal MRI findings not concerning for cord compression.    - completed IVIG 5 day course.   - no airway compromise, will decrease NIF and VC to qd   - lyrica for painful dysethesias as needed  - PT/OT: acute rehab.  - DVT prophylaxis   - f/u outpatient w/ Dr. Lopez and/or private neurologist, Dr. Barrios. given now diffuse hyporeflexia and albuminocytologic dissociation. Abnormal MRI findings not concerning for cord compression.    - completed IVIG 5 day course.   - no airway compromise, will decrease NIF and VC to qd   - lyrica for painful dysethesias as needed  - PT/OT: acute rehab.  - DVT prophylaxis   - f/u outpatient w/ Dr. Lopez for long term management

## 2018-03-14 NOTE — DIETITIAN INITIAL EVALUATION ADULT. - ENERGY NEEDS
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 PMH: depressing, HLD, meningioma. Skin: WDL, no edema noted per flowsheet, no pressure ulcers noted at this time. 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 PMH: depression, HLD, meningioma. Skin: WDL, no edema noted per flowsheet, no pressure ulcers noted at this time.

## 2018-03-14 NOTE — DIETITIAN INITIAL EVALUATION ADULT. - OTHER INFO
61 YO female seen for LOS on [facility]. Pt reports good appetite and po intake ~75%. Pt states that she likes to eat small frequent meals, so she will often save tray items for snack later in the day. Pt denies any recent weight change with UBW ~150 pounds. Current admission weight is 149 pounds. Pt denies N+V. Pt states that she is prone to constipation, even PTA. Pt is receiving stewed prunes in house, reports this is working. Per pt report, last +BM yesterday. No chewing or swallowing difficulties. 61 YO female seen for length of stay on [facility]. S/P speech and swallow evaluation on 3/11: recommended for dysphagia 3 (no liquids until MBS); S/P MBS on 3/12: recommended for regular diet, thin liquids. Pt reports good appetite and po intake ~75%. Pt states that she likes to eat small frequent meals, so she will often save tray items for snack later in the day. Pt denies any recent weight change with UBW ~150 pounds. Current admission weight is 149 pounds. Pt denies N+V. Pt states that she is prone to constipation, even PTA. Pt is receiving stewed prunes in house, reports this is working. Per pt report, last +BM yesterday.

## 2018-03-14 NOTE — PROVIDER CONTACT NOTE (OTHER) - ACTION/TREATMENT ORDERED:
"continue with EMAR order to increase rate by 10cc" per Dr. Orr
provider to come assess. see orders to follow
"continue with orders on EMAR to increase rate of IV immune globulin by 10cc" per provider Saleem Orr.
"stop IV immune globulin infusion and orders to follow" per provider Saleem Orr.
IV RN placed second IV line right hand and NS bolus 250 ml to be administered.
MD Rocio Way notified
No further orders at this time.
No further orders at this time.

## 2018-03-14 NOTE — PROGRESS NOTE ADULT - SUBJECTIVE AND OBJECTIVE BOX
Neurology Follow up note    Name: ANGELIQUE GONZALEZ    Subjective: Doing well this morning. Having mild back pain. Low grade fever overnight: Will do routine infectious w/o - urinalysis, CXR, cbc, blood cultures    HPI: 61 yo woman who presents to [facility] on referral by her private neurologist, Dr. Barrios, w/ 1 day onset of gait instability w/ b/l LE hypo-reflexia. This was preceeded by an URI (2/12/18) while traveling in [place], treated w/ doxycyline, followed by b/l peripheral neuropathy (tingling) in finger and toes (2/16/18), followed by numbness in the abdominal region (3/5/18). Patient's symptoms refractory to meloxicam and lyrica, which was started 2 days ago. Pertinent hx includes meningioma s/p craniotomy (1993) w/ regular MRI every other year, cervical stenosis (seen on MRI c-spine). Patient noted that while on her trip in [place], she did go skiing w/ multiple subsequent falls however does not recall any particular falls resulting in any significant trauma.    PMH/PSH:   HLD  meningioma s/p craniotomy w/ residual right patellar hyper-reflexia   MDD    post-menopausal     MEDICATIONS  (STANDING):  acetaminophen   Tablet 325 milliGRAM(s) Oral daily  acetaminophen   Tablet. 325 milliGRAM(s) Oral daily  buPROPion XL . 150 milliGRAM(s) Oral daily  cholecalciferol 2000 Unit(s) Oral every 24 hours  citalopram 10 milliGRAM(s) Oral daily  heparin  Injectable 5000 Unit(s) SubCutaneous every 8 hours  Ospemifene (Osphena) 60 milliGRAM(s) 60 milliGRAM(s) Oral daily  pregabalin 25 milliGRAM(s) Oral daily  pregabalin 50 milliGRAM(s) Oral at bedtime  simvastatin 10 milliGRAM(s) Oral at bedtime  sodium chloride 0.9%. 250 milliLiter(s) (62.5 mL/Hr) IV Continuous <Continuous>  sodium chloride 0.9%. 1000 milliLiter(s) (65 mL/Hr) IV Continuous <Continuous>  sodium chloride 0.9%. 1000 milliLiter(s) (65 mL/Hr) IV Continuous <Continuous>    MEDICATIONS  (PRN):  acetaminophen   Tablet 650 milliGRAM(s) Oral every 6 hours PRN For Temp greater than 38 C (100.4 F)  acetaminophen   Tablet. 650 milliGRAM(s) Oral every 6 hours PRN Mild Pain (1 - 3)    Allergies: No Known Allergies    Objective:   Vital Signs Last 24 Hrs  T(C): 37.1 (14 Mar 2018 05:21), Max: 38.2 (13 Mar 2018 08:45)  T(F): 98.8 (14 Mar 2018 05:21), Max: 100.8 (13 Mar 2018 08:45)  HR: 67 (14 Mar 2018 05:21) (63 - 79)  BP: 107/68 (14 Mar 2018 05:21) (90/59 - 117/75)  RR: 18 (14 Mar 2018 05:21) (18 - 18)  SpO2: 96% (14 Mar 2018 05:21) (92% - 96%)    GENERAL EXAM: No acute distress      breathing appears completely comfortable, normal rate, no dyspnea           NEUROLOGICAL EXAM:  Mental Status: AAOx3, fluent speech, follows simple commands, able to name, coherent, alert  Motor: 5/5 b/l deltoid/tricep/bicep/handgrip/wrist extension and 4/5 flexion/FF,  4+/5 left dorsiflexion, HF, Ham, rest 5/5,   Sensation: decreased proprioception b/l toes, sensory level in abdominal region only in area of T5-T10 approx to PP/LT   Coordination: FTN intact b/l, unsteady gait, on functional testing patient unable to squat, unable to tandem.   Reflexes: 1+ R/L brachioradialis, rest arreflexic, plantars mute     Radiology    < from: MR Lumbar Spine No Cont (03.09.18 @ 22:26) >  At T3-4 there a disc deformity and ventral cord displacement, a ventral   cord herniation is not excluded, axial T2 series 7 image 4, sagittal   series 3 image 8.    Multilevel degenerative changes  in the cervical and lumbar spine as   detailed above without  severe canal compromise or cord compression.    Questionable areas of slight high T2 signal in the cervical cord may   reflect motion, suggest repeat imaging when patient can hold still with   dimensions region.    < end of copied text >     < from: MR Head No Cont (03.09.18 @ 22:26) >  IMPRESSION:     Nonspecific hyperintense T2 signal in the white matter likely   microvascular disease without evidence for any restricted diffusion,   hemorrhage or midline shift.    < end of copied text > Neurology Follow up note    Name: ANGELIQUE GONZALEZ    Subjective: No events overnight. Patient afebrile now. Infectious workup unremarkable. Patient pending discharge to acute rehab.     HPI: 63 yo woman who presents to [facility] on referral by her private neurologist, Dr. Barrios, w/ 1 day onset of gait instability w/ b/l LE hypo-reflexia. This was preceeded by an URI (2/12/18) while traveling in [facility], treated w/ doxycyline, followed by b/l peripheral neuropathy (tingling) in finger and toes (2/16/18), followed by numbness in the abdominal region (3/5/18). Patient's symptoms refractory to meloxicam and lyrica, which was started 2 days ago. Pertinent hx includes meningioma s/p craniotomy (1993) w/ regular MRI every other year, cervical stenosis (seen on MRI c-spine). Patient noted that while on her trip in [facility], she did go skiing w/ multiple subsequent falls however does not recall any particular falls resulting in any significant trauma.    PMH/PSH:   HLD  meningioma s/p craniotomy w/ residual right patellar hyper-reflexia   MDD    post-menopausal     MEDICATIONS  (STANDING):  acetaminophen   Tablet 325 milliGRAM(s) Oral daily  acetaminophen   Tablet. 325 milliGRAM(s) Oral daily  buPROPion XL . 150 milliGRAM(s) Oral daily  cholecalciferol 2000 Unit(s) Oral every 24 hours  citalopram 10 milliGRAM(s) Oral daily  heparin  Injectable 5000 Unit(s) SubCutaneous every 8 hours  Ospemifene (Osphena) 60 milliGRAM(s) 60 milliGRAM(s) Oral daily  pregabalin 25 milliGRAM(s) Oral daily  pregabalin 50 milliGRAM(s) Oral at bedtime  simvastatin 10 milliGRAM(s) Oral at bedtime  sodium chloride 0.9%. 250 milliLiter(s) (62.5 mL/Hr) IV Continuous <Continuous>  sodium chloride 0.9%. 1000 milliLiter(s) (65 mL/Hr) IV Continuous <Continuous>  sodium chloride 0.9%. 1000 milliLiter(s) (65 mL/Hr) IV Continuous <Continuous>    MEDICATIONS  (PRN):  acetaminophen   Tablet 650 milliGRAM(s) Oral every 6 hours PRN For Temp greater than 38 C (100.4 F)  acetaminophen   Tablet. 650 milliGRAM(s) Oral every 6 hours PRN Mild Pain (1 - 3)    Allergies: No Known Allergies    Objective:   Vital Signs Last 24 Hrs  T(C): 37.1 (14 Mar 2018 05:21), Max: 38.2 (13 Mar 2018 08:45)  T(F): 98.8 (14 Mar 2018 05:21), Max: 100.8 (13 Mar 2018 08:45)  HR: 67 (14 Mar 2018 05:21) (63 - 79)  BP: 107/68 (14 Mar 2018 05:21) (90/59 - 117/75)  RR: 18 (14 Mar 2018 05:21) (18 - 18)  SpO2: 96% (14 Mar 2018 05:21) (92% - 96%)    GENERAL EXAM: No acute distress      breathing appears completely comfortable, normal rate, no dyspnea           NEUROLOGICAL EXAM:  Mental Status: AAOx3, fluent speech, follows simple commands, able to name, coherent, alert  Motor: 5/5 b/l deltoid/tricep/bicep/handgrip/wrist extension and 4/5 flexion/FF,  4+/5 left dorsiflexion, HF, Ham, rest 5/5,   Sensation: decreased proprioception b/l toes, sensory level in abdominal region only in area of T5-T10 approx to PP/LT   Coordination: FTN intact b/l, unsteady gait, on functional testing patient unable to squat, unable to tandem.   Reflexes: 1+ R/L brachioradialis, rest arreflexic, plantars mute     Radiology    < from: MR Lumbar Spine No Cont (03.09.18 @ 22:26) >  At T3-4 there a disc deformity and ventral cord displacement, a ventral   cord herniation is not excluded, axial T2 series 7 image 4, sagittal   series 3 image 8.    Multilevel degenerative changes  in the cervical and lumbar spine as   detailed above without  severe canal compromise or cord compression.    Questionable areas of slight high T2 signal in the cervical cord may   reflect motion, suggest repeat imaging when patient can hold still with   dimensions region.    < end of copied text >     < from: MR Head No Cont (03.09.18 @ 22:26) >  IMPRESSION:     Nonspecific hyperintense T2 signal in the white matter likely   microvascular disease without evidence for any restricted diffusion,   hemorrhage or midline shift.    < end of copied text >

## 2018-03-14 NOTE — PROGRESS NOTE ADULT - ASSESSMENT
61 yo woman who presented with decreased relfexes, paresthesias of the hands and feet and mild weakness c/w with AIDP. Having been treated with 5 day course of IVIG, today is the last dose. She has clinically improved. She is medically cleared for discharge.

## 2018-03-15 ENCOUNTER — INPATIENT (INPATIENT)
Facility: HOSPITAL | Age: 63
LOS: 5 days | Discharge: HOME | DRG: 950 | End: 2018-03-21
Attending: PHYSICAL MEDICINE & REHABILITATION | Admitting: STUDENT IN AN ORGANIZED HEALTH CARE EDUCATION/TRAINING PROGRAM
Payer: COMMERCIAL

## 2018-03-15 VITALS
OXYGEN SATURATION: 98 % | HEART RATE: 78 BPM | SYSTOLIC BLOOD PRESSURE: 104 MMHG | DIASTOLIC BLOOD PRESSURE: 69 MMHG | TEMPERATURE: 99 F | RESPIRATION RATE: 18 BRPM

## 2018-03-15 DIAGNOSIS — M79.2 NEURALGIA AND NEURITIS, UNSPECIFIED: ICD-10-CM

## 2018-03-15 DIAGNOSIS — Z98.890 OTHER SPECIFIED POSTPROCEDURAL STATES: Chronic | ICD-10-CM

## 2018-03-15 DIAGNOSIS — F32.9 MAJOR DEPRESSIVE DISORDER, SINGLE EPISODE, UNSPECIFIED: ICD-10-CM

## 2018-03-15 DIAGNOSIS — G61.0 GUILLAIN-BARRE SYNDROME: ICD-10-CM

## 2018-03-15 DIAGNOSIS — R20.2 PARESTHESIA OF SKIN: ICD-10-CM

## 2018-03-15 DIAGNOSIS — R26.0 ATAXIC GAIT: ICD-10-CM

## 2018-03-15 DIAGNOSIS — G62.9 POLYNEUROPATHY, UNSPECIFIED: ICD-10-CM

## 2018-03-15 DIAGNOSIS — E78.5 HYPERLIPIDEMIA, UNSPECIFIED: ICD-10-CM

## 2018-03-15 DIAGNOSIS — Z51.89 ENCOUNTER FOR OTHER SPECIFIED AFTERCARE: ICD-10-CM

## 2018-03-15 PROCEDURE — 80048 BASIC METABOLIC PNL TOTAL CA: CPT

## 2018-03-15 PROCEDURE — 87070 CULTURE OTHR SPECIMN AEROBIC: CPT

## 2018-03-15 PROCEDURE — 80053 COMPREHEN METABOLIC PANEL: CPT

## 2018-03-15 PROCEDURE — 86803 HEPATITIS C AB TEST: CPT

## 2018-03-15 PROCEDURE — 93005 ELECTROCARDIOGRAM TRACING: CPT

## 2018-03-15 PROCEDURE — 87483 CNS DNA AMP PROBE TYPE 12-25: CPT

## 2018-03-15 PROCEDURE — 97165 OT EVAL LOW COMPLEX 30 MIN: CPT

## 2018-03-15 PROCEDURE — 87205 SMEAR GRAM STAIN: CPT

## 2018-03-15 PROCEDURE — 81003 URINALYSIS AUTO W/O SCOPE: CPT

## 2018-03-15 PROCEDURE — 92611 MOTION FLUOROSCOPY/SWALLOW: CPT

## 2018-03-15 PROCEDURE — 87040 BLOOD CULTURE FOR BACTERIA: CPT

## 2018-03-15 PROCEDURE — 74230 X-RAY XM SWLNG FUNCJ C+: CPT

## 2018-03-15 PROCEDURE — 97530 THERAPEUTIC ACTIVITIES: CPT

## 2018-03-15 PROCEDURE — 83735 ASSAY OF MAGNESIUM: CPT

## 2018-03-15 PROCEDURE — 70450 CT HEAD/BRAIN W/O DYE: CPT

## 2018-03-15 PROCEDURE — 92610 EVALUATE SWALLOWING FUNCTION: CPT

## 2018-03-15 PROCEDURE — 85027 COMPLETE CBC AUTOMATED: CPT

## 2018-03-15 PROCEDURE — 87476 LYME DIS DNA AMP PROBE: CPT

## 2018-03-15 PROCEDURE — 84439 ASSAY OF FREE THYROXINE: CPT

## 2018-03-15 PROCEDURE — 85610 PROTHROMBIN TIME: CPT

## 2018-03-15 PROCEDURE — 84166 PROTEIN E-PHORESIS/URINE/CSF: CPT

## 2018-03-15 PROCEDURE — 94150 VITAL CAPACITY TEST: CPT

## 2018-03-15 PROCEDURE — 82945 GLUCOSE OTHER FLUID: CPT

## 2018-03-15 PROCEDURE — 84157 ASSAY OF PROTEIN OTHER: CPT

## 2018-03-15 PROCEDURE — 72141 MRI NECK SPINE W/O DYE: CPT

## 2018-03-15 PROCEDURE — 71045 X-RAY EXAM CHEST 1 VIEW: CPT

## 2018-03-15 PROCEDURE — 72148 MRI LUMBAR SPINE W/O DYE: CPT

## 2018-03-15 PROCEDURE — 99232 SBSQ HOSP IP/OBS MODERATE 35: CPT

## 2018-03-15 PROCEDURE — 80061 LIPID PANEL: CPT

## 2018-03-15 PROCEDURE — 84443 ASSAY THYROID STIM HORMONE: CPT

## 2018-03-15 PROCEDURE — 85730 THROMBOPLASTIN TIME PARTIAL: CPT

## 2018-03-15 PROCEDURE — 82306 VITAMIN D 25 HYDROXY: CPT

## 2018-03-15 PROCEDURE — 97161 PT EVAL LOW COMPLEX 20 MIN: CPT

## 2018-03-15 PROCEDURE — 85652 RBC SED RATE AUTOMATED: CPT

## 2018-03-15 PROCEDURE — 76536 US EXAM OF HEAD AND NECK: CPT

## 2018-03-15 PROCEDURE — 82042 OTHER SOURCE ALBUMIN QUAN EA: CPT

## 2018-03-15 PROCEDURE — 84100 ASSAY OF PHOSPHORUS: CPT

## 2018-03-15 PROCEDURE — 99231 SBSQ HOSP IP/OBS SF/LOW 25: CPT

## 2018-03-15 PROCEDURE — 70551 MRI BRAIN STEM W/O DYE: CPT

## 2018-03-15 PROCEDURE — 97116 GAIT TRAINING THERAPY: CPT

## 2018-03-15 PROCEDURE — 99285 EMERGENCY DEPT VISIT HI MDM: CPT | Mod: 25

## 2018-03-15 PROCEDURE — 72146 MRI CHEST SPINE W/O DYE: CPT

## 2018-03-15 PROCEDURE — 84481 FREE ASSAY (FT-3): CPT

## 2018-03-15 PROCEDURE — 89051 BODY FLUID CELL COUNT: CPT

## 2018-03-15 RX ORDER — ACETAMINOPHEN 500 MG
1 TABLET ORAL
Qty: 0 | Refills: 0 | COMMUNITY
Start: 2018-03-15

## 2018-03-15 RX ORDER — HEPARIN SODIUM 5000 [USP'U]/ML
0 INJECTION INTRAVENOUS; SUBCUTANEOUS
Qty: 0 | Refills: 0 | COMMUNITY
Start: 2018-03-15

## 2018-03-15 RX ORDER — ACETAMINOPHEN 500 MG
2 TABLET ORAL
Qty: 0 | Refills: 0 | COMMUNITY
Start: 2018-03-15

## 2018-03-15 RX ADMIN — Medication 650 MILLIGRAM(S): at 06:25

## 2018-03-15 RX ADMIN — CITALOPRAM 10 MILLIGRAM(S): 10 TABLET, FILM COATED ORAL at 11:32

## 2018-03-15 RX ADMIN — BUPROPION HYDROCHLORIDE 150 MILLIGRAM(S): 150 TABLET, EXTENDED RELEASE ORAL at 11:32

## 2018-03-15 RX ADMIN — Medication 650 MILLIGRAM(S): at 05:51

## 2018-03-15 RX ADMIN — Medication 2000 UNIT(S): at 13:52

## 2018-03-15 RX ADMIN — Medication 325 MILLIGRAM(S): at 12:11

## 2018-03-15 RX ADMIN — Medication 325 MILLIGRAM(S): at 11:32

## 2018-03-15 NOTE — PROGRESS NOTE ADULT - PROBLEM SELECTOR PLAN 1
given now diffuse hyporeflexia and albuminocytologic dissociation. Abnormal MRI findings not concerning for cord compression.    - completed IVIG 5 day course.   - no airway compromise, will decrease NIF and VC to qd   - Will discontinue Lyrica as patient is having mostly numbness and not paresthesias and does not want to continue to take.   - PT/OT: acute rehab- Planned for discharge today at 2pm  - DVT prophylaxis   - f/u outpatient w/ Dr. Lopez for long term management

## 2018-03-15 NOTE — PROGRESS NOTE ADULT - SUBJECTIVE AND OBJECTIVE BOX
Cc: Difficulty walking    HPI:  Patient tolerating therapies. Denies pain.     MEDICATIONS  (STANDING):  acetaminophen   Tablet 325 milliGRAM(s) Oral daily  acetaminophen   Tablet. 325 milliGRAM(s) Oral daily  buPROPion XL . 150 milliGRAM(s) Oral daily  cholecalciferol 2000 Unit(s) Oral every 24 hours  citalopram 10 milliGRAM(s) Oral daily  heparin  Injectable 5000 Unit(s) SubCutaneous every 8 hours  Ospemifene (Osphena) 60 milliGRAM(s) 60 milliGRAM(s) Oral daily  pregabalin 25 milliGRAM(s) Oral daily  pregabalin 50 milliGRAM(s) Oral at bedtime  simvastatin 10 milliGRAM(s) Oral at bedtime  sodium chloride 0.9%. 250 milliLiter(s) (62.5 mL/Hr) IV Continuous <Continuous>    MEDICATIONS  (PRN):  acetaminophen   Tablet 650 milliGRAM(s) Oral every 6 hours PRN For Temp greater than 38 C (100.4 F)  acetaminophen   Tablet. 650 milliGRAM(s) Oral every 6 hours PRN Mild Pain (1 - 3)    Vital Signs Last 24 Hrs  T(C): 37.1 (15 Mar 2018 05:48), Max: 37.1 (14 Mar 2018 16:57)  T(F): 98.8 (15 Mar 2018 05:48), Max: 98.8 (15 Mar 2018 05:48)  HR: 71 (15 Mar 2018 05:48) (65 - 75)  BP: 104/68 (15 Mar 2018 05:48) (95/61 - 104/68)  BP(mean): --  RR: 18 (15 Mar 2018 05:48) (18 - 18)  SpO2: 96% (15 Mar 2018 05:48) (95% - 97%)    PHYSICAL EXAM  Constitutional - NAD, Comfortable  HEENT - NCAT, EOMI  Extremities - No C/C/E, No calf tenderness   Neurologic Exam -                    Cognitive - Awake, Alert, AAO to self, place, date, year, situation      Motor - No focal deficits                  Sensory - Diminished distal LEs, poor proprioception     Reflexes - DTRs 1+     Sit to Stand Supervision; amb w/o AD CG with ataxic gait  Psychiatric - Affect WNL      Impression:  63 yo with gait dysfunction secondary to GBS      Plan:  PT- ROM, Bed Mob, Transfers, Amb w AD  OT- ADLs  Prec- Falls  DVT Prophylaxis- Heparin SQ  Dyesthesias- Lyrica  Skin- Turn q2 h  Dispo- Acute Rehab- can tolerate 3h/d PT/OT/SLP and requires daily physician visits

## 2018-03-15 NOTE — PROGRESS NOTE ADULT - PROBLEM SELECTOR PROBLEM 1
Fever
R/O GBS (Guillain [place] syndrome)
R/O GBS (Guillain [place] syndrome)
R/O GBS (Guillain [place] syndrome)
R/O GBS (Guillain [place] syndrome)
R/O GBS (Guillain [place] syndrome)
R/O GBS (Guillain [place] syndrome)
R/O GBS (Guillain [place] syndrome)
R/O GBS (Guillain [place] syndrome)

## 2018-03-15 NOTE — PROGRESS NOTE ADULT - SUBJECTIVE AND OBJECTIVE BOX
Neurology Follow up note    Name: ANGELIQUE GONZALEZ    Subjective: No events overnight. Patient afebrile now. Infectious workup unremarkable. Patient pending discharge to acute rehab.     HPI: 63 yo woman who presents to [facility] on referral by her private neurologist, Dr. Barrios, w/ 1 day onset of gait instability w/ b/l LE hypo-reflexia. This was preceeded by an URI (2/12/18) while traveling in [place] [facility], treated w/ doxycyline, followed by b/l peripheral neuropathy (tingling) in finger and toes (2/16/18), followed by numbness in the abdominal region (3/5/18). Patient's symptoms refractory to meloxicam and lyrica, which was started 2 days ago. Pertinent hx includes meningioma s/p craniotomy (1993) w/ regular MRI every other year, cervical stenosis (seen on MRI c-spine). Patient noted that while on her trip in [place] [facility], she did go skiing w/ multiple subsequent falls however does not recall any particular falls resulting in any significant trauma.    PMH/PSH:   HLD  meningioma s/p craniotomy w/ residual right patellar hyper-reflexia   MDD    post-menopausal     MEDICATIONS  (STANDING):  acetaminophen   Tablet 325 milliGRAM(s) Oral daily  acetaminophen   Tablet. 325 milliGRAM(s) Oral daily  buPROPion XL . 150 milliGRAM(s) Oral daily  cholecalciferol 2000 Unit(s) Oral every 24 hours  citalopram 10 milliGRAM(s) Oral daily  heparin  Injectable 5000 Unit(s) SubCutaneous every 8 hours  Ospemifene (Osphena) 60 milliGRAM(s) 60 milliGRAM(s) Oral daily  pregabalin 25 milliGRAM(s) Oral daily  pregabalin 50 milliGRAM(s) Oral at bedtime  simvastatin 10 milliGRAM(s) Oral at bedtime  sodium chloride 0.9%. 250 milliLiter(s) (62.5 mL/Hr) IV Continuous <Continuous>  sodium chloride 0.9%. 1000 milliLiter(s) (65 mL/Hr) IV Continuous <Continuous>  sodium chloride 0.9%. 1000 milliLiter(s) (65 mL/Hr) IV Continuous <Continuous>    MEDICATIONS  (PRN):  acetaminophen   Tablet 650 milliGRAM(s) Oral every 6 hours PRN For Temp greater than 38 C (100.4 F)  acetaminophen   Tablet. 650 milliGRAM(s) Oral every 6 hours PRN Mild Pain (1 - 3)    Allergies: No Known Allergies    Objective:  Vital Signs Last 24 Hrs  T(C): 37.4 (15 Mar 2018 12:27), Max: 37.4 (15 Mar 2018 12:27)  T(F): 99.3 (15 Mar 2018 12:27), Max: 99.3 (15 Mar 2018 12:27)  HR: 78 (15 Mar 2018 12:27) (65 - 78)  BP: 104/69 (15 Mar 2018 12:27) (95/61 - 104/69)  BP(mean): --  RR: 18 (15 Mar 2018 12:27) (18 - 18)  SpO2: 98% (15 Mar 2018 12:27) (95% - 98%)    GENERAL EXAM: No acute distress      breathing appears completely comfortable, normal rate, no dyspnea           NEUROLOGICAL EXAM:  Mental Status: AAOx3, fluent speech, follows simple commands, able to name, coherent, alert  Motor: 5/5 b/l deltoid/tricep/bicep/handgrip/wrist extension and 4/5 flexion/FF,  4+/5 left dorsiflexion, HF, Ham, rest 5/5,   Sensation: decreased proprioception b/l toes, sensory level in abdominal region only in area of T5-T10 approx to PP/LT   Coordination: FTN intact b/l, unsteady gait, on functional testing patient unable to squat, unable to tandem.   Reflexes: 1+ R/L brachioradialis, rest arreflexic, plantars mute     Radiology    < from: MR Lumbar Spine No Cont (03.09.18 @ 22:26) >  At T3-4 there a disc deformity and ventral cord displacement, a ventral   cord herniation is not excluded, axial T2 series 7 image 4, sagittal   series 3 image 8.    Multilevel degenerative changes  in the cervical and lumbar spine as   detailed above without  severe canal compromise or cord compression.    Questionable areas of slight high T2 signal in the cervical cord may   reflect motion, suggest repeat imaging when patient can hold still with   dimensions region.       < from: MR Head No Cont (03.09.18 @ 22:26) >  IMPRESSION:     Nonspecific hyperintense T2 signal in the white matter likely   microvascular disease without evidence for any restricted diffusion,   hemorrhage or midline shift.    < end of copied text >

## 2018-03-15 NOTE — PROGRESS NOTE ADULT - PROVIDER SPECIALTY LIST ADULT
Hospitalist
Neurology
Rehab Medicine
Hospitalist

## 2018-03-16 PROCEDURE — 93010 ELECTROCARDIOGRAM REPORT: CPT | Mod: 77

## 2018-03-16 PROCEDURE — 99222 1ST HOSP IP/OBS MODERATE 55: CPT | Mod: AI

## 2018-03-16 PROCEDURE — 99254 IP/OBS CNSLTJ NEW/EST MOD 60: CPT

## 2018-03-16 PROCEDURE — 93010 ELECTROCARDIOGRAM REPORT: CPT

## 2018-03-17 PROCEDURE — 99232 SBSQ HOSP IP/OBS MODERATE 35: CPT | Mod: GC

## 2018-03-18 LAB
CULTURE RESULTS: SIGNIFICANT CHANGE UP
CULTURE RESULTS: SIGNIFICANT CHANGE UP
SPECIMEN SOURCE: SIGNIFICANT CHANGE UP
SPECIMEN SOURCE: SIGNIFICANT CHANGE UP

## 2018-03-18 PROCEDURE — 99232 SBSQ HOSP IP/OBS MODERATE 35: CPT | Mod: GC

## 2018-03-18 PROCEDURE — 99233 SBSQ HOSP IP/OBS HIGH 50: CPT

## 2018-03-19 PROCEDURE — 99232 SBSQ HOSP IP/OBS MODERATE 35: CPT

## 2018-03-19 PROCEDURE — 99233 SBSQ HOSP IP/OBS HIGH 50: CPT

## 2018-03-20 PROCEDURE — 99232 SBSQ HOSP IP/OBS MODERATE 35: CPT | Mod: GC

## 2018-03-21 PROCEDURE — 83036 HEMOGLOBIN GLYCOSYLATED A1C: CPT

## 2018-03-21 PROCEDURE — 93005 ELECTROCARDIOGRAM TRACING: CPT

## 2018-03-21 PROCEDURE — 97163 PT EVAL HIGH COMPLEX 45 MIN: CPT

## 2018-03-21 PROCEDURE — 97112 NEUROMUSCULAR REEDUCATION: CPT

## 2018-03-21 PROCEDURE — 97167 OT EVAL HIGH COMPLEX 60 MIN: CPT

## 2018-03-21 PROCEDURE — 92610 EVALUATE SWALLOWING FUNCTION: CPT

## 2018-03-21 PROCEDURE — 92523 SPEECH SOUND LANG COMPREHEN: CPT

## 2018-03-21 PROCEDURE — 83735 ASSAY OF MAGNESIUM: CPT

## 2018-03-21 PROCEDURE — 82607 VITAMIN B-12: CPT

## 2018-03-21 PROCEDURE — 97530 THERAPEUTIC ACTIVITIES: CPT

## 2018-03-21 PROCEDURE — 80053 COMPREHEN METABOLIC PANEL: CPT

## 2018-03-21 PROCEDURE — 97110 THERAPEUTIC EXERCISES: CPT

## 2018-03-21 PROCEDURE — 85025 COMPLETE CBC W/AUTO DIFF WBC: CPT

## 2018-03-21 PROCEDURE — 84425 ASSAY OF VITAMIN B-1: CPT

## 2018-03-21 PROCEDURE — 99238 HOSP IP/OBS DSCHRG MGMT 30/<: CPT

## 2018-03-21 PROCEDURE — 97535 SELF CARE MNGMENT TRAINING: CPT

## 2018-03-21 PROCEDURE — 97116 GAIT TRAINING THERAPY: CPT

## 2018-03-21 PROCEDURE — 92507 TX SP LANG VOICE COMM INDIV: CPT

## 2018-03-21 PROCEDURE — 84207 ASSAY OF VITAMIN B-6: CPT

## 2018-03-21 PROCEDURE — 84100 ASSAY OF PHOSPHORUS: CPT

## 2018-03-30 ENCOUNTER — TRANSCRIPTION ENCOUNTER (OUTPATIENT)
Age: 63
End: 2018-03-30

## 2018-05-02 ENCOUNTER — APPOINTMENT (OUTPATIENT)
Dept: NEUROLOGY | Facility: CLINIC | Age: 63
End: 2018-05-02
Payer: COMMERCIAL

## 2018-05-02 VITALS
SYSTOLIC BLOOD PRESSURE: 115 MMHG | DIASTOLIC BLOOD PRESSURE: 72 MMHG | HEIGHT: 65 IN | HEART RATE: 64 BPM | WEIGHT: 146 LBS | BODY MASS INDEX: 24.32 KG/M2

## 2018-05-02 PROCEDURE — 99214 OFFICE O/P EST MOD 30 MIN: CPT

## 2018-05-02 RX ORDER — PREGABALIN 25 MG/1
25 CAPSULE ORAL
Refills: 0 | Status: DISCONTINUED | COMMUNITY
End: 2018-05-02

## 2018-06-07 ENCOUNTER — APPOINTMENT (OUTPATIENT)
Dept: NEUROLOGY | Facility: CLINIC | Age: 63
End: 2018-06-07
Payer: COMMERCIAL

## 2018-06-07 VITALS
HEIGHT: 65 IN | HEART RATE: 86 BPM | WEIGHT: 143 LBS | BODY MASS INDEX: 23.82 KG/M2 | SYSTOLIC BLOOD PRESSURE: 129 MMHG | DIASTOLIC BLOOD PRESSURE: 76 MMHG

## 2018-06-07 PROCEDURE — 99214 OFFICE O/P EST MOD 30 MIN: CPT

## 2018-06-26 ENCOUNTER — APPOINTMENT (OUTPATIENT)
Dept: PHYSICAL MEDICINE AND REHAB | Facility: CLINIC | Age: 63
End: 2018-06-26
Payer: COMMERCIAL

## 2018-06-26 VITALS
OXYGEN SATURATION: 100 % | SYSTOLIC BLOOD PRESSURE: 106 MMHG | HEART RATE: 65 BPM | RESPIRATION RATE: 14 BRPM | BODY MASS INDEX: 23.32 KG/M2 | WEIGHT: 140 LBS | DIASTOLIC BLOOD PRESSURE: 73 MMHG | HEIGHT: 65 IN

## 2018-06-26 DIAGNOSIS — Z87.898 PERSONAL HISTORY OF OTHER SPECIFIED CONDITIONS: ICD-10-CM

## 2018-06-26 PROCEDURE — 99215 OFFICE O/P EST HI 40 MIN: CPT

## 2018-07-10 ENCOUNTER — APPOINTMENT (OUTPATIENT)
Dept: NEUROLOGY | Facility: CLINIC | Age: 63
End: 2018-07-10
Payer: COMMERCIAL

## 2018-07-10 VITALS
BODY MASS INDEX: 23.32 KG/M2 | HEIGHT: 65 IN | DIASTOLIC BLOOD PRESSURE: 89 MMHG | SYSTOLIC BLOOD PRESSURE: 135 MMHG | HEART RATE: 76 BPM | WEIGHT: 140 LBS

## 2018-07-10 PROCEDURE — 99213 OFFICE O/P EST LOW 20 MIN: CPT

## 2018-07-10 RX ORDER — DOXYCYCLINE 100 MG/1
100 CAPSULE ORAL
Qty: 20 | Refills: 0 | Status: DISCONTINUED | COMMUNITY
Start: 2018-02-17

## 2018-07-26 ENCOUNTER — APPOINTMENT (OUTPATIENT)
Dept: PHYSICAL MEDICINE AND REHAB | Facility: CLINIC | Age: 63
End: 2018-07-26

## 2018-08-17 ENCOUNTER — APPOINTMENT (OUTPATIENT)
Dept: FAMILY MEDICINE | Facility: CLINIC | Age: 63
End: 2018-08-17
Payer: COMMERCIAL

## 2018-08-17 ENCOUNTER — TRANSCRIPTION ENCOUNTER (OUTPATIENT)
Age: 63
End: 2018-08-17

## 2018-08-17 ENCOUNTER — NON-APPOINTMENT (OUTPATIENT)
Age: 63
End: 2018-08-17

## 2018-08-17 VITALS
HEART RATE: 69 BPM | SYSTOLIC BLOOD PRESSURE: 118 MMHG | WEIGHT: 143 LBS | BODY MASS INDEX: 23.82 KG/M2 | HEIGHT: 65 IN | DIASTOLIC BLOOD PRESSURE: 68 MMHG | OXYGEN SATURATION: 97 %

## 2018-08-17 DIAGNOSIS — Z13.1 ENCOUNTER FOR SCREENING FOR DIABETES MELLITUS: ICD-10-CM

## 2018-08-17 DIAGNOSIS — Z13.220 ENCOUNTER FOR SCREENING FOR LIPOID DISORDERS: ICD-10-CM

## 2018-08-17 DIAGNOSIS — Z80.42 FAMILY HISTORY OF MALIGNANT NEOPLASM OF PROSTATE: ICD-10-CM

## 2018-08-17 DIAGNOSIS — Z82.69 FAMILY HISTORY OF OTHER DISEASES OF THE MUSCULOSKELETAL SYSTEM AND CONNECTIVE TISSUE: ICD-10-CM

## 2018-08-17 DIAGNOSIS — Z13.21 ENCOUNTER FOR SCREENING FOR NUTRITIONAL DISORDER: ICD-10-CM

## 2018-08-17 PROCEDURE — 36415 COLL VENOUS BLD VENIPUNCTURE: CPT

## 2018-08-17 PROCEDURE — 99386 PREV VISIT NEW AGE 40-64: CPT | Mod: 25

## 2018-08-17 PROCEDURE — 93000 ELECTROCARDIOGRAM COMPLETE: CPT

## 2018-08-17 RX ORDER — CYANOCOBALAMIN
1000 KIT
Qty: 1 | Refills: 0 | Status: DISCONTINUED | COMMUNITY
Start: 2018-06-26 | End: 2018-08-17

## 2018-08-17 RX ORDER — MELOXICAM 15 MG/1
TABLET ORAL
Refills: 0 | Status: DISCONTINUED | COMMUNITY
End: 2018-08-17

## 2018-08-17 NOTE — ASSESSMENT
[FreeTextEntry1] : SR at 60 bpm, normal axis no ST/T changes \par \par Neurology f/u c Dr. Lopez 8/20/18  (pt currently 100% disabled until further notice) \par \par Psych NP: Mimi Plummer ([place], [place]) f/u q 3M \par \par see lab orders \par \par see radiology orders \par \par see referral

## 2018-08-17 NOTE — PHYSICAL EXAM
[No Acute Distress] : no acute distress [Well Nourished] : well nourished [Well Developed] : well developed [Well-Appearing] : well-appearing [PERRL] : pupils equal round and reactive to light [EOMI] : extraocular movements intact [Normal Oropharynx] : the oropharynx was normal [No JVD] : no jugular venous distention [Supple] : supple [No Lymphadenopathy] : no lymphadenopathy [No Respiratory Distress] : no respiratory distress  [Clear to Auscultation] : lungs were clear to auscultation bilaterally [No Accessory Muscle Use] : no accessory muscle use [Normal Rate] : normal rate  [Regular Rhythm] : with a regular rhythm [Normal S1, S2] : normal S1 and S2 [No Carotid Bruits] : no carotid bruits [No Abdominal Bruit] : a ~M bruit was not heard ~T in the abdomen [Pedal Pulses Present] : the pedal pulses are present [No Edema] : there was no peripheral edema [No Extremity Clubbing/Cyanosis] : no extremity clubbing/cyanosis [No Palpable Aorta] : no palpable aorta [Normal Appearance] : normal in appearance [No Nipple Discharge] : no nipple discharge [No Axillary Lymphadenopathy] : no axillary lymphadenopathy [Soft] : abdomen soft [Non Tender] : non-tender [Non-distended] : non-distended [No Masses] : no abdominal mass palpated [No HSM] : no HSM [Normal Bowel Sounds] : normal bowel sounds [Normal Axillary Nodes] : no axillary lymphadenopathy [Normal Posterior Cervical Nodes] : no posterior cervical lymphadenopathy [Normal Anterior Cervical Nodes] : no anterior cervical lymphadenopathy [No CVA Tenderness] : no CVA  tenderness [Grossly Normal Strength/Tone] : grossly normal strength/tone [No Rash] : no rash [Normal Gait] : normal gait [Coordination Grossly Intact] : coordination grossly intact [No Focal Deficits] : no focal deficits [Normal Affect] : the affect was normal [Normal Mood] : the mood was normal [Normal Insight/Judgement] : insight and judgment were intact [de-identified] : +LE varicosities B/L

## 2018-08-17 NOTE — HISTORY OF PRESENT ILLNESS
[FreeTextEntry1] : Patient here for CPE.  Patient c/o fatigue. 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 Hart ([place]) last f/u 12/17.   3 hr fast.  no CP/SOB c activity,  Denies dizziness, no palpitations \par no N/V/D +BM qod no bloody/black stools no urinary/bowel incontinence no urinary complaints \par \par Dx'ed c GBS 3/8/18, hospital admission 3/8/18-3/14/18 [facility] d/c'ed [facility] [place] x 1 week and then d/c'ed home.  pt has been engaged in PT 2x/week since c slowly improving motor skills to the point where she is now at a plateau. As per pt  only "10%" recovered.     \par  \par Next Neuro f/u scheduled 8/20/18

## 2018-08-17 NOTE — REVIEW OF SYSTEMS
[Fatigue] : fatigue [Muscle Weakness] : muscle weakness [Suicidal] : not suicidal [Negative] : Integumentary [FreeTextEntry9] : see HPI  [de-identified] : see HPI  [de-identified] : Hx of depression

## 2018-08-20 ENCOUNTER — APPOINTMENT (OUTPATIENT)
Dept: NEUROLOGY | Facility: CLINIC | Age: 63
End: 2018-08-20
Payer: COMMERCIAL

## 2018-08-20 ENCOUNTER — OTHER (OUTPATIENT)
Age: 63
End: 2018-08-20

## 2018-08-20 ENCOUNTER — TRANSCRIPTION ENCOUNTER (OUTPATIENT)
Age: 63
End: 2018-08-20

## 2018-08-20 VITALS
HEIGHT: 65 IN | DIASTOLIC BLOOD PRESSURE: 81 MMHG | HEART RATE: 73 BPM | SYSTOLIC BLOOD PRESSURE: 134 MMHG | BODY MASS INDEX: 23.82 KG/M2 | WEIGHT: 143 LBS

## 2018-08-20 LAB
25(OH)D3 SERPL-MCNC: 37.2 NG/ML
ALBUMIN SERPL ELPH-MCNC: 4.8 G/DL
ALP BLD-CCNC: 75 U/L
ALT SERPL-CCNC: 34 U/L
ANION GAP SERPL CALC-SCNC: 17 MMOL/L
APPEARANCE: CLEAR
AST SERPL-CCNC: 31 U/L
BACTERIA: NEGATIVE
BASOPHILS # BLD AUTO: 0.02 K/UL
BASOPHILS NFR BLD AUTO: 0.4 %
BILIRUB SERPL-MCNC: 0.6 MG/DL
BILIRUBIN URINE: NEGATIVE
BLOOD URINE: NEGATIVE
BUN SERPL-MCNC: 16 MG/DL
CALCIUM SERPL-MCNC: 9.6 MG/DL
CHLORIDE SERPL-SCNC: 100 MMOL/L
CHOLEST SERPL-MCNC: 190 MG/DL
CHOLEST/HDLC SERPL: 2.6 RATIO
CK SERPL-CCNC: 47 U/L
CO2 SERPL-SCNC: 24 MMOL/L
COLOR: YELLOW
CREAT SERPL-MCNC: 0.81 MG/DL
EOSINOPHIL # BLD AUTO: 0.14 K/UL
EOSINOPHIL NFR BLD AUTO: 2.7 %
GGT SERPL-CCNC: 37 U/L
GLUCOSE QUALITATIVE U: NEGATIVE MG/DL
GLUCOSE SERPL-MCNC: 88 MG/DL
HBA1C MFR BLD HPLC: 5.3 %
HCT VFR BLD CALC: 40.1 %
HDLC SERPL-MCNC: 73 MG/DL
HGB BLD-MCNC: 12.8 G/DL
IMM GRANULOCYTES NFR BLD AUTO: 0.2 %
KETONES URINE: NEGATIVE
LDLC SERPL CALC-MCNC: 101 MG/DL
LEUKOCYTE ESTERASE URINE: ABNORMAL
LYMPHOCYTES # BLD AUTO: 1.73 K/UL
LYMPHOCYTES NFR BLD AUTO: 33.9 %
MAN DIFF?: NORMAL
MCHC RBC-ENTMCNC: 30.8 PG
MCHC RBC-ENTMCNC: 31.9 GM/DL
MCV RBC AUTO: 96.6 FL
MICROSCOPIC-UA: NORMAL
MONOCYTES # BLD AUTO: 0.65 K/UL
MONOCYTES NFR BLD AUTO: 12.7 %
NEUTROPHILS # BLD AUTO: 2.55 K/UL
NEUTROPHILS NFR BLD AUTO: 50.1 %
NITRITE URINE: NEGATIVE
PH URINE: 6
PLATELET # BLD AUTO: 277 K/UL
POTASSIUM SERPL-SCNC: 4.2 MMOL/L
PROT SERPL-MCNC: 6.7 G/DL
PROTEIN URINE: NEGATIVE MG/DL
RBC # BLD: 4.15 M/UL
RBC # FLD: 13.5 %
RED BLOOD CELLS URINE: 2 /HPF
SODIUM SERPL-SCNC: 141 MMOL/L
SPECIFIC GRAVITY URINE: 1.01
SQUAMOUS EPITHELIAL CELLS: 0 /HPF
T3 SERPL-MCNC: 99 NG/DL
T4 SERPL-MCNC: 5.8 UG/DL
TRIGL SERPL-MCNC: 79 MG/DL
TSH SERPL-ACNC: 2.27 UIU/ML
URATE SERPL-MCNC: 2.8 MG/DL
UROBILINOGEN URINE: NEGATIVE MG/DL
WBC # FLD AUTO: 5.1 K/UL
WHITE BLOOD CELLS URINE: 2 /HPF

## 2018-08-20 PROCEDURE — 99213 OFFICE O/P EST LOW 20 MIN: CPT

## 2018-09-25 ENCOUNTER — APPOINTMENT (OUTPATIENT)
Dept: FAMILY MEDICINE | Facility: CLINIC | Age: 63
End: 2018-09-25

## 2018-10-08 ENCOUNTER — TRANSCRIPTION ENCOUNTER (OUTPATIENT)
Age: 63
End: 2018-10-08

## 2018-10-08 NOTE — DIETITIAN INITIAL EVALUATION ADULT. - PROBLEM SELECTOR PROBLEM 1
WBC still a little elevated but is much improved. Likely due to steroids and recent pneumonia. No further action needed at this time. F/u with PCP as scheduled. Numbness and tingling

## 2018-10-11 ENCOUNTER — TRANSCRIPTION ENCOUNTER (OUTPATIENT)
Age: 63
End: 2018-10-11

## 2018-10-16 ENCOUNTER — APPOINTMENT (OUTPATIENT)
Dept: NEUROLOGY | Facility: CLINIC | Age: 63
End: 2018-10-16

## 2018-10-18 ENCOUNTER — RESULT REVIEW (OUTPATIENT)
Age: 63
End: 2018-10-18

## 2018-10-19 ENCOUNTER — TRANSCRIPTION ENCOUNTER (OUTPATIENT)
Age: 63
End: 2018-10-19

## 2018-11-06 PROBLEM — F32.9 MAJOR DEPRESSIVE DISORDER, SINGLE EPISODE, UNSPECIFIED: Chronic | Status: ACTIVE | Noted: 2018-03-08

## 2018-11-06 PROBLEM — E78.5 HYPERLIPIDEMIA, UNSPECIFIED: Chronic | Status: ACTIVE | Noted: 2018-03-08

## 2018-11-06 PROBLEM — D32.9 BENIGN NEOPLASM OF MENINGES, UNSPECIFIED: Chronic | Status: ACTIVE | Noted: 2018-03-08

## 2018-11-16 ENCOUNTER — APPOINTMENT (OUTPATIENT)
Dept: COLORECTAL SURGERY | Facility: CLINIC | Age: 63
End: 2018-11-16
Payer: COMMERCIAL

## 2018-11-16 VITALS
HEART RATE: 74 BPM | SYSTOLIC BLOOD PRESSURE: 110 MMHG | BODY MASS INDEX: 23.82 KG/M2 | HEIGHT: 65 IN | WEIGHT: 143 LBS | RESPIRATION RATE: 14 BRPM | DIASTOLIC BLOOD PRESSURE: 70 MMHG

## 2018-11-16 PROCEDURE — 99245 OFF/OP CONSLTJ NEW/EST HI 55: CPT

## 2018-11-19 ENCOUNTER — APPOINTMENT (OUTPATIENT)
Dept: NEUROLOGY | Facility: CLINIC | Age: 63
End: 2018-11-19
Payer: COMMERCIAL

## 2018-11-19 VITALS
DIASTOLIC BLOOD PRESSURE: 72 MMHG | HEIGHT: 65 IN | SYSTOLIC BLOOD PRESSURE: 125 MMHG | WEIGHT: 143 LBS | BODY MASS INDEX: 23.82 KG/M2 | HEART RATE: 70 BPM

## 2018-11-19 PROCEDURE — 99213 OFFICE O/P EST LOW 20 MIN: CPT

## 2018-12-17 ENCOUNTER — TRANSCRIPTION ENCOUNTER (OUTPATIENT)
Age: 63
End: 2018-12-17

## 2019-01-11 ENCOUNTER — RECORD ABSTRACTING (OUTPATIENT)
Age: 64
End: 2019-01-11

## 2019-01-11 DIAGNOSIS — Z79.890 HORMONE REPLACEMENT THERAPY: ICD-10-CM

## 2019-01-11 LAB — CYTOLOGY CVX/VAG DOC THIN PREP: NORMAL

## 2019-01-14 ENCOUNTER — TRANSCRIPTION ENCOUNTER (OUTPATIENT)
Age: 64
End: 2019-01-14

## 2019-02-22 ENCOUNTER — APPOINTMENT (OUTPATIENT)
Dept: OBGYN | Facility: CLINIC | Age: 64
End: 2019-02-22
Payer: COMMERCIAL

## 2019-02-22 VITALS
DIASTOLIC BLOOD PRESSURE: 60 MMHG | WEIGHT: 147 LBS | BODY MASS INDEX: 24.49 KG/M2 | HEIGHT: 65 IN | SYSTOLIC BLOOD PRESSURE: 110 MMHG

## 2019-02-22 DIAGNOSIS — R68.82 DECREASED LIBIDO: ICD-10-CM

## 2019-02-22 DIAGNOSIS — Z82.49 FAMILY HISTORY OF ISCHEMIC HEART DISEASE AND OTHER DISEASES OF THE CIRCULATORY SYSTEM: ICD-10-CM

## 2019-02-22 PROCEDURE — XXXXX: CPT

## 2019-02-22 PROCEDURE — 56605 BIOPSY OF VULVA/PERINEUM: CPT

## 2019-02-22 PROCEDURE — 56606 BIOPSY OF VULVA/PERINEUM: CPT

## 2019-03-05 ENCOUNTER — APPOINTMENT (OUTPATIENT)
Dept: COLORECTAL SURGERY | Facility: CLINIC | Age: 64
End: 2019-03-05
Payer: COMMERCIAL

## 2019-03-05 PROCEDURE — 45378 DIAGNOSTIC COLONOSCOPY: CPT

## 2019-03-20 LAB — CORE LAB BIOPSY: NORMAL

## 2019-04-04 ENCOUNTER — TRANSCRIPTION ENCOUNTER (OUTPATIENT)
Age: 64
End: 2019-04-04

## 2019-04-05 ENCOUNTER — APPOINTMENT (OUTPATIENT)
Dept: FAMILY MEDICINE | Facility: CLINIC | Age: 64
End: 2019-04-05

## 2019-04-12 ENCOUNTER — APPOINTMENT (OUTPATIENT)
Dept: OBGYN | Facility: CLINIC | Age: 64
End: 2019-04-12
Payer: COMMERCIAL

## 2019-04-12 VITALS
HEIGHT: 65 IN | WEIGHT: 150 LBS | SYSTOLIC BLOOD PRESSURE: 120 MMHG | BODY MASS INDEX: 24.99 KG/M2 | DIASTOLIC BLOOD PRESSURE: 70 MMHG

## 2019-04-12 DIAGNOSIS — N90.89 OTHER SPECIFIED NONINFLAMMATORY DISORDERS OF VULVA AND PERINEUM: ICD-10-CM

## 2019-04-12 PROCEDURE — 99212 OFFICE O/P EST SF 10 MIN: CPT

## 2019-04-12 PROCEDURE — 81003 URINALYSIS AUTO W/O SCOPE: CPT | Mod: QW

## 2019-04-12 PROCEDURE — XXXXX: CPT

## 2019-04-22 ENCOUNTER — APPOINTMENT (OUTPATIENT)
Dept: NEUROLOGY | Facility: CLINIC | Age: 64
End: 2019-04-22

## 2019-08-19 ENCOUNTER — NON-APPOINTMENT (OUTPATIENT)
Age: 64
End: 2019-08-19

## 2019-08-19 ENCOUNTER — LABORATORY RESULT (OUTPATIENT)
Age: 64
End: 2019-08-19

## 2019-08-19 ENCOUNTER — APPOINTMENT (OUTPATIENT)
Dept: FAMILY MEDICINE | Facility: CLINIC | Age: 64
End: 2019-08-19
Payer: COMMERCIAL

## 2019-08-19 VITALS
SYSTOLIC BLOOD PRESSURE: 120 MMHG | WEIGHT: 148 LBS | DIASTOLIC BLOOD PRESSURE: 70 MMHG | HEART RATE: 67 BPM | OXYGEN SATURATION: 98 % | HEIGHT: 65 IN | BODY MASS INDEX: 24.66 KG/M2

## 2019-08-19 VITALS — SYSTOLIC BLOOD PRESSURE: 102 MMHG | DIASTOLIC BLOOD PRESSURE: 70 MMHG

## 2019-08-19 DIAGNOSIS — M79.10 MYALGIA, UNSPECIFIED SITE: ICD-10-CM

## 2019-08-19 PROCEDURE — 99396 PREV VISIT EST AGE 40-64: CPT | Mod: 25

## 2019-08-19 PROCEDURE — 36415 COLL VENOUS BLD VENIPUNCTURE: CPT

## 2019-08-19 PROCEDURE — 93000 ELECTROCARDIOGRAM COMPLETE: CPT

## 2019-08-19 RX ORDER — ESTRADIOL 0.1 MG/G
0.1 CREAM VAGINAL
Refills: 0 | Status: DISCONTINUED | COMMUNITY
End: 2019-08-19

## 2019-08-19 RX ORDER — PROGESTERONE 100 MG/1
100 CAPSULE ORAL
Refills: 0 | Status: DISCONTINUED | COMMUNITY
End: 2019-08-19

## 2019-08-19 RX ORDER — ESTRADIOL 0.25 MG/.25G
0.25 GEL TOPICAL
Refills: 0 | Status: DISCONTINUED | COMMUNITY
End: 2019-08-19

## 2019-08-19 NOTE — END OF VISIT
[FreeTextEntry3] : Medical record entries made by the scribe today today, were at my direction and personally dictated to them by me, Dr. Carmela Fritz on Aug 19, 2019. I have reviewed the chart and agree that the record accurately reflects my personal performance of the history, physical exam, assessment, and plan.\par \par

## 2019-08-19 NOTE — HEALTH RISK ASSESSMENT
[Patient reported mammogram was normal] : Patient reported mammogram was normal [Patient reported PAP Smear was normal] : Patient reported PAP Smear was normal [Patient reported bone density results were normal] : Patient reported bone density results were normal [Patient reported colonoscopy was normal] : Patient reported colonoscopy was normal [Good] : ~his/her~ current health as good [Yes] : Yes [Monthly or less (1 pt)] : Monthly or less (1 point) [1 or 2 (0 pts)] : 1 or 2 (0 points) [Never (0 pts)] : Never (0 points) [No] : In the past 12 months have you used drugs other than those required for medical reasons? No [None] : None [Significant Other] : lives with significant other [Feels Safe at Home] : Feels safe at home [Sexually Active] : sexually active [] : No [de-identified] : walking/yoga  [de-identified] : social  [de-identified] : well balanced [PapSmearDate] : 10/18/18 [MammogramDate] : 11/17/18 [ColonoscopyDate] : 3/4/19 [BoneDensityDate] : 11/17/18 [ColonoscopyComments] :

## 2019-08-19 NOTE — PHYSICAL EXAM
[No Acute Distress] : no acute distress [Well Nourished] : well nourished [Well Developed] : well developed [Well-Appearing] : well-appearing [PERRL] : pupils equal round and reactive to light [Normal Sclera/Conjunctiva] : normal sclera/conjunctiva [EOMI] : extraocular movements intact [Normal Outer Ear/Nose] : the outer ears and nose were normal in appearance [Normal Oropharynx] : the oropharynx was normal [No JVD] : no jugular venous distention [No Lymphadenopathy] : no lymphadenopathy [Thyroid Normal, No Nodules] : the thyroid was normal and there were no nodules present [Supple] : supple [No Accessory Muscle Use] : no accessory muscle use [No Respiratory Distress] : no respiratory distress  [Clear to Auscultation] : lungs were clear to auscultation bilaterally [Normal Rate] : normal rate  [No Murmur] : no murmur heard [Normal S1, S2] : normal S1 and S2 [Regular Rhythm] : with a regular rhythm [No Carotid Bruits] : no carotid bruits [No Abdominal Bruit] : a ~M bruit was not heard ~T in the abdomen [Pedal Pulses Present] : the pedal pulses are present [No Varicosities] : no varicosities [No Palpable Aorta] : no palpable aorta [No Edema] : there was no peripheral edema [No Extremity Clubbing/Cyanosis] : no extremity clubbing/cyanosis [Soft] : abdomen soft [Non Tender] : non-tender [Non-distended] : non-distended [No Masses] : no abdominal mass palpated [Normal Posterior Cervical Nodes] : no posterior cervical lymphadenopathy [No HSM] : no HSM [Normal Bowel Sounds] : normal bowel sounds [Normal Anterior Cervical Nodes] : no anterior cervical lymphadenopathy [No Joint Swelling] : no joint swelling [No CVA Tenderness] : no CVA  tenderness [No Spinal Tenderness] : no spinal tenderness [Grossly Normal Strength/Tone] : grossly normal strength/tone [No Rash] : no rash [Coordination Grossly Intact] : coordination grossly intact [No Focal Deficits] : no focal deficits [Normal Gait] : normal gait [Deep Tendon Reflexes (DTR)] : deep tendon reflexes were 2+ and symmetric [Normal Affect] : the affect was normal [Normal Insight/Judgement] : insight and judgment were intact

## 2019-08-19 NOTE — ADDENDUM
[FreeTextEntry1] : I, Yuridia Williamson acting as a scribe for Dr. Carmela Fritz on Aug 19, 2019  at 2:06 PM\par

## 2019-08-19 NOTE — HISTORY OF PRESENT ILLNESS
[FreeTextEntry1] : CPE [de-identified] : ROSETTE BRADLEY is a 63 year female here for CPE. Mood is good. Pt has osteopenia and is currently taking vitamin D and calcium. Pt is active, she walks and does yoga. She is also taking Simvastatin 20 mg. Reports 2/18 dx with GBS onset due to URI. States she was in the hospital for x1 week and rehab for x1 week. She states that during that time, she was unable to walk. Since than, she reports a lack of stamina. She states that when she does tend to exercise, she experiences general muscle aches. Pt c/o waking up during the night with LE cramps.

## 2019-08-19 NOTE — PAST MEDICAL HISTORY
[Menarche Age ____] : age at menarche was [unfilled] [Postmenopausal] : postmenopausal [Definite ___ (Date)] : the last menstrual period was [unfilled] [FreeTextEntry1] : oophorectomy

## 2019-08-20 LAB
25(OH)D3 SERPL-MCNC: 50.3 NG/ML
CHOLEST SERPL-MCNC: 221 MG/DL
CHOLEST SERPL-MCNC: 224 MG/DL
CHOLEST/HDLC SERPL: 2.8 RATIO
CHOLEST/HDLC SERPL: 3 RATIO
CK SERPL-CCNC: 57 U/L
HCV AB SER QL: NONREACTIVE
HCV S/CO RATIO: 0.09 S/CO
HDLC SERPL-MCNC: 75 MG/DL
HDLC SERPL-MCNC: 78 MG/DL
HIV1+2 AB SPEC QL IA.RAPID: NONREACTIVE
LDLC SERPL CALC-MCNC: 128 MG/DL
LDLC SERPL CALC-MCNC: 134 MG/DL
MAGNESIUM SERPL-MCNC: 2.1 MG/DL
TRIGL SERPL-MCNC: 77 MG/DL
TRIGL SERPL-MCNC: 77 MG/DL
URATE SERPL-MCNC: 3.3 MG/DL

## 2019-08-21 PROBLEM — R68.82 LIBIDO, DECREASED: Status: ACTIVE | Noted: 2019-01-11

## 2019-08-21 PROBLEM — Z82.49 FAMILY HISTORY OF CARDIAC DISORDER: Status: ACTIVE | Noted: 2018-08-17

## 2019-08-21 PROBLEM — N90.89 VULVAR LESION: Status: ACTIVE | Noted: 2019-02-22

## 2019-08-21 NOTE — HISTORY OF PRESENT ILLNESS
[Last Bone Density ___] : Last bone density studies [unfilled] [Current] : cancer screening reviewed and current [Last Pap Smear ___] : last Papanicolaou cytology done [unfilled] [Last Pap ___] : Last cervical pap smear was [unfilled] [Last Mammogram ___] : last mammogram done [unfilled] [Sexually Active] : is sexually active [Monogamous] : is monogamous [Contraception] : does not use contraception

## 2019-08-21 NOTE — PHYSICAL EXAM
[Labia Majora] : labia major [Labia Minora] : labia minora [Normal] : clitoris [No Bleeding] : there was no active vaginal bleeding [Uterine Adnexae] : were not tender and not enlarged

## 2019-08-21 NOTE — HISTORY OF PRESENT ILLNESS
[Last Mammogram ___] : Last Mammogram was [unfilled] [Last Bone Density ___] : Last bone density studies [unfilled] [Last Pap ___] : Last cervical pap smear was [unfilled] [Definite:  ___ (Date)] : the last menstrual period was [unfilled] [Menarche Age: ____] : age at menarche was [unfilled] [Currently In Menopause] : currently in menopause [Sexually Active] : is sexually active [Male ___] : [unfilled] male [Contraception] : does not use contraception

## 2019-08-21 NOTE — PHYSICAL EXAM
[Normal] : uterus [No Bleeding] : there was no active vaginal bleeding [Uterine Adnexae] : were not tender and not enlarged [de-identified] : there are 2 verrucous lesions on the labia with some associated hypopigmenttion.

## 2019-08-21 NOTE — PROCEDURE
[Vulvar Biopsy] : Vulvar Biopsy [] : on the right labia majora [Sent to Histology] : the specimen was place in buffered formalin and sent for pathlogy [Punch] : punch biopsy [Silver Nitrate] : silver nitrate [Tolerated Well] : the patient tolerated the procedure well

## 2019-08-23 ENCOUNTER — APPOINTMENT (OUTPATIENT)
Dept: OBGYN | Facility: CLINIC | Age: 64
End: 2019-08-23
Payer: COMMERCIAL

## 2019-08-23 VITALS
HEIGHT: 65 IN | WEIGHT: 142 LBS | BODY MASS INDEX: 23.66 KG/M2 | SYSTOLIC BLOOD PRESSURE: 108 MMHG | DIASTOLIC BLOOD PRESSURE: 64 MMHG

## 2019-08-23 PROCEDURE — XXXXX: CPT

## 2019-08-23 NOTE — HISTORY OF PRESENT ILLNESS
[1 Year Ago] : 1 year ago [Good] : being in good health [Last Mammogram ___] : Last Mammogram was [unfilled] [Last Bone Density ___] : Last bone density studies [unfilled] [Last Pap ___] : Last cervical pap smear was [unfilled] [Postmenopausal] : is postmenopausal [Post-Menopause, No Sxs] : post-menopausal, currently without symptoms [Sexually Active] : is sexually active [Male ___] : [unfilled] male [Contraception] : does not use contraception

## 2019-08-23 NOTE — CHIEF COMPLAINT
[Urgent Visit] : Urgent Visit [FreeTextEntry1] : cramping and spotting after intercourse, pelvic pain

## 2019-08-26 LAB
BILIRUB UR QL STRIP: NORMAL
GLUCOSE UR-MCNC: NORMAL
HCG UR QL: 0.2 EU/DL
HGB UR QL STRIP.AUTO: NORMAL
KETONES UR-MCNC: NORMAL
LEUKOCYTE ESTERASE UR QL STRIP: NORMAL
NITRITE UR QL STRIP: NORMAL
PH UR STRIP: 7.5
PROT UR STRIP-MCNC: NORMAL
SP GR UR STRIP: 1.01

## 2019-08-30 ENCOUNTER — APPOINTMENT (OUTPATIENT)
Dept: OBGYN | Facility: CLINIC | Age: 64
End: 2019-08-30
Payer: COMMERCIAL

## 2019-08-30 ENCOUNTER — ASOB RESULT (OUTPATIENT)
Age: 64
End: 2019-08-30

## 2019-08-30 VITALS
WEIGHT: 145 LBS | DIASTOLIC BLOOD PRESSURE: 70 MMHG | BODY MASS INDEX: 24.16 KG/M2 | HEIGHT: 65 IN | SYSTOLIC BLOOD PRESSURE: 120 MMHG

## 2019-08-30 DIAGNOSIS — N93.0 POSTCOITAL AND CONTACT BLEEDING: ICD-10-CM

## 2019-08-30 PROCEDURE — 76830 TRANSVAGINAL US NON-OB: CPT

## 2019-08-30 PROCEDURE — 99213 OFFICE O/P EST LOW 20 MIN: CPT

## 2019-08-30 NOTE — HISTORY OF PRESENT ILLNESS
[Last Mammogram ___] : Last Mammogram was [unfilled] [Last Bone Density ___] : Last bone density studies [unfilled] [Last Pap ___] : Last cervical pap smear was [unfilled] [Postmenopausal] : is postmenopausal [Menarche Age: ____] : age at menarche was [unfilled] [Currently In Menopause] : currently in menopause [Menopause Age: ____] : age at menopause was [unfilled]

## 2019-09-01 PROBLEM — N93.0 POSTCOITAL BLEEDING: Status: ACTIVE | Noted: 2019-08-23

## 2019-09-06 ENCOUNTER — TRANSCRIPTION ENCOUNTER (OUTPATIENT)
Age: 64
End: 2019-09-06

## 2019-09-12 ENCOUNTER — APPOINTMENT (OUTPATIENT)
Dept: OBGYN | Facility: CLINIC | Age: 64
End: 2019-09-12

## 2019-10-17 ENCOUNTER — TRANSCRIPTION ENCOUNTER (OUTPATIENT)
Age: 64
End: 2019-10-17

## 2019-11-04 ENCOUNTER — APPOINTMENT (OUTPATIENT)
Dept: OBGYN | Facility: CLINIC | Age: 64
End: 2019-11-04
Payer: COMMERCIAL

## 2019-11-04 VITALS
WEIGHT: 150 LBS | HEIGHT: 65 IN | SYSTOLIC BLOOD PRESSURE: 110 MMHG | DIASTOLIC BLOOD PRESSURE: 70 MMHG | BODY MASS INDEX: 24.99 KG/M2

## 2019-11-04 DIAGNOSIS — Z87.42 PERSONAL HISTORY OF OTHER DISEASES OF THE FEMALE GENITAL TRACT: ICD-10-CM

## 2019-11-04 DIAGNOSIS — Z12.11 ENCOUNTER FOR SCREENING FOR MALIGNANT NEOPLASM OF COLON: ICD-10-CM

## 2019-11-04 DIAGNOSIS — Z80.3 FAMILY HISTORY OF MALIGNANT NEOPLASM OF BREAST: ICD-10-CM

## 2019-11-04 DIAGNOSIS — Z87.09 PERSONAL HISTORY OF OTHER DISEASES OF THE RESPIRATORY SYSTEM: ICD-10-CM

## 2019-11-04 DIAGNOSIS — E78.00 PURE HYPERCHOLESTEROLEMIA, UNSPECIFIED: ICD-10-CM

## 2019-11-04 DIAGNOSIS — N89.8 OTHER SPECIFIED NONINFLAMMATORY DISORDERS OF VAGINA: ICD-10-CM

## 2019-11-04 DIAGNOSIS — Z78.9 OTHER SPECIFIED HEALTH STATUS: ICD-10-CM

## 2019-11-04 PROCEDURE — 90686 IIV4 VACC NO PRSV 0.5 ML IM: CPT

## 2019-11-04 PROCEDURE — 90471 IMMUNIZATION ADMIN: CPT

## 2019-11-04 PROCEDURE — 82270 OCCULT BLOOD FECES: CPT

## 2019-11-04 PROCEDURE — 99396 PREV VISIT EST AGE 40-64: CPT | Mod: 25

## 2019-11-11 PROBLEM — E78.00 PURE HYPERCHOLESTEROLEMIA: Status: ACTIVE | Noted: 2019-01-11

## 2019-11-11 PROBLEM — Z78.9 SOCIAL ALCOHOL USE: Status: ACTIVE | Noted: 2018-03-08

## 2019-11-11 PROBLEM — Z87.09 HISTORY OF ASTHMA: Status: RESOLVED | Noted: 2019-01-11 | Resolved: 2019-11-11

## 2019-11-11 PROBLEM — Z87.42 HISTORY OF ENDOMETRIOSIS: Status: RESOLVED | Noted: 2019-01-11 | Resolved: 2019-11-11

## 2019-11-11 PROBLEM — Z80.3 FAMILY HISTORY OF MALIGNANT NEOPLASM OF BREAST: Status: ACTIVE | Noted: 2019-01-11

## 2019-11-11 PROBLEM — Z12.11 COLON CANCER SCREENING: Status: ACTIVE | Noted: 2018-08-17

## 2019-11-11 PROBLEM — N89.8 VAGINAL DRYNESS: Status: ACTIVE | Noted: 2019-01-11

## 2019-11-11 LAB
CYTOLOGY CVX/VAG DOC THIN PREP: NORMAL
HPV HIGH+LOW RISK DNA PNL CVX: NOT DETECTED

## 2019-11-11 NOTE — END OF VISIT
[FreeTextEntry3] : I, Charlotte Lewis, acted solely as a scribe for Dr. Cintron on this 11/04/19. \par \par All medical record entries made by the Scribe were at Dr. Cintron's direction and personally dictated by me on 11/04/19. I have reviewed the chart and agree that the record accurately reflects my personal performance of history, physical exam, assessment and plan. I have also personally directed, reviewed, and agreed with the chart.

## 2019-11-11 NOTE — PHYSICAL EXAM
[Awake] : awake [Alert] : alert [Soft] : soft [Oriented x3] : oriented to person, place, and time [Labia Majora] : labia major [Labia Minora] : labia minora [Normal] : clitoris [No Bleeding] : there was no active vaginal bleeding [Pap Obtained] : a Pap smear was performed [Uterine Adnexae] : were not tender and not enlarged [No Tenderness] : no rectal tenderness [Nl Sphincter Tone] : normal sphincter tone [RRR, No Murmurs] : RRR, no murmurs [Acute Distress] : no acute distress [Mass] : no breast mass [Nipple Discharge] : no nipple discharge [Axillary LAD] : no axillary lymphadenopathy [Tender] : non tender [Distended] : not distended [Depressed Mood] : not depressed [Dry Mucosa] : dry mucosa [Occult Blood] : occult blood test from digital rectal exam was negative

## 2019-11-11 NOTE — HISTORY OF PRESENT ILLNESS
[1 Year Ago] : 1 year ago [Last Mammogram ___] : Last Mammogram was [unfilled] [Last Bone Density ___] : Last bone density studies [unfilled] [Last Pap ___] : Last cervical pap smear was [unfilled] [Postmenopausal] : is postmenopausal [Pregnancy History] : pregnancy history: [Total Preg ___] : [unfilled] [Full Term ___] : [unfilled] [Living ___] : [unfilled] [Monogamous (Male Partner)] : is monogamous with a male partner [unknown] : the patient is unsure of the date of her LMP [Menarche Age: ____] : age at menarche was [unfilled] [Currently In Menopause] : currently in menopause [Sexually Active] : is sexually active [Monogamous] : is monogamous [Menstrual Problems] : reports normal menses [Contraception] : does not use contraception

## 2019-11-14 ENCOUNTER — TRANSCRIPTION ENCOUNTER (OUTPATIENT)
Age: 64
End: 2019-11-14

## 2020-01-29 NOTE — PROVIDER CONTACT NOTE (OTHER) - RECOMMENDATIONS
Dr. Franks
per provider
Explained risks of refusing heparin - pt states she is aware of the risks and is able to walk and move and feels she does not need heparin.
NP notified.
Rayna Goldman notified
Start IV infusion of NS bolus and continue to montior VSs.
per provider
per provider

## 2020-04-30 NOTE — SWALLOW BEDSIDE ASSESSMENT ADULT - PHARYNGEAL PHASE
Please notify patient of her lab result: NEGATIVE blood pregnancy test.    Explain that we cannot order anything except STAT/URGENT/EMERGENT tests at this time, so her other labs will need to be scheduled in May (maybe in a couple of weeks), as FASTING (lipid panel, glucose, etc.)    See the future labs that were ordered 4/28/20, and I have added on a magnesium level and a urinalysis to be done at the lab (the other one says clinic to obtain)--please attach all of these tests to her May lab appointment and THEN call to notify patient.    She has a VV for later today. She can keep it if she feels she needs to, otherwise okay to resume occasional IBU for HA and also her Rx for Fioricet.    Also, she was to sign for her records (incliding labs) to be obtained from her psychiatrist (she thinks psych) in [place] or [place]. Has this happened? I would like to compare lab results.    Within functional limits after approximately 90 cc, pt with + cough. Changed rate and volume of intake, with persistent post prandial cough. [place] thick fluids attempted thereafter, but cough from prior trial was persistent and confounded results

## 2020-05-18 ENCOUNTER — APPOINTMENT (OUTPATIENT)
Dept: OBGYN | Facility: CLINIC | Age: 65
End: 2020-05-18
Payer: COMMERCIAL

## 2020-05-18 VITALS
WEIGHT: 152 LBS | DIASTOLIC BLOOD PRESSURE: 62 MMHG | BODY MASS INDEX: 25.33 KG/M2 | HEIGHT: 65 IN | SYSTOLIC BLOOD PRESSURE: 110 MMHG

## 2020-05-18 DIAGNOSIS — N89.8 OTHER SPECIFIED NONINFLAMMATORY DISORDERS OF VAGINA: ICD-10-CM

## 2020-05-18 LAB
BILIRUB UR QL STRIP: NORMAL
CLARITY UR: NORMAL
COLLECTION METHOD: NORMAL
GLUCOSE UR-MCNC: NORMAL
HCG UR QL: 0.2 EU/DL
HGB UR QL STRIP.AUTO: ABNORMAL
KETONES UR-MCNC: NORMAL
LEUKOCYTE ESTERASE UR QL STRIP: NORMAL
NITRITE UR QL STRIP: NORMAL
PH UR STRIP: 5
PROT UR STRIP-MCNC: NORMAL
SP GR UR STRIP: 1.02

## 2020-05-18 PROCEDURE — 99213 OFFICE O/P EST LOW 20 MIN: CPT

## 2020-05-18 PROCEDURE — 81003 URINALYSIS AUTO W/O SCOPE: CPT | Mod: QW

## 2020-05-18 RX ORDER — SIMVASTATIN 20 MG/1
20 TABLET, FILM COATED ORAL
Qty: 90 | Refills: 0 | Status: DISCONTINUED | COMMUNITY
End: 2020-05-18

## 2020-05-19 LAB
APPEARANCE: CLEAR
BACTERIA: NEGATIVE
BILIRUBIN URINE: NEGATIVE
BLOOD URINE: NEGATIVE
CANDIDA VAG CYTO: NOT DETECTED
COLOR: NORMAL
G VAGINALIS+PREV SP MTYP VAG QL MICRO: NOT DETECTED
GLUCOSE QUALITATIVE U: NEGATIVE
HYALINE CASTS: 0 /LPF
KETONES URINE: NEGATIVE
LEUKOCYTE ESTERASE URINE: NEGATIVE
MICROSCOPIC-UA: NORMAL
NITRITE URINE: NEGATIVE
PH URINE: 5.5
PROTEIN URINE: NEGATIVE
RED BLOOD CELLS URINE: 3 /HPF
SPECIFIC GRAVITY URINE: 1.02
SQUAMOUS EPITHELIAL CELLS: 2 /HPF
T VAGINALIS VAG QL WET PREP: NOT DETECTED
UROBILINOGEN URINE: NORMAL
WHITE BLOOD CELLS URINE: 0 /HPF

## 2020-05-19 NOTE — HISTORY OF PRESENT ILLNESS
[Good] : being in good health [Healthy Diet] : a healthy diet [Regular Exercise] : regular exercise [Last Mammogram ___] : Last Mammogram was [unfilled] [Last Bone Density ___] : Last bone density studies [unfilled] [Last Pap ___] : Last cervical pap smear was [unfilled] [Postmenopausal] : is postmenopausal [Pregnancy History] : pregnancy history: [Total Preg ___] : [unfilled] [Full Term ___] : [unfilled] [Living ___] : [unfilled] [Monogamous (Male Partner)] : is monogamous with a male partner [Definite:  ___ (Date)] : the last menstrual period was [unfilled] [Menarche Age: ____] : age at menarche was [unfilled] [Sexually Active] : is sexually active [Monogamous] : is monogamous [Weight Concerns] : no concerns with her weight [Menstrual Problems] : reports normal menses [de-identified] : pelvic u/s 12/29/2019 [Contraception] : does not use contraception

## 2020-05-19 NOTE — PHYSICAL EXAM
[Awake] : awake [Alert] : alert [Soft] : soft [Oriented x3] : oriented to person, place, and time [No Lesions] : no genitalia lesions [Normal] : vagina [Labia Majora] : labia major [Labia Minora] : labia minora [Pink Rugae] : pink rugae [No Bleeding] : there was no active vaginal bleeding [Acute Distress] : no acute distress [Tender] : non tender [Distended] : not distended [Depressed Mood] : not depressed [Flat Affect] : affect not flat [Labia Majora Erythema] : no erythema of the labia majora [Labia Minora Erythema] : no erythema of the labia minora [Discharge] : no discharge

## 2020-05-20 ENCOUNTER — RESULT CHARGE (OUTPATIENT)
Age: 65
End: 2020-05-20

## 2020-05-20 ENCOUNTER — APPOINTMENT (OUTPATIENT)
Dept: UROLOGY | Facility: CLINIC | Age: 65
End: 2020-05-20
Payer: COMMERCIAL

## 2020-05-20 VITALS
WEIGHT: 150 LBS | SYSTOLIC BLOOD PRESSURE: 114 MMHG | DIASTOLIC BLOOD PRESSURE: 75 MMHG | TEMPERATURE: 98.7 F | HEIGHT: 65 IN | HEART RATE: 64 BPM | BODY MASS INDEX: 24.99 KG/M2

## 2020-05-20 DIAGNOSIS — R35.0 FREQUENCY OF MICTURITION: ICD-10-CM

## 2020-05-20 LAB
BILIRUB UR QL STRIP: NORMAL
CLARITY UR: CLEAR
COLLECTION METHOD: NORMAL
GLUCOSE UR-MCNC: NORMAL
HCG UR QL: 0.2 EU/DL
HGB UR QL STRIP.AUTO: NORMAL
KETONES UR-MCNC: NORMAL
LEUKOCYTE ESTERASE UR QL STRIP: NORMAL
NITRITE UR QL STRIP: NORMAL
PH UR STRIP: 6
PROT UR STRIP-MCNC: NORMAL
SP GR UR STRIP: 1.02

## 2020-05-20 PROCEDURE — 81003 URINALYSIS AUTO W/O SCOPE: CPT | Mod: QW

## 2020-05-20 PROCEDURE — 99203 OFFICE O/P NEW LOW 30 MIN: CPT | Mod: 25

## 2020-05-21 PROBLEM — R35.0 URINARY FREQUENCY: Status: ACTIVE | Noted: 2020-05-19

## 2020-05-21 NOTE — PHYSICAL EXAM
[General Appearance - Well Developed] : well developed [General Appearance - Well Nourished] : well nourished [General Appearance - In No Acute Distress] : no acute distress [] : no respiratory distress [Oriented To Time, Place, And Person] : oriented to person, place, and time [Normal Station and Gait] : the gait and station were normal for the patient's age

## 2020-05-21 NOTE — HISTORY OF PRESENT ILLNESS
[FreeTextEntry1] : Recently evaluated by GYN for UTI  :\par \par "treated for suspected UTI on 5/13/20 with Augmentin 500 mg BID when she called into the answering service with symptoms of dysuria and urinary frequency x 1 week"\par \par  [Urinary Urgency] : urinary urgency [Straining] : straining [Urinary Frequency] : urinary frequency [Dysuria] : dysuria [4] : 4 [Suprapubic] : suprapubic area [Sudden] : sudden [___ Week(s) Ago] : [unfilled] week(s) ago [Constant] : constantly [Moderate] : moderate in severity [Daily] : occur daily [Unchanged] : unchanged

## 2020-05-21 NOTE — ASSESSMENT
[FreeTextEntry1] : urine test results discussed, will obtain RBUS and urine for trichomonas. Consider cysto if still symptomatic.

## 2020-05-22 LAB
SOURCE AMPLIFICATION: NORMAL
T VAGINALIS RRNA SPEC QL NAA+PROBE: NOT DETECTED

## 2020-05-23 LAB — BACTERIA UR CULT: NORMAL

## 2020-06-09 ENCOUNTER — APPOINTMENT (OUTPATIENT)
Dept: UROLOGY | Facility: CLINIC | Age: 65
End: 2020-06-09

## 2020-06-10 ENCOUNTER — APPOINTMENT (OUTPATIENT)
Dept: UROLOGY | Facility: CLINIC | Age: 65
End: 2020-06-10

## 2020-06-17 NOTE — H&P ADULT - PROBLEM SELECTOR PLAN 7
June 17, 2020      Nellie Coreas MD  [address]  [facility] [address]           [place] [facility] Obstetrics [facility] Gynecology  [address] [place] [address]  [place] [address]  Phone: [phone]  Fax: [phone]          Patient: Vianney Callahan   MR Number: [id number]   YOB: 1985   Date of Visit: 6/17/2020       Dear Dr. Nellie Coreas:    Thank you for referring Vianney Callahan to me for evaluation. Attached you will find relevant portions of my assessment and plan of care.    If you have questions, please do not hesitate to call me. I look forward to following Vianney Callahan along with you.    Sincerely,    Morro Abbott MD    Enclosure  CC:  No Recipients    If you would like to receive this communication electronically, please contact [email or web address] or [phone] to request more information on [employer] Link access.    For providers and/or their staff who would like to refer a patient to [facility], please contact us through our one-stop-shop provider referral line, [facility], at [phone].    If you feel you have received this communication in error or would no longer like to receive these types of communications, please e-mail [email or web address]          hep c antibody ordered, f/u results

## 2020-08-25 ENCOUNTER — LABORATORY RESULT (OUTPATIENT)
Age: 65
End: 2020-08-25

## 2020-08-25 ENCOUNTER — APPOINTMENT (OUTPATIENT)
Dept: FAMILY MEDICINE | Facility: CLINIC | Age: 65
End: 2020-08-25
Payer: COMMERCIAL

## 2020-08-25 DIAGNOSIS — R35.1 NOCTURIA: ICD-10-CM

## 2020-08-25 DIAGNOSIS — R53.83 OTHER FATIGUE: ICD-10-CM

## 2020-08-25 PROCEDURE — 99214 OFFICE O/P EST MOD 30 MIN: CPT | Mod: 95

## 2020-08-25 RX ORDER — ESTRADIOL 10 UG/1
10 TABLET, FILM COATED VAGINAL
Qty: 36 | Refills: 3 | Status: COMPLETED | COMMUNITY
Start: 2019-11-04 | End: 2020-08-25

## 2020-08-25 RX ORDER — CLOTRIMAZOLE AND BETAMETHASONE DIPROPIONATE 10; .5 MG/G; MG/G
1-0.05 CREAM TOPICAL TWICE DAILY
Qty: 1 | Refills: 2 | Status: COMPLETED | COMMUNITY
Start: 2020-05-18 | End: 2020-08-25

## 2020-08-25 RX ORDER — AMOXICILLIN AND CLAVULANATE POTASSIUM 500; 125 MG/1; MG/1
500-125 TABLET, FILM COATED ORAL
Qty: 14 | Refills: 0 | Status: COMPLETED | COMMUNITY
Start: 2020-05-13 | End: 2020-08-25

## 2020-08-25 NOTE — PHYSICAL EXAM
[Well Nourished] : well nourished [Well Developed] : well developed [No Acute Distress] : no acute distress [Well-Appearing] : well-appearing [EOMI] : extraocular movements intact [Normal Outer Ear/Nose] : the outer ears and nose were normal in appearance [No Accessory Muscle Use] : no accessory muscle use [No Respiratory Distress] : no respiratory distress  [Grossly Normal Strength/Tone] : grossly normal strength/tone [Coordination Grossly Intact] : coordination grossly intact [Normal Mood] : the mood was normal [Speech Grossly Normal] : speech grossly normal [Normal Affect] : the affect was normal [Normal Insight/Judgement] : insight and judgment were intact

## 2020-08-25 NOTE — REVIEW OF SYSTEMS
[Earache] : earache [Chills] : chills [Fatigue] : fatigue [Sore Throat] : sore throat [Cough] : cough [Nocturia] : nocturia [Abdominal Pain] : abdominal pain [FreeTextEntry9] : reports body aches  [Negative] : Psychiatric

## 2020-08-25 NOTE — HISTORY OF PRESENT ILLNESS
[Home] : at home, [unfilled] , at the time of the visit. [Verbal consent obtained from patient] : the patient, [unfilled] [Medical Office: ([facility])___] : at the medical office located in  [FreeTextEntry8] : 63 yo female presents to office c nearly 1 week hx of generalized fatigue/malaise/lethargy.  Denies documented elevated temperature, however, reports chills and sweats.   Mild sore throat, mild B/L ear discomfort, and mild H/a.  Minimal  infrequent dry cough.   No known COVID- 19 contact, however, pt did have COVID-19 test performed today 8/25/20 at [facility]    \par \par Upon further questioning pt admits to mild abdominal "pressure" and increased nocturia.  Baseline 2x/night has increased to "4x/night." \par \par No known insect and/or tick bite

## 2020-08-27 ENCOUNTER — TRANSCRIPTION ENCOUNTER (OUTPATIENT)
Age: 65
End: 2020-08-27

## 2020-08-28 ENCOUNTER — TRANSCRIPTION ENCOUNTER (OUTPATIENT)
Age: 65
End: 2020-08-28

## 2020-09-09 ENCOUNTER — LABORATORY RESULT (OUTPATIENT)
Age: 65
End: 2020-09-09

## 2020-09-13 DIAGNOSIS — Z86.69 PERSONAL HISTORY OF OTHER DISEASES OF THE NERVOUS SYSTEM AND SENSE ORGANS: ICD-10-CM

## 2020-09-14 ENCOUNTER — APPOINTMENT (OUTPATIENT)
Dept: FAMILY MEDICINE | Facility: CLINIC | Age: 65
End: 2020-09-14
Payer: COMMERCIAL

## 2020-09-14 VITALS — DIASTOLIC BLOOD PRESSURE: 70 MMHG | SYSTOLIC BLOOD PRESSURE: 110 MMHG

## 2020-09-14 VITALS
HEART RATE: 60 BPM | OXYGEN SATURATION: 98 % | DIASTOLIC BLOOD PRESSURE: 70 MMHG | TEMPERATURE: 98.2 F | SYSTOLIC BLOOD PRESSURE: 122 MMHG | WEIGHT: 149 LBS | BODY MASS INDEX: 24.83 KG/M2 | HEIGHT: 65 IN

## 2020-09-14 DIAGNOSIS — R10.30 LOWER ABDOMINAL PAIN, UNSPECIFIED: ICD-10-CM

## 2020-09-14 DIAGNOSIS — R10.9 UNSPECIFIED ABDOMINAL PAIN: ICD-10-CM

## 2020-09-14 DIAGNOSIS — A69.20 LYME DISEASE, UNSPECIFIED: ICD-10-CM

## 2020-09-14 DIAGNOSIS — Z00.00 ENCOUNTER FOR GENERAL ADULT MEDICAL EXAMINATION W/OUT ABNORMAL FINDINGS: ICD-10-CM

## 2020-09-14 DIAGNOSIS — R79.89 OTHER SPECIFIED ABNORMAL FINDINGS OF BLOOD CHEMISTRY: ICD-10-CM

## 2020-09-14 DIAGNOSIS — E04.1 NONTOXIC SINGLE THYROID NODULE: ICD-10-CM

## 2020-09-14 DIAGNOSIS — Z87.898 PERSONAL HISTORY OF OTHER SPECIFIED CONDITIONS: ICD-10-CM

## 2020-09-14 DIAGNOSIS — K21.9 GASTRO-ESOPHAGEAL REFLUX DISEASE W/OUT ESOPHAGITIS: ICD-10-CM

## 2020-09-14 DIAGNOSIS — R52 PAIN, UNSPECIFIED: ICD-10-CM

## 2020-09-14 DIAGNOSIS — R53.83 OTHER FATIGUE: ICD-10-CM

## 2020-09-14 DIAGNOSIS — Z23 ENCOUNTER FOR IMMUNIZATION: ICD-10-CM

## 2020-09-14 DIAGNOSIS — Z86.59 PERSONAL HISTORY OF OTHER MENTAL AND BEHAVIORAL DISORDERS: ICD-10-CM

## 2020-09-14 PROCEDURE — 36415 COLL VENOUS BLD VENIPUNCTURE: CPT

## 2020-09-14 PROCEDURE — 99396 PREV VISIT EST AGE 40-64: CPT | Mod: 25

## 2020-09-14 PROCEDURE — 93000 ELECTROCARDIOGRAM COMPLETE: CPT | Mod: 59

## 2020-09-14 PROCEDURE — G0442 ANNUAL ALCOHOL SCREEN 15 MIN: CPT | Mod: NC

## 2020-09-14 PROCEDURE — 90662 IIV NO PRSV INCREASED AG IM: CPT

## 2020-09-14 PROCEDURE — G0008: CPT

## 2020-09-14 NOTE — HISTORY OF PRESENT ILLNESS
[FreeTextEntry1] : CPE [de-identified] : ROSETTE BRADLEY is a 64 year female here for CPE. Mood is good. Pt is active. Pt was seen by LESLIE Villatoro on 8/25/2020 for c/o flu like symptoms. Pt tested positive for lyme and was given a 3 week course of doxycycline. States she finishes abx today. Repeat lyme test was negative. Pt c.o of heart burn and eructation. Pt also c/o of superficial pain around umbilical region. States pain wakes her up from sleep. States pain is similar to past endometrial pain. Notes BM are normal.  Follows up with dermatology annually. She would like flu shot today and denies egg allergy. \par \par \par \par

## 2020-09-14 NOTE — PHYSICAL EXAM
[Well Developed] : well developed [Well Nourished] : well nourished [No Acute Distress] : no acute distress [Normal Sclera/Conjunctiva] : normal sclera/conjunctiva [Well-Appearing] : well-appearing [PERRL] : pupils equal round and reactive to light [Normal Outer Ear/Nose] : the outer ears and nose were normal in appearance [EOMI] : extraocular movements intact [Normal Oropharynx] : the oropharynx was normal [No JVD] : no jugular venous distention [No Lymphadenopathy] : no lymphadenopathy [Supple] : supple [Thyroid Normal, No Nodules] : the thyroid was normal and there were no nodules present [No Respiratory Distress] : no respiratory distress  [Clear to Auscultation] : lungs were clear to auscultation bilaterally [No Accessory Muscle Use] : no accessory muscle use [Normal Rate] : normal rate  [No Murmur] : no murmur heard [Regular Rhythm] : with a regular rhythm [Normal S1, S2] : normal S1 and S2 [No Abdominal Bruit] : a ~M bruit was not heard ~T in the abdomen [No Carotid Bruits] : no carotid bruits [No Varicosities] : no varicosities [Pedal Pulses Present] : the pedal pulses are present [No Edema] : there was no peripheral edema [No Palpable Aorta] : no palpable aorta [No Extremity Clubbing/Cyanosis] : no extremity clubbing/cyanosis [Soft] : abdomen soft [Non Tender] : non-tender [Non-distended] : non-distended [No HSM] : no HSM [Normal Bowel Sounds] : normal bowel sounds [No Masses] : no abdominal mass palpated [Normal Anterior Cervical Nodes] : no anterior cervical lymphadenopathy [Normal Posterior Cervical Nodes] : no posterior cervical lymphadenopathy [No CVA Tenderness] : no CVA  tenderness [No Spinal Tenderness] : no spinal tenderness [No Joint Swelling] : no joint swelling [Grossly Normal Strength/Tone] : grossly normal strength/tone [No Focal Deficits] : no focal deficits [Coordination Grossly Intact] : coordination grossly intact [No Rash] : no rash [Normal Gait] : normal gait [Normal Affect] : the affect was normal [Normal Insight/Judgement] : insight and judgment were intact

## 2020-09-14 NOTE — END OF VISIT
[FreeTextEntry3] : Medical record entries made by the scribe today today, were at my direction and personally dictated to them by me, Dr. Carmela Fritz on Sep 14, 2020. I have reviewed the chart and agree that the record accurately reflects my personal performance of the history, physical exam, assessment, and plan.\par

## 2020-09-14 NOTE — HEALTH RISK ASSESSMENT
[Patient reported mammogram was normal] : Patient reported mammogram was normal [Patient reported PAP Smear was normal] : Patient reported PAP Smear was normal [Patient reported bone density results were normal] : Patient reported bone density results were normal [Patient reported colonoscopy was normal] : Patient reported colonoscopy was normal [Good] : ~his/her~  mood as  good [Yes] : Yes [Never (0 pts)] : Never (0 points) [No] : In the past 12 months have you used drugs other than those required for medical reasons? No [None] : None [Significant Other] : lives with significant other [Sexually Active] : sexually active [Feels Safe at Home] : Feels safe at home [4 or more  times a week (4 pts)] : 4 or more  times a week (4 points) [3 or 4 (1 pt)] : 3 or 4  (1 point) [] : No [de-identified] : social  [Audit-CScore] : 5 [de-identified] : biking  [de-identified] : well balanced [PapSmearDate] : 11/19 [MammogramDate] : 11/19 [BoneDensityDate] : 11/18 [ColonoscopyDate] : 3/19 [ColonoscopyComments] : Dr. Griggs

## 2020-09-14 NOTE — REVIEW OF SYSTEMS
[Abdominal Pain] : abdominal pain [Heartburn] : heartburn [Negative] : Psychiatric [FreeTextEntry7] : +eructation

## 2020-09-14 NOTE — ADDENDUM
[FreeTextEntry1] : I, Mirna Leavitt acting as a scribe for Dr. Carmeal Fritz on Sep 14, 2020  at 1:24 PM\par

## 2020-09-14 NOTE — PLAN
[FreeTextEntry1] : - Blood work today\par - EKG today \par - Mammography script provided \par - DEXA script provided \par - Thyroid + Parathyroid US ordered \par - Will hold off on shingrix vaccine for now \par - Omeprazole 40 mg ordered/ If sx do not improve will send to GI \par - Monitor BP at home \par - F/U for pneumonia vaccine

## 2020-09-15 LAB
25(OH)D3 SERPL-MCNC: 46.7 NG/ML
ALBUMIN SERPL ELPH-MCNC: 4.2 G/DL
ALP BLD-CCNC: 78 U/L
ALT SERPL-CCNC: 17 U/L
ANION GAP SERPL CALC-SCNC: 15 MMOL/L
APPEARANCE: CLEAR
AST SERPL-CCNC: 20 U/L
BACTERIA: NEGATIVE
BASOPHILS # BLD AUTO: 0.03 K/UL
BASOPHILS NFR BLD AUTO: 0.7 %
BILIRUB SERPL-MCNC: 0.6 MG/DL
BILIRUBIN URINE: NEGATIVE
BLOOD URINE: NEGATIVE
BUN SERPL-MCNC: 19 MG/DL
CALCIUM SERPL-MCNC: 10.1 MG/DL
CHLORIDE SERPL-SCNC: 103 MMOL/L
CHOLEST SERPL-MCNC: 256 MG/DL
CHOLEST/HDLC SERPL: 3.3 RATIO
CO2 SERPL-SCNC: 24 MMOL/L
COLOR: NORMAL
CREAT SERPL-MCNC: 0.82 MG/DL
EOSINOPHIL # BLD AUTO: 0.08 K/UL
EOSINOPHIL NFR BLD AUTO: 1.9 %
ESTIMATED AVERAGE GLUCOSE: 105 MG/DL
GLUCOSE QUALITATIVE U: NEGATIVE
GLUCOSE SERPL-MCNC: 95 MG/DL
HBA1C MFR BLD HPLC: 5.3 %
HCT VFR BLD CALC: 40 %
HDLC SERPL-MCNC: 78 MG/DL
HGB BLD-MCNC: 12.6 G/DL
HYALINE CASTS: 1 /LPF
IMM GRANULOCYTES NFR BLD AUTO: 0.2 %
KETONES URINE: NEGATIVE
LDLC SERPL CALC-MCNC: 166 MG/DL
LEUKOCYTE ESTERASE URINE: NEGATIVE
LYMPHOCYTES # BLD AUTO: 1.57 K/UL
LYMPHOCYTES NFR BLD AUTO: 36.4 %
MAN DIFF?: NORMAL
MCHC RBC-ENTMCNC: 30.8 PG
MCHC RBC-ENTMCNC: 31.5 GM/DL
MCV RBC AUTO: 97.8 FL
MICROSCOPIC-UA: NORMAL
MONOCYTES # BLD AUTO: 0.65 K/UL
MONOCYTES NFR BLD AUTO: 15.1 %
NEUTROPHILS # BLD AUTO: 1.97 K/UL
NEUTROPHILS NFR BLD AUTO: 45.7 %
NITRITE URINE: NEGATIVE
PH URINE: 6
PLATELET # BLD AUTO: 213 K/UL
POTASSIUM SERPL-SCNC: 4.1 MMOL/L
PROT SERPL-MCNC: 6.2 G/DL
PROTEIN URINE: NEGATIVE
RBC # BLD: 4.09 M/UL
RBC # FLD: 13.4 %
RED BLOOD CELLS URINE: 1 /HPF
SARS-COV-2 IGG SERPL IA-ACNC: <0.1 INDEX
SARS-COV-2 IGG SERPL QL IA: NEGATIVE
SODIUM SERPL-SCNC: 142 MMOL/L
SPECIFIC GRAVITY URINE: 1.02
SQUAMOUS EPITHELIAL CELLS: 2 /HPF
TRIGL SERPL-MCNC: 59 MG/DL
TSH SERPL-ACNC: 1.35 UIU/ML
URATE SERPL-MCNC: 3.7 MG/DL
UROBILINOGEN URINE: NORMAL
WBC # FLD AUTO: 4.31 K/UL
WHITE BLOOD CELLS URINE: 1 /HPF

## 2020-11-25 ENCOUNTER — TRANSCRIPTION ENCOUNTER (OUTPATIENT)
Age: 65
End: 2020-11-25

## 2020-12-14 ENCOUNTER — APPOINTMENT (OUTPATIENT)
Dept: FAMILY MEDICINE | Facility: CLINIC | Age: 65
End: 2020-12-14
Payer: MEDICARE

## 2020-12-14 ENCOUNTER — APPOINTMENT (OUTPATIENT)
Dept: GASTROENTEROLOGY | Facility: CLINIC | Age: 65
End: 2020-12-14

## 2020-12-14 ENCOUNTER — APPOINTMENT (OUTPATIENT)
Dept: OBGYN | Facility: CLINIC | Age: 65
End: 2020-12-14
Payer: MEDICARE

## 2020-12-14 VITALS
BODY MASS INDEX: 25.92 KG/M2 | WEIGHT: 155.6 LBS | DIASTOLIC BLOOD PRESSURE: 70 MMHG | SYSTOLIC BLOOD PRESSURE: 110 MMHG | HEIGHT: 65 IN

## 2020-12-14 DIAGNOSIS — Z23 ENCOUNTER FOR IMMUNIZATION: ICD-10-CM

## 2020-12-14 DIAGNOSIS — Z63.5 DISRUPTION OF FAMILY BY SEPARATION AND DIVORCE: ICD-10-CM

## 2020-12-14 PROCEDURE — G0009: CPT

## 2020-12-14 PROCEDURE — 99397 PER PM REEVAL EST PAT 65+ YR: CPT | Mod: GY

## 2020-12-14 PROCEDURE — 90732 PPSV23 VACC 2 YRS+ SUBQ/IM: CPT

## 2020-12-14 PROCEDURE — G0101: CPT

## 2020-12-14 RX ORDER — DOXYCYCLINE HYCLATE 100 MG/1
100 CAPSULE ORAL
Qty: 28 | Refills: 0 | Status: DISCONTINUED | COMMUNITY
Start: 2020-08-25 | End: 2020-12-14

## 2020-12-14 RX ORDER — DOXYCYCLINE HYCLATE 100 MG/1
100 CAPSULE ORAL TWICE DAILY
Qty: 14 | Refills: 0 | Status: DISCONTINUED | COMMUNITY
Start: 2020-09-02 | End: 2020-12-14

## 2020-12-14 SDOH — SOCIAL STABILITY - SOCIAL INSECURITY: DISRUPTION OF FAMILY BY SEPARATION AND DIVORCE: Z63.5

## 2020-12-14 NOTE — PHYSICAL EXAM
[Appropriately responsive] : appropriately responsive [Alert] : alert [No Acute Distress] : no acute distress [No Lymphadenopathy] : no lymphadenopathy [No Murmurs] : no murmurs [Soft] : soft [Non-tender] : non-tender [Non-distended] : non-distended [No HSM] : No HSM [No Lesions] : no lesions [No Mass] : no mass [Oriented x3] : oriented x3 [FreeTextEntry3] : thyroid mobile, no masses [FreeTextEntry6] : no LAD [Examination Of The Breasts] : a normal appearance [No Masses] : no breast masses were palpable [Labia Majora] : normal [Labia Minora] : normal [Atrophy] : atrophy [Normal] : normal [Uterine Adnexae] : normal

## 2020-12-14 NOTE — DISCUSSION/SUMMARY
[FreeTextEntry1] : 64 y/o\par gyn annual\par -denies hx of abnormal pap over the last 20 yrs\par -pap, HPV today\par -discussed ASCCP guidelines to stop pap after 65; still requires annual breast and pelvic exam\par -con't care w/ PCP\par -rto 1 yr gyn annual

## 2020-12-14 NOTE — HISTORY OF PRESENT ILLNESS
[LMP unknown] : LMP unknown [unknown] : Patient is unsure of the date of her LMP [postmenopausal] : postmenopausal [N] : Patient does not use contraception [Y] : Patient is sexually active [Menarche Age: ____] : age at menarche was [unfilled] [Currently In Menopause] : currently in menopause [Menopause Age: ____] : age at menopause was [unfilled] [Currently Active] : currently active [Men] : men [No] : No [Patient refuses STI testing] : Patient refuses STI testing [FreeTextEntry1] : 64 y/o presents for gyn annual. no complaints. [Mammogramdate] : 11/19/2020 [TextBox_19] : BR 1 [PapSmeardate] : 11/04/2019 [TextBox_31] : NEG, neg HPV [BoneDensityDate] : 11/19/2020 [TextBox_37] : OSTEOPENIA; managed by pcp [HPVDate] : 11/04/2019 [TextBox_78] : NEG [PGHxTotal] : 4 [BannerxFullTerm] : 4 [[facility]] : 4

## 2020-12-16 LAB — HPV HIGH+LOW RISK DNA PNL CVX: NOT DETECTED

## 2020-12-18 ENCOUNTER — RX RENEWAL (OUTPATIENT)
Age: 65
End: 2020-12-18

## 2020-12-20 LAB — CYTOLOGY CVX/VAG DOC THIN PREP: NORMAL

## 2020-12-31 ENCOUNTER — LABORATORY RESULT (OUTPATIENT)
Age: 65
End: 2020-12-31

## 2021-07-23 NOTE — PROGRESS NOTE ADULT - ASSESSMENT
SUBJECTIVE:   CC: Marce Hamilton is an 55 year old woman who presents for preventive health visit.     Patient has been advised of split billing requirements and indicates understanding: Yes     0    Healthy Habits:  Answers for HPI/ROS submitted by the patient on 7/27/2021  Frequency of exercise:: 2-3 days/week  Getting at least 3 servings of Calcium per day:: NO  Diet:: Regular (no restrictions)  Taking medications regularly:: Yes  Medication side effects:: None  Bi-annual eye exam:: Yes  Dental care twice a year:: Yes  Sleep apnea or symptoms of sleep apnea:: None  Additional concerns today:: No  Duration of exercise:: 15-30 minutes      Today's PHQ-2 Score:   PHQ-2 ( 1999 Pfizer) 7/27/2021 5/24/2019   Q1: Little interest or pleasure in doing things 0 0   Q2: Feeling down, depressed or hopeless 0 1   PHQ-2 Score 0 1   Q1: Little interest or pleasure in doing things Not at all -   Q2: Feeling down, depressed or hopeless Not at all -   PHQ-2 Score 0 -       Abuse: Current or Past(Physical, Sexual or Emotional)- No  Do you feel safe in your environment? Yes    Have you ever done Advance Care Planning? (For example, a Health Directive, POLST, or a discussion with a medical provider or your loved ones about your wishes): No, advance care planning information given to patient to review.  Patient plans to discuss their wishes with loved ones or provider.      Social History     Tobacco Use     Smoking status: Never Smoker     Smokeless tobacco: Never Used   Substance Use Topics     Alcohol use: Yes     Comment: few on a weekend     If you drink alcohol do you typically have >3 drinks per day or >7 drinks per week? No                     Reviewed orders with patient.  Reviewed health maintenance and updated orders accordingly - Yes    Cancer Screenings:  Colon - UTD, due 2028  Cervical - 2023 due.  IUD in place due to come out 8/2021.  Breast - UTD, mammo today  Lung - NA  Prostate - NA  Skin - No personal or family  history of melanoma, sun block recommended.      Immunizations:  Tdap - 2012  Zoster - Discussed, get through pharmacy.   Influenza - Out of season  Prevnar - NA  Pneumovax - NA  COVID - Pfizer completed      Cardiovascular:  Fasting glucose/Hgb A1C: 91 in 2019  Cholesterol: LDL 93 2019  ASCVD score:     The 10-year ASCVD risk score (Tray BRIAN Jr., et al., 2013) is: 1.3%    Values used to calculate the score:      Age: 56 years      Sex: Female      Is Non- : No      Diabetic: No      Tobacco smoker: No      Systolic Blood Pressure: 111 mmHg      Is BP treated: No      HDL Cholesterol: 73 mg/dL      Total Cholesterol: 203 mg/dL     Other:  Osteoporosis - No family history.   --Vitamin D - 60 in 2015.    FHS-7:   Breast CA Risk Assessment (FHS-7) 7/27/2021 7/28/2021   Did any of your first-degree relatives have breast or ovarian cancer? Yes Yes   Did any of your relatives have bilateral breast cancer? Unknown -   Did any man in your family have breast cancer? No -   Did any woman in your family have breast and ovarian cancer? Yes -   Did any woman in your family have breast cancer before age 50 y? No -   Do you have 2 or more relatives with breast and/or ovarian cancer? No -   Do you have 2 or more relatives with breast and/or bowel cancer? No -     Pertinent mammograms are reviewed under the imaging tab.    Pertinent mammograms are reviewed under the imaging tab.  History of abnormal Pap smear: NO - age 30-65 PAP every 5 years with negative HPV co-testing recommended  PAP / HPV Latest Ref Rng & Units 4/23/2018   PAP (Historical) - NIL   HPV16 NEG:Negative Negative   HPV18 NEG:Negative Negative   HRHPV NEG:Negative Negative     Reviewed and updated as needed this visit by clinical staff  Tobacco  Allergies  Meds  Problems  Med Hx  Surg Hx  Fam Hx  Soc Hx          Reviewed and updated as needed this visit by Provider  Tobacco  Allergies  Meds  Problems  Med Hx  Surg Hx  Fam Hx          ROS:  CONSTITUTIONAL: NEGATIVE for fever, chills, change in weight  INTEGUMENTARY/SKIN: NEGATIVE for worrisome rashes, moles or lesions  EYES: NEGATIVE for vision changes or irritation  ENT: NEGATIVE for ear, mouth and throat problems  RESP: NEGATIVE for significant cough or SOB  BREAST: NEGATIVE for masses, tenderness or discharge  CV: NEGATIVE for chest pain, palpitations or peripheral edema  GI: NEGATIVE for nausea, abdominal pain, heartburn, or change in bowel habits  : NEGATIVE for unusual urinary or vaginal symptoms. No vaginal bleeding.  MUSCULOSKELETAL: NEGATIVE for significant arthralgias or myalgia  NEURO: NEGATIVE for weakness, dizziness or paresthesias  PSYCHIATRIC: NEGATIVE for changes in mood or affect     OBJECTIVE:   /64 (BP Location: Left arm, Patient Position: Chair, Cuff Size: Adult Regular)   Pulse 70   Temp 99  F (37.2  C) (Tympanic)   Resp 18   Wt 78 kg (172 lb)   SpO2 95%   BMI 29.07 kg/m    EXAM:   GENERAL: healthy, alert and no distress  EYES: Eyes grossly normal to inspection, PERRL and conjunctivae and sclerae normal  HENT: ear canals and TM's normal, nose and mouth without ulcers or lesions  NECK: no adenopathy, no asymmetry, masses, or scars and thyroid normal to palpation  RESP: lungs clear to auscultation - no rales, rhonchi or wheezes  CV: regular rates and rhythm, normal S1 S2, no S3 or S4, no murmur, click or rub and no peripheral edema  ABDOMEN: soft, nontender, no hepatosplenomegaly, no masses and bowel sounds normal   (female): normal female external genitalia, normal urethral meatus, vaginal mucosa, normal cervix/adnexa/uterus without masses or discharge. IUD strings easily visualized.   MS: no gross musculoskeletal defects noted, no edema  SKIN: no suspicious lesions or rashes  NEURO: Normal strength and tone, mentation intact and speech normal  PSYCH: mentation appears normal, affect normal/bright  LYMPH: no cervical, supraclavicular, axillary, or inguinal  63 yo woman who presented with decreased relfexes, paresthesias of the hands and feet and mild weakness c/w with AIDP. Having been treated with 5 day course of IVIG, today is the last dose. She has clinically improved. She is medically cleared for discharge. adenopathy    Diagnostic Test Results:  Labs reviewed in Epic  Results for orders placed or performed in visit on 07/28/21   MA Screen Bilateral w/Frankie     Status: None    Narrative    BILATERAL FULL FIELD DIGITAL SCREENING MAMMOGRAM WITH TOMOSYNTHESIS    Performed on: 7/28/21    Compared to: 06/03/2019 MA Screen Bilateral w/Frankie, 04/30/2018 US Breast   Left Limited 1-3 Quadrants, 04/26/2018 MA Screen Bilateral w/Frankie,   02/21/2017 MA Screen Bilateral w/Frankie, 02/19/2016 MA Screen Bilateral   w/Frankie, 02/16/2015 MA SCREENING DIGITAL BILATERAL ([place]), and   08/25/2014 US Breast Left    Findings: The breasts are heterogeneously dense, which may obscure small   masses. This study was evaluated with the assistance of Computer-Aided   Detection.  Breast Tomosynthesis was used in interpretation.  There are   benign findings, not significantly changed of both breasts.  There is no radiographic evidence of malignancy.     IMPRESSION: ACR BI-RADS Category 2: Benign    RECOMMENDED FOLLOW-UP: Annual routine screening mammogram    The results and recommendations of this examination will be communicated   to the patient.       PROCEDURE: IUD removal  IUD strings easily visualized. Strings grasped with ring forceps. Unfortunately unable to remove the IUD after 3 attempts and single string was cut in the process.     Contacted Dr. Wilkinson (Ob/Gyn) who was gracious enough to assist with removal. He was able to grasp the IUD itself through the cervix. IUD removed intact with out complication.     ASSESSMENT/PLAN:   Routine general medical examination at a health care facility  See HCM    Closed fracture of right hip with routine healing, subsequent encounter  DEXA ordered due to recent hip fracture. No known hx of osteoporosis or other risk factors.   - DX Hip/Pelvis/Spine; Future    Adjustment disorder with depressed mood  Improved. Pt may consider taper to 25mg of the zoloft and then stop if tolerating lowered dose x 1 mo.  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 Continue ppi    Encounter for IUD removal / Abdominal cramp  See procedure note. Some abd cramping post procedure. Treated with oral ibuprofen 800mg single dose prior to discharge. No further complication noted.   - ibuprofen (ADVIL/MOTRIN) tablet 200 mg    Patient has been advised of split billing requirements and indicates understanding: Yes  COUNSELING:   Reviewed preventive health counseling, as reflected in patient instructions    Estimated body mass index is 29.07 kg/m  as calculated from the following:    Height as of 2/13/20: 1.638 m (5' 4.5\").    Weight as of this encounter: 78 kg (172 lb).    Weight management plan: Patient referred to endocrine and/or weight management specialty    She reports that she has never smoked. She has never used smokeless tobacco.    Counseling Resources:  ATP IV Guidelines  Pooled Cohorts Equation Calculator  Breast Cancer Risk Calculator  BRCA-Related Cancer Risk Assessment: FHS-7 Tool  FRAX Risk Assessment  ICSI Preventive Guidelines  Dietary Guidelines for Americans, 2010  USDA's MyPlate  ASA Prophylaxis  Lung CA Screening    Valerie Quinones MD  [facility] [place]  "

## 2021-09-07 ENCOUNTER — NON-APPOINTMENT (OUTPATIENT)
Age: 66
End: 2021-09-07

## 2021-09-08 ENCOUNTER — NON-APPOINTMENT (OUTPATIENT)
Age: 66
End: 2021-09-08

## 2021-09-08 ENCOUNTER — APPOINTMENT (OUTPATIENT)
Dept: OBGYN | Facility: CLINIC | Age: 66
End: 2021-09-08
Payer: MEDICARE

## 2021-09-08 VITALS
HEIGHT: 65 IN | BODY MASS INDEX: 25.99 KG/M2 | SYSTOLIC BLOOD PRESSURE: 110 MMHG | WEIGHT: 156 LBS | DIASTOLIC BLOOD PRESSURE: 62 MMHG

## 2021-09-08 DIAGNOSIS — N63.20 UNSPECIFIED LUMP IN THE LEFT BREAST, UNSPECIFIED QUADRANT: ICD-10-CM

## 2021-09-08 DIAGNOSIS — N64.4 MASTODYNIA: ICD-10-CM

## 2021-09-08 PROCEDURE — 99213 OFFICE O/P EST LOW 20 MIN: CPT

## 2021-09-08 NOTE — PHYSICAL EXAM
[Appropriately responsive] : appropriately responsive [Alert] : alert [Oriented x3] : oriented x3 [No Acute Distress] : no acute distress [Examination Of The Breasts] : a normal appearance [Normal] : normal [No Discharge] : no discharge [Tenderness Of The Left Breast] : tenderness [No Masses] : no breast masses were palpable

## 2021-09-08 NOTE — HISTORY OF PRESENT ILLNESS
[LMP unknown] : LMP unknown [postmenopausal] : postmenopausal [N] : Patient does not use contraception [Y] : Positive pregnancy history [unknown] : Patient is unsure of the date of her LMP [Menarche Age: ____] : age at menarche was [unfilled] [Previously active] : previously active [Men] : men [Vaginal] : vaginal [No] : No [Patient refuses STI testing] : Patient refuses STI testing [FreeTextEntry1] : \par Patsy presents today for urgent visit.\par \anita Reports she has been having intermittent left breast shooting pain x5 weeks. She also felt a small lump on her lower left breast 2 nights ago 09/06/21. \par \par She recently started doing bilateral arm exercises 5-6 weeks ago and pain was noticed on left breast only.\par \par She denies breast pain today.\par \par Her last mammo was 11/19/2020 Bi-Rads 1-Negative and 11/18/2019 mammo was Bi-Rads 1 Negative. \par \par She has family hx of maternal aunt and paternal cousin of breast cancer. [Mammogramdate] : 11/19/20 [TextBox_19] : br1 [PapSmeardate] : 12/14/20 [TextBox_31] : neg [BoneDensityDate] : 11/19/20 [TextBox_37] : osteopenia [HPVDate] : 12/14/20 [TextBox_78] : neg [PGHxTotal] : 4 [[facility]] : 4 [[facility]] : 4

## 2021-09-08 NOTE — DISCUSSION/SUMMARY
[FreeTextEntry1] : \par We discussed the benign findings on physical exam- patient reports left breast lump in lower middle left breast- reviewed no lump felt on exam, patient also could not feel lump during breast exam in office today, area shown by patient where she felt it was in lower middle left breast, and not felt on exam today. \par \par Diagnostic Left Breast mammo and Left breast Sono script given today.\par \par All questions and concerns were addressed.\par \par RTO in 2-4 weeks for left breast pain followup and prn.

## 2021-09-15 ENCOUNTER — RESULT REVIEW (OUTPATIENT)
Age: 66
End: 2021-09-15

## 2021-09-15 ENCOUNTER — OUTPATIENT (OUTPATIENT)
Dept: OUTPATIENT SERVICES | Facility: HOSPITAL | Age: 66
LOS: 1 days | End: 2021-09-15
Payer: MEDICARE

## 2021-09-15 ENCOUNTER — APPOINTMENT (OUTPATIENT)
Dept: MAMMOGRAPHY | Facility: CLINIC | Age: 66
End: 2021-09-15
Payer: MEDICARE

## 2021-09-15 ENCOUNTER — APPOINTMENT (OUTPATIENT)
Dept: ULTRASOUND IMAGING | Facility: CLINIC | Age: 66
End: 2021-09-15
Payer: MEDICARE

## 2021-09-15 DIAGNOSIS — N64.4 MASTODYNIA: ICD-10-CM

## 2021-09-15 DIAGNOSIS — Z98.890 OTHER SPECIFIED POSTPROCEDURAL STATES: Chronic | ICD-10-CM

## 2021-09-15 PROCEDURE — 76641 ULTRASOUND BREAST COMPLETE: CPT | Mod: 26,LT

## 2021-09-15 PROCEDURE — G0279: CPT | Mod: 26

## 2021-09-15 PROCEDURE — 77065 DX MAMMO INCL CAD UNI: CPT

## 2021-09-15 PROCEDURE — 77065 DX MAMMO INCL CAD UNI: CPT | Mod: 26,LT

## 2021-09-15 PROCEDURE — G0279: CPT

## 2021-09-15 PROCEDURE — 76641 ULTRASOUND BREAST COMPLETE: CPT

## 2021-10-15 ENCOUNTER — APPOINTMENT (OUTPATIENT)
Dept: OBGYN | Facility: CLINIC | Age: 66
End: 2021-10-15

## 2021-10-29 ENCOUNTER — APPOINTMENT (OUTPATIENT)
Dept: OBGYN | Facility: CLINIC | Age: 66
End: 2021-10-29

## 2022-01-03 ENCOUNTER — APPOINTMENT (OUTPATIENT)
Dept: OBGYN | Facility: CLINIC | Age: 67
End: 2022-01-03

## 2022-01-24 ENCOUNTER — APPOINTMENT (OUTPATIENT)
Dept: OBGYN | Facility: CLINIC | Age: 67
End: 2022-01-24

## 2022-01-24 VITALS
BODY MASS INDEX: 23.82 KG/M2 | WEIGHT: 143 LBS | SYSTOLIC BLOOD PRESSURE: 102 MMHG | DIASTOLIC BLOOD PRESSURE: 70 MMHG | HEIGHT: 65 IN

## 2022-01-24 DIAGNOSIS — Z11.51 ENCOUNTER FOR SCREENING FOR HUMAN PAPILLOMAVIRUS (HPV): ICD-10-CM

## 2022-01-24 DIAGNOSIS — N60.19 DIFFUSE CYSTIC MASTOPATHY OF UNSPECIFIED BREAST: ICD-10-CM

## 2022-01-24 DIAGNOSIS — Z78.0 ASYMPTOMATIC MENOPAUSAL STATE: ICD-10-CM

## 2022-01-24 PROCEDURE — 99397 PER PM REEVAL EST PAT 65+ YR: CPT | Mod: GY

## 2022-01-24 PROCEDURE — G0101: CPT

## 2022-02-02 LAB
CYTOLOGY CVX/VAG DOC THIN PREP: ABNORMAL
HPV HIGH+LOW RISK DNA PNL CVX: NOT DETECTED

## 2022-04-15 ENCOUNTER — APPOINTMENT (OUTPATIENT)
Dept: ORTHOPEDIC SURGERY | Facility: CLINIC | Age: 67
End: 2022-04-15
Payer: MEDICARE

## 2022-04-15 ENCOUNTER — APPOINTMENT (OUTPATIENT)
Dept: ULTRASOUND IMAGING | Facility: CLINIC | Age: 67
End: 2022-04-15
Payer: MEDICARE

## 2022-04-15 ENCOUNTER — OUTPATIENT (OUTPATIENT)
Dept: OUTPATIENT SERVICES | Facility: HOSPITAL | Age: 67
LOS: 1 days | End: 2022-04-15
Payer: MEDICARE

## 2022-04-15 ENCOUNTER — RESULT REVIEW (OUTPATIENT)
Age: 67
End: 2022-04-15

## 2022-04-15 ENCOUNTER — APPOINTMENT (OUTPATIENT)
Dept: MAMMOGRAPHY | Facility: CLINIC | Age: 67
End: 2022-04-15
Payer: MEDICARE

## 2022-04-15 VITALS — BODY MASS INDEX: 22.66 KG/M2 | HEIGHT: 65 IN | WEIGHT: 136 LBS

## 2022-04-15 DIAGNOSIS — Z00.8 ENCOUNTER FOR OTHER GENERAL EXAMINATION: ICD-10-CM

## 2022-04-15 DIAGNOSIS — Z98.890 OTHER SPECIFIED POSTPROCEDURAL STATES: Chronic | ICD-10-CM

## 2022-04-15 DIAGNOSIS — M48.02 SPINAL STENOSIS, CERVICAL REGION: ICD-10-CM

## 2022-04-15 PROCEDURE — 76641 ULTRASOUND BREAST COMPLETE: CPT | Mod: 26,50

## 2022-04-15 PROCEDURE — 77063 BREAST TOMOSYNTHESIS BI: CPT | Mod: 26

## 2022-04-15 PROCEDURE — 77063 BREAST TOMOSYNTHESIS BI: CPT

## 2022-04-15 PROCEDURE — 76641 ULTRASOUND BREAST COMPLETE: CPT

## 2022-04-15 PROCEDURE — 77067 SCR MAMMO BI INCL CAD: CPT | Mod: 26

## 2022-04-15 PROCEDURE — 77067 SCR MAMMO BI INCL CAD: CPT

## 2022-04-15 PROCEDURE — 72050 X-RAY EXAM NECK SPINE 4/5VWS: CPT

## 2022-04-15 PROCEDURE — 99214 OFFICE O/P EST MOD 30 MIN: CPT

## 2022-04-15 NOTE — PHYSICAL EXAM
[NL (45)] : right lateral flexion 45 degrees [de-identified] : Constitutional:\par -  General Appearance: \par Unremarkable\par Body Habitus\par Well Developed \par Well Nourished\par Body Habitus\par No Deformities\par Well Groomed\par \par Ability To communicate:\par Normal\par \par Neurologic: \par Global sensation is intact to upper and lower extremities.  See examination of Neck and/or Spine for exceptions.\par Orientation to Time, Place and Person is: Normal\par Mood And Affect is Normal\par \par Skin:\par -  Head/Face, Right Upper/Lower Extremity, Left Upper/Lower Extremity: Normal\par See Examination of Neck and/or Spine for exceptions\par \par Cardiovascular:\par Peripheral Cardiovascular System is Normal\par \par Palpation of Lymph Nodes:\par Normal Palpation of lymph nodes in: Axilla, Cervical, Inguinal\par Abnormal Palpation of lymph nodes in: None [FreeTextEntry9] : 15 degrees extension\par 70 degrees rotation bilaterally [de-identified] : Neurological Testing for the cervical spine is as follows:\par - Light Touch is intact throughout both upper extremities\par - Normal deep tendon reflexes bilateral upper extremities\par - Negative Spurling Test\par - Negative Simental Reflex\par - Deep Tendon Reflexes LEFT: Biceps (2+) | Triceps (2+) | Brachioradialis (2+)\par - Deep Tendon Reflexes RIGHT: Biceps (2+) | Triceps (2+) | Brachioradialis (2+) [TWNoteComboBox7] : forward flexion 30 degrees [] : clonus not sustained at ankle [FreeTextEntry8] : medial cervical spin tenderness radiating into the upper thoracic spine

## 2022-04-15 NOTE — ASSESSMENT
[FreeTextEntry1] : 67 y/o female with cervical stenosis and early myelopathy.  She is now having progressive pain in the upper T spine from this.  No clear signs of wynn or hyperreflexia on exam.. I am prescribing the patient MDP for pain relief. Titration schedule provided. As the patient has been experiencing worsening pains, and as her last MRI was 1 year ago, I am requesting a repeat Cervical Spine non-contrast MRI to evaluate the progression of the abutment on her spinal cord. I would like to follow up with the patient in 2-3 weeks to assess the results of her MRI. Possible ACDF pending results.

## 2022-04-15 NOTE — HISTORY OF PRESENT ILLNESS
[Gradual] : gradual [8] : 8 [Dull/Aching] : dull/aching [Intermittent] : intermittent [Household chores] : household chores [Rest] : rest [Retired] : Work status: retired [de-identified] : 65 y/o female presents for a follow up evaluation. Patient was last seen by me in October. Patient reports that two months ago she began to experience worsening tamar -scapula pain. Patient denies any further radiation of pain. Patient c/o paresthesias in her bilaterally fingers.\par  [] : no [FreeTextEntry1] : CERVICAL SPINE  [FreeTextEntry3] : 2020 [FreeTextEntry5] : NONE  [FreeTextEntry7] : BILATERAL HANDS  [de-identified] : NONE

## 2022-04-29 ENCOUNTER — APPOINTMENT (OUTPATIENT)
Dept: ORTHOPEDIC SURGERY | Facility: CLINIC | Age: 67
End: 2022-04-29
Payer: MEDICARE

## 2022-04-29 VITALS — WEIGHT: 136 LBS | BODY MASS INDEX: 22.66 KG/M2 | HEIGHT: 65 IN

## 2022-04-29 DIAGNOSIS — G95.9 DISEASE OF SPINAL CORD, UNSPECIFIED: ICD-10-CM

## 2022-04-29 PROCEDURE — 99214 OFFICE O/P EST MOD 30 MIN: CPT

## 2022-04-29 NOTE — ASSESSMENT
[FreeTextEntry1] : 65 yo female presenting with cervical pain. Patient will receive Cervical CT scan to evaluate for calcification of cervical disks and PLL which could alter the surgical plan.. Discussed with the patient she is a candidate for C4-7 ACDF surgery. Follow up 1 week to review CT scan and further discuss surgery details.\par \par \par 4/15/22 MRI [facility] Cervical Spine: \par C2-3: normal\par C3-4 moderate DD, mild central stenosis\par C4-5 moderate to advanced DDD, severe central stenosis with mild cord compression\par C5-6 advanced DDD focal central protrusion, possible calcification of HNP with severe compression of spinal cord, severe stenosis\par C6-7 advanced DDD; mild compression of spinal cord with severe compression of spinal cord\par C7-T1 mild DDD

## 2022-04-29 NOTE — PHYSICAL EXAM
[de-identified] : Constitutional:\par -  General Appearance: \par Unremarkable\par Body Habitus\par Well Developed \par Well Nourished\par Body Habitus\par No Deformities\par Well Groomed\par \par Ability To communicate:\par Normal\par \par Neurologic: \par Global sensation is intact to upper and lower extremities.  See examination of Neck and/or Spine for exceptions.\par Orientation to Time, Place and Person is: Normal\par Mood And Affect is Normal\par \par Skin:\par -  Head/Face, Right Upper/Lower Extremity, Left Upper/Lower Extremity: Normal\par See Examination of Neck and/or Spine for exceptions\par \par Cardiovascular:\par Peripheral Cardiovascular System is Normal\par \par Palpation of Lymph Nodes:\par Normal Palpation of lymph nodes in: Axilla, Cervical, Inguinal\par Abnormal Palpation of lymph nodes in: None [NL (45)] : right lateral flexion 45 degrees [] : motor exam is non-focal throughout both upper extremities [FreeTextEntry9] : 15 degrees extension\par 70 degrees rotation bilaterally [de-identified] : Neurological Testing for the cervical spine is as follows:\par - Light Touch is intact throughout both upper extremities\par - Normal deep tendon reflexes bilateral upper extremities\par - Negative Spurling Test\par - Negative Simental Reflex\par - Deep Tendon Reflexes LEFT: Biceps (2+) | Triceps (2+) | Brachioradialis (2+)\par - Deep Tendon Reflexes RIGHT: Biceps (2+) | Triceps (2+) | Brachioradialis (2+) [TWNoteComboBox7] : forward flexion 30 degrees

## 2022-04-29 NOTE — HISTORY OF PRESENT ILLNESS
[6] : 6 [Retired] : Work status: retired [de-identified] : 65 yo female presenting with cervical pain. She admits to radiating pain and numbness to her b/l lower extremities to feet. She has been experiencing cervical headaches as well. Denies feelings of instability.  [FreeTextEntry1] : C-spine  [de-identified] : None  [de-identified] : nm

## 2022-05-05 ENCOUNTER — RESULT REVIEW (OUTPATIENT)
Age: 67
End: 2022-05-05

## 2022-05-09 ENCOUNTER — APPOINTMENT (OUTPATIENT)
Dept: SPINE | Facility: CLINIC | Age: 67
End: 2022-05-09
Payer: MEDICARE

## 2022-05-09 VITALS
WEIGHT: 136 LBS | HEIGHT: 65 IN | OXYGEN SATURATION: 100 % | HEART RATE: 71 BPM | BODY MASS INDEX: 22.66 KG/M2 | SYSTOLIC BLOOD PRESSURE: 127 MMHG | DIASTOLIC BLOOD PRESSURE: 84 MMHG

## 2022-05-09 DIAGNOSIS — M48.00 SPINAL STENOSIS, SITE UNSPECIFIED: ICD-10-CM

## 2022-05-09 PROCEDURE — 99203 OFFICE O/P NEW LOW 30 MIN: CPT

## 2022-05-09 RX ORDER — CYCLOBENZAPRINE HYDROCHLORIDE 10 MG/1
10 TABLET, FILM COATED ORAL 3 TIMES DAILY
Qty: 60 | Refills: 0 | Status: DISCONTINUED | COMMUNITY
Start: 2022-04-15 | End: 2022-05-09

## 2022-05-09 RX ORDER — METHYLPREDNISOLONE 4 MG/1
4 TABLET ORAL
Qty: 1 | Refills: 0 | Status: DISCONTINUED | COMMUNITY
Start: 2022-04-15 | End: 2022-05-09

## 2022-05-09 RX ORDER — OMEPRAZOLE 40 MG/1
40 CAPSULE, DELAYED RELEASE ORAL
Qty: 90 | Refills: 0 | Status: DISCONTINUED | COMMUNITY
Start: 2020-09-14 | End: 2022-05-09

## 2022-05-10 NOTE — HISTORY OF PRESENT ILLNESS
[> 3 months] : more  than 3 months [FreeTextEntry1] : Neck pain  [de-identified] : Ms. Cantor is a 66 year old lady here today for a neurological evaluation. H/o guillain barre syndrome and GERD. She presents today with neck pain for 4 months. Denies any falls or trauma Pain radiates to back of the head, down between scapula and left breast. She describes the pain to the back of head as a pressure. She has intermittent numbness and tingling of hands which resolves.  Denies any balance issues, fine motor skill issues, weakness of extremities, urinary or bowel incontinence. Ambulates independently without difficulty. Takes gabapentin which is beneficial. She is doing acupuncture and yoga which is beneficial. No physical therapy, chiropractor, or pain management initiated.  Recent MRI shows C4-C7 spinal stenosis with cord impingement. There is no T2 signal change in the cord. Her only symptom would be numbness in the hands when waking up which resolved shortly thereafter. She does not describe any functionality problems with her hands OR balance problems with her legs.

## 2022-05-10 NOTE — DATA REVIEWED
[de-identified] : Cervical from [facility] on 5/5/2022 [de-identified] : Cervical from [facility] on 4/15/2022 Cervical spine from [facility] on 3/9/2018

## 2022-05-10 NOTE — ASSESSMENT
[FreeTextEntry1] : 66 year old with significant spinal stenosis. No neurological symptoms referable to her cervical spinal stenosis.  Normal neurological exam.  She will return in 4 months for reevaluation. She will return sooner if she begins to have neurological impairments. I evaluated and examined this patient along with nurse practitioner and we agreed on a plan of care.

## 2022-05-10 NOTE — PHYSICAL EXAM
[Person] : oriented to person [Place] : oriented to place [Time] : oriented to time [Short Term Intact] : short term memory intact [Remote Intact] : remote memory intact [Span Intact] : the attention span was normal [Concentration Intact] : normal concentrating ability [Fluency] : fluency intact [Comprehension] : comprehension intact [Current Events] : adequate knowledge of current events [Past History] : adequate knowledge of personal past history [Vocabulary] : adequate range of vocabulary [Cranial Nerves Optic (II)] : visual acuity intact bilaterally,  pupils equal round and reactive to light [Cranial Nerves Oculomotor (III)] : extraocular motion intact [Cranial Nerves Trigeminal (V)] : facial sensation intact symmetrically [Cranial Nerves Facial (VII)] : face symmetrical [Cranial Nerves Vestibulocochlear (VIII)] : hearing was intact bilaterally [Cranial Nerves Glossopharyngeal (IX)] : tongue and palate midline [Cranial Nerves Accessory (XI - Cranial And Spinal)] : head turning and shoulder shrug symmetric [Cranial Nerves Hypoglossal (XII)] : there was no tongue deviation with protrusion [Motor Tone] : muscle tone was normal in all four extremities [Motor Strength] : muscle strength was normal in all four extremities [No Muscle Atrophy] : normal bulk in all four extremities [Sensation Tactile Decrease] : light touch was intact [Abnormal Walk] : normal gait [Balance] : balance was intact [2+] : Ankle jerk left 2+ [Full ROM] : full ROM [No Pain with ROM] : no pain with motion in any direction [Past-pointing] : there was no past-pointing [Tremor] : no tremor present [Plantar Reflex Right Only] : normal on the right [Plantar Reflex Left Only] : normal on the left [Simental] : Simental's sign was not demonstrated [L'Hermitte's] : neck flexion did not produce tingling down the spine/arms [Spurling's - Opposite Side] : Negative Spurling's on opposite side [Spurling's Same Side] : Negative Spurling's on same side

## 2022-05-11 ENCOUNTER — APPOINTMENT (OUTPATIENT)
Dept: ORTHOPEDIC SURGERY | Facility: CLINIC | Age: 67
End: 2022-05-11

## 2022-06-30 ENCOUNTER — NON-APPOINTMENT (OUTPATIENT)
Age: 67
End: 2022-06-30

## 2022-07-09 PROBLEM — Z78.0 MENOPAUSE: Status: ACTIVE | Noted: 2020-12-14

## 2022-07-09 PROBLEM — Z11.51 SCREENING FOR HPV (HUMAN PAPILLOMAVIRUS): Status: ACTIVE | Noted: 2020-12-14

## 2022-07-09 NOTE — PLAN
[FreeTextEntry1] : Benefits and protocol for screening colonoscopy is an updated protocol reviewed with patient.\par \par The importance of an annual skin screening exam with dermatology as well as sun avoidance and sunscreen and early detection and prevention of skin cancer was reviewed with the patient\par \par The importance of calcium and vitamin D exercise and evaluation with a primary care annually to assess cardiovascular risk factors\par \par Follow-up for GYN exam in 1 year or sooner as needed

## 2022-07-09 NOTE — HISTORY OF PRESENT ILLNESS
[postmenopausal] : postmenopausal [N] : Patient is not sexually active [Y] : Positive pregnancy history [Menarche Age: ____] : age at menarche was [unfilled] [No] : Patient does not have concerns regarding sex [Mammogramdate] : 09/08/21 [TextBox_19] : BR1 [BreastSonogramDate] : 09/15/21 [TextBox_25] : BR1 [PapSmeardate] : 12/14/20 [TextBox_31] : NEG [BoneDensityDate] : 11/19/20 [TextBox_37] : OSTEOPENIA [HPVDate] : 12/14/20 [LMPDate] : age 49 [TextBox_78] : NEG [PGHxTotal] : 4 [[facility] CenterxFullTerm] : 4 [[facility] CenterxLiving] : 4 [FreeTextEntry1] : AGE 49

## 2022-10-03 ENCOUNTER — APPOINTMENT (OUTPATIENT)
Dept: SPINE | Facility: CLINIC | Age: 67
End: 2022-10-03

## 2022-10-03 VITALS
WEIGHT: 135 LBS | SYSTOLIC BLOOD PRESSURE: 100 MMHG | HEART RATE: 70 BPM | BODY MASS INDEX: 22.49 KG/M2 | HEIGHT: 65 IN | DIASTOLIC BLOOD PRESSURE: 69 MMHG | OXYGEN SATURATION: 98 %

## 2022-10-03 PROCEDURE — 99213 OFFICE O/P EST LOW 20 MIN: CPT

## 2022-10-03 RX ORDER — IBUPROFEN 800 MG/1
TABLET, FILM COATED ORAL
Refills: 0 | Status: ACTIVE | COMMUNITY

## 2022-10-04 ENCOUNTER — APPOINTMENT (OUTPATIENT)
Dept: ORTHOPEDIC SURGERY | Facility: CLINIC | Age: 67
End: 2022-10-04

## 2022-10-04 NOTE — ASSESSMENT
[FreeTextEntry1] : 67 year old female with significant spinal stenosis. Patient remains neurologically intact and without any clearly related symptoms. She will return in 6 months for reevaluation. She will return sooner if she notices any neurological impairments.

## 2022-10-04 NOTE — PHYSICAL EXAM

## 2022-10-04 NOTE — HISTORY OF PRESENT ILLNESS
[FreeTextEntry1] :  66 year old lady H/o guillain barre syndrome and GERD. C/o neck pain for one year. Denies any falls or trauma Pain intermittently radiates to back of the head, down between scapula and left breast. She describes the pain to the back of head as a pressure. She has intermittent numbness and tingling of hands which resolves. Ambulates independently without difficulty. Takes gabapentin which is beneficial. She is doing acupuncture and yoga which is beneficial. No physical therapy, chiropractor, or pain management initiated. MRI (4/15/2022) shows C4-C7 spinal stenosis with cord impingement. There is no T2 signal change in the cord. Her only symptom would be numbness in the hands when waking up which resolved shortly thereafter. \par  \par

## 2022-10-04 NOTE — REASON FOR VISIT
[Follow-Up: _____] : a [unfilled] follow-up visit [Other: _____] : [unfilled] [FreeTextEntry1] : Ms. Cantor is here for follow up with cervical stenosis. Today she describes pressure sensation present in the morning improved with gabapentin. Taking gabapentin 300 mg TID which is helpful. Denies any balance issues, gait instability, fine/gross motor skill issues, weakness of extremities, urinary or bowel incontinence

## 2022-10-14 ENCOUNTER — APPOINTMENT (OUTPATIENT)
Dept: ORTHOPEDIC SURGERY | Facility: CLINIC | Age: 67
End: 2022-10-14

## 2022-10-14 VITALS — HEIGHT: 65 IN | BODY MASS INDEX: 22.49 KG/M2 | WEIGHT: 135 LBS

## 2022-10-14 DIAGNOSIS — M70.51 OTHER BURSITIS OF KNEE, RIGHT KNEE: ICD-10-CM

## 2022-10-14 DIAGNOSIS — M70.41 PREPATELLAR BURSITIS, RIGHT KNEE: ICD-10-CM

## 2022-10-14 DIAGNOSIS — M17.11 UNILATERAL PRIMARY OSTEOARTHRITIS, RIGHT KNEE: ICD-10-CM

## 2022-10-14 PROCEDURE — 99213 OFFICE O/P EST LOW 20 MIN: CPT | Mod: 25

## 2022-10-14 PROCEDURE — 73564 X-RAY EXAM KNEE 4 OR MORE: CPT | Mod: RT

## 2022-10-14 PROCEDURE — 99203 OFFICE O/P NEW LOW 30 MIN: CPT | Mod: 25

## 2022-10-14 PROCEDURE — 20611 DRAIN/INJ JOINT/BURSA W/US: CPT

## 2022-10-14 NOTE — PROCEDURE
[Large Joint Injection] : Large joint injection [Right] : of the right [Knee] : knee [Pain] : pain [Inflammation] : inflammation [Betadine] : betadine [___ cc    1%] : Lidocaine ~Vcc of 1%  [Other: ___] : [unfilled] [] : Patient tolerated procedure well [Call if redness, pain or fever occur] : call if redness, pain or fever occur [Apply ice for 15min out of every hour for the next 12-24 hours as tolerated] : apply ice for 15 minutes out of every hour for the next 12-24 hours as tolerated [Patient was advised to rest the joint(s) for ____ days] : patient was advised to rest the joint(s) for [unfilled] days [de-identified] : 15 ccs [de-identified] : heme

## 2022-10-14 NOTE — PHYSICAL EXAM
[5___] : hamstring 5[unfilled]/5 [Right] : right knee [All Views] : anteroposterior, lateral, skyline, and anteroposterior standing [There are no fractures, subluxations or dislocations. No significant abnormalities are seen] : There are no fractures, subluxations or dislocations. No significant abnormalities are seen [Degenerative change] : Degenerative change [] : no calf tenderness [TWNoteComboBox7] : flexion 130 degrees [de-identified] : extension 0 degrees

## 2022-10-14 NOTE — DISCUSSION/SUMMARY
[de-identified] : Patient allowed to gently start resuming activities. \par Discussed change to medication prescription and usage. \par Aspiration discussed.

## 2022-10-14 NOTE — HISTORY OF PRESENT ILLNESS
[Sudden] : sudden [4] : 4 [3] : 3 [Dull/Aching] : dull/aching [Localized] : localized [Nothing helps with pain getting better] : Nothing helps with pain getting better [Retired] : Work status: retired [de-identified] : 67 year old female with pain and swelling right knee, she fell in august on wooden deck at home and landed onto the knee, developed pain. She really only has discomfort if she kneels onto the knee, no pain with walking or stairs.  [] : no [FreeTextEntry1] : rt knee [FreeTextEntry5] : pt fell early Aug.2022 ,hit knee and knee has been swollen since [de-identified] : kneeling when yoga

## 2022-11-23 NOTE — DISCHARGE NOTE ADULT - CARE PROVIDERS DIRECT ADDRESSES
Yes ,[email or web address],DirectAddress_Unknown,DirectAddress_Unknown,DirectAddress_Unknown

## 2023-01-30 ENCOUNTER — APPOINTMENT (OUTPATIENT)
Dept: OBGYN | Facility: CLINIC | Age: 68
End: 2023-01-30
Payer: MEDICARE

## 2023-01-30 ENCOUNTER — NON-APPOINTMENT (OUTPATIENT)
Age: 68
End: 2023-01-30

## 2023-01-30 VITALS
WEIGHT: 130 LBS | TEMPERATURE: 97 F | DIASTOLIC BLOOD PRESSURE: 68 MMHG | BODY MASS INDEX: 21.66 KG/M2 | SYSTOLIC BLOOD PRESSURE: 112 MMHG | HEIGHT: 65 IN

## 2023-01-30 DIAGNOSIS — R30.0 DYSURIA: ICD-10-CM

## 2023-01-30 DIAGNOSIS — Z01.419 ENCOUNTER FOR GYNECOLOGICAL EXAMINATION (GENERAL) (ROUTINE) W/OUT ABNORMAL FINDINGS: ICD-10-CM

## 2023-01-30 DIAGNOSIS — N95.2 POSTMENOPAUSAL ATROPHIC VAGINITIS: ICD-10-CM

## 2023-01-30 LAB
BILIRUB UR QL STRIP: NORMAL
GLUCOSE UR-MCNC: NORMAL
HCG UR QL: 0.2 EU/DL
HGB UR QL STRIP.AUTO: NORMAL
KETONES UR-MCNC: ABNORMAL
LEUKOCYTE ESTERASE UR QL STRIP: ABNORMAL
NITRITE UR QL STRIP: NORMAL
PH UR STRIP: 5.5
PROT UR STRIP-MCNC: NORMAL
SP GR UR STRIP: 1.02

## 2023-01-30 PROCEDURE — 81003 URINALYSIS AUTO W/O SCOPE: CPT | Mod: QW

## 2023-01-30 PROCEDURE — 99397 PER PM REEVAL EST PAT 65+ YR: CPT

## 2023-01-30 NOTE — PHYSICAL EXAM

## 2023-01-30 NOTE — PLAN
[FreeTextEntry1] : fu urology IC specialist\par FU pelvic sono\par fu uc\par RBSESE vaginal estrogen reviewed ( pt denies any hx blood clots MI)\par FU annually

## 2023-01-30 NOTE — HISTORY OF PRESENT ILLNESS
[postmenopausal] : postmenopausal [N] : Patient does not use contraception [Y] : Positive pregnancy history [Menarche Age: ____] : age at menarche was [unfilled] [No] : Patient does not have concerns regarding sex [Currently Active] : currently active [Mammogramdate] : 04/15/22 [TextBox_19] : BR1 [BreastSonogramDate] : 04/15/22 [TextBox_25] : BR1 [PapSmeardate] : 01/24/22 [TextBox_31] : WNL [BoneDensityDate] : 11/19/20 [TextBox_37] : OSTEOPENIA [TextBox_78] : NEG [HPVDate] : 01/24/22 [LMPDate] : 2005 [PGHxTotal] : 4 [[facility] ClinicxFullTerm] : 4 [[facility]xLiving] : 4 [FreeTextEntry1] : 2005

## 2023-02-04 LAB
APPEARANCE: CLEAR
BACTERIA UR CULT: NORMAL
BACTERIA: NEGATIVE
BILIRUBIN URINE: NEGATIVE
BLOOD URINE: NEGATIVE
COLOR: NORMAL
CYTOLOGY CVX/VAG DOC THIN PREP: ABNORMAL
GLUCOSE QUALITATIVE U: NEGATIVE
HPV HIGH+LOW RISK DNA PNL CVX: NOT DETECTED
HYALINE CASTS: 0 /LPF
KETONES URINE: ABNORMAL
LEUKOCYTE ESTERASE URINE: ABNORMAL
MICROSCOPIC-UA: NORMAL
NITRITE URINE: NEGATIVE
PH URINE: 6
PROTEIN URINE: NEGATIVE
RED BLOOD CELLS URINE: 7 /HPF
SPECIFIC GRAVITY URINE: 1.02
SQUAMOUS EPITHELIAL CELLS: 1 /HPF
UROBILINOGEN URINE: NORMAL
WHITE BLOOD CELLS URINE: 1 /HPF

## 2023-02-08 ENCOUNTER — NON-APPOINTMENT (OUTPATIENT)
Age: 68
End: 2023-02-08

## 2023-02-20 ENCOUNTER — APPOINTMENT (OUTPATIENT)
Dept: OBGYN | Facility: CLINIC | Age: 68
End: 2023-02-20

## 2023-02-20 ENCOUNTER — APPOINTMENT (OUTPATIENT)
Dept: ANTEPARTUM | Facility: CLINIC | Age: 68
End: 2023-02-20
Payer: MEDICARE

## 2023-02-20 ENCOUNTER — TRANSCRIPTION ENCOUNTER (OUTPATIENT)
Age: 68
End: 2023-02-20

## 2023-02-20 ENCOUNTER — ASOB RESULT (OUTPATIENT)
Age: 68
End: 2023-02-20

## 2023-02-20 DIAGNOSIS — N64.4 MASTODYNIA: ICD-10-CM

## 2023-02-20 PROCEDURE — 76830 TRANSVAGINAL US NON-OB: CPT

## 2023-02-21 ENCOUNTER — OUTPATIENT (OUTPATIENT)
Dept: OUTPATIENT SERVICES | Facility: HOSPITAL | Age: 68
LOS: 1 days | Discharge: HOME | End: 2023-02-21
Payer: MEDICARE

## 2023-02-21 ENCOUNTER — TRANSCRIPTION ENCOUNTER (OUTPATIENT)
Age: 68
End: 2023-02-21

## 2023-02-21 VITALS
RESPIRATION RATE: 13 BRPM | HEART RATE: 56 BPM | HEIGHT: 63.5 IN | WEIGHT: 132.72 LBS | SYSTOLIC BLOOD PRESSURE: 127 MMHG | OXYGEN SATURATION: 100 % | TEMPERATURE: 98 F | DIASTOLIC BLOOD PRESSURE: 73 MMHG

## 2023-02-21 VITALS
SYSTOLIC BLOOD PRESSURE: 128 MMHG | DIASTOLIC BLOOD PRESSURE: 97 MMHG | OXYGEN SATURATION: 100 % | HEART RATE: 56 BPM | RESPIRATION RATE: 12 BRPM | TEMPERATURE: 98 F

## 2023-02-21 DIAGNOSIS — Z98.890 OTHER SPECIFIED POSTPROCEDURAL STATES: Chronic | ICD-10-CM

## 2023-02-21 DIAGNOSIS — Z98.49 CATARACT EXTRACTION STATUS, UNSPECIFIED EYE: Chronic | ICD-10-CM

## 2023-02-21 DIAGNOSIS — N80.9 ENDOMETRIOSIS, UNSPECIFIED: Chronic | ICD-10-CM

## 2023-02-21 DIAGNOSIS — Z90.721 ACQUIRED ABSENCE OF OVARIES, UNILATERAL: Chronic | ICD-10-CM

## 2023-02-21 DIAGNOSIS — H59.022 CATARACT (LENS) FRAGMENTS IN EYE FOLLOWING CATARACT SURGERY, LEFT EYE: ICD-10-CM

## 2023-02-21 PROCEDURE — V2632: CPT

## 2023-02-21 PROCEDURE — 66852 REMOVAL OF LENS MATERIAL: CPT | Mod: LT

## 2023-02-21 PROCEDURE — 67036 REMOVAL OF INNER EYE FLUID: CPT | Mod: AS,59

## 2023-02-21 PROCEDURE — 66986 EXCHANGE LENS PROSTHESIS: CPT | Mod: LT

## 2023-02-21 PROCEDURE — 67121 REMOVE EYE IMPLANT MATERIAL: CPT | Mod: AS

## 2023-02-21 DEVICE — IMPLANTABLE DEVICE: Type: IMPLANTABLE DEVICE | Site: LEFT | Status: FUNCTIONAL

## 2023-02-21 NOTE — ASU DISCHARGE PLAN (ADULT/PEDIATRIC) - CARE PROVIDER_API CALL
Luiz Arellano (MD)  Ophthalmology  [address] [place] [address], [address]  [place] [address]  Phone: [phone]  Fax: [phone]  Scheduled Appointment: 02/22/2023 09:00 AM

## 2023-02-21 NOTE — ASU PATIENT PROFILE, ADULT - NSICDXPASTMEDICALHX_GEN_ALL_CORE_FT
PAST MEDICAL HISTORY:  Chronic GERD     Hemorrhoids     Hiatal hernia     History of depression     History of Lyme disease     HLD (hyperlipidemia)     Irritable bowel syndrome (IBS)     Meningioma     Mild intermittent occasional asthma without complication     Nephrolithiasis     Spondylosis without myelopathy or radiculopathy, lumbar region and Cervical

## 2023-02-21 NOTE — ASU DISCHARGE PLAN (ADULT/PEDIATRIC) - NS MD DC FALL RISK RISK
For information on Fall & Injury Prevention, visit: [email or web address] OR  [email or web address] OR  https://www.cdc.gov/steadi/patient.html

## 2023-02-21 NOTE — ASU PREOP CHECKLIST - HEIGHT IN CM
Yes, the a1c is a 3-month average of sugar levels and is not affected by a single day. Lipids recent studies have shown also are not hugely affected by fasting or not fasting, so many doctors do not even instruct pts to fast for this. Still needs metformin and focus on diet, though this should all help and I am sure her numbers will improve next time.   161.29

## 2023-02-21 NOTE — ASU PATIENT PROFILE, ADULT - FALL HARM RISK - HARM RISK INTERVENTIONS
Assistance with ambulation/Communicate Risk of Fall with Harm to all staff/Monitor for mental status changes/Monitor gait and stability/Reinforce activity limits and safety measures with patient and family/Tailored Fall Risk Interventions/Visual Cue: Yellow wristband and red socks/Bed in lowest position, wheels locked, appropriate side rails in place/Call bell, personal items and telephone in reach/Instruct patient to call for assistance before getting out of bed or chair/Non-slip footwear when patient is out of bed/[place] to call system/Physically safe environment - no spills, clutter or unnecessary equipment/Purposeful Proactive Rounding/Room/bathroom lighting operational, light cord in reach

## 2023-02-21 NOTE — ASU PATIENT PROFILE, ADULT - NSICDXPASTSURGICALHX_GEN_ALL_CORE_FT
PAST SURGICAL HISTORY:  Endometriosis determined by laparoscopy     History of cataract surgery     History of craniotomy resection of menigioma    History of left oophorectomy and Removal of Fallopian Tubes    History of varicose vein ligation and stripping     History of vitrectomy left eye    S/P LASIK surgery

## 2023-02-23 ENCOUNTER — APPOINTMENT (OUTPATIENT)
Dept: UROLOGY | Facility: CLINIC | Age: 68
End: 2023-02-23

## 2023-04-03 ENCOUNTER — APPOINTMENT (OUTPATIENT)
Dept: SPINE | Facility: CLINIC | Age: 68
End: 2023-04-03
Payer: MEDICARE

## 2023-04-03 VITALS
DIASTOLIC BLOOD PRESSURE: 70 MMHG | TEMPERATURE: 97.5 F | OXYGEN SATURATION: 100 % | HEIGHT: 65 IN | HEART RATE: 59 BPM | SYSTOLIC BLOOD PRESSURE: 108 MMHG | BODY MASS INDEX: 21.33 KG/M2 | WEIGHT: 128 LBS

## 2023-04-03 PROCEDURE — 99213 OFFICE O/P EST LOW 20 MIN: CPT

## 2023-04-04 RX ORDER — NITROFURANTOIN MACROCRYSTAL 50 MG
1 CAPSULE ORAL
Qty: 0 | Refills: 0 | DISCHARGE

## 2023-04-04 RX ORDER — GABAPENTIN 400 MG/1
1 CAPSULE ORAL
Qty: 0 | Refills: 0 | DISCHARGE

## 2023-04-04 RX ORDER — UBIDECARENONE 100 MG
1 CAPSULE ORAL
Qty: 0 | Refills: 0 | DISCHARGE

## 2023-04-04 RX ORDER — PHENAZOPYRIDINE HCL 100 MG
1 TABLET ORAL
Qty: 0 | Refills: 0 | DISCHARGE

## 2023-04-04 RX ORDER — ESTRADIOL 4 UG/1
0 INSERT VAGINAL
Qty: 0 | Refills: 0 | DISCHARGE

## 2023-04-04 NOTE — REASON FOR VISIT
[Follow-Up: _____] : a [unfilled] follow-up visit [Spouse] : spouse [FreeTextEntry1] : Ms. Cantor is a 67 year old lady H/o Guillain barre syndrome, GERD and cervical spinal stenosis. MRI (4/15/2022) shows C4-C7 spinal stenosis with cord impingement. There is no T2 signal change in the cord. She has a history of chronic neck pain which intermittently radiates to back of the head, down between scapula and left breast which has now resolved. She is taking gabapentin 300 mg BID, receiving acupuncture and doing yoga which are beneficial. Denies any balance issues, gait instability, fine/gross motor skill issues, weakness of extremities, urinary or bowel incontinence \par \par \par

## 2023-04-04 NOTE — ASSESSMENT
[FreeTextEntry1] : 67 year old female with cervical stenosis which is completely asymptomatic with a normal neurological exam.\par neurologically intact. Follow up 6 months.

## 2023-04-20 ENCOUNTER — OUTPATIENT (OUTPATIENT)
Dept: OUTPATIENT SERVICES | Facility: HOSPITAL | Age: 68
LOS: 1 days | End: 2023-04-20
Payer: MEDICARE

## 2023-04-20 ENCOUNTER — APPOINTMENT (OUTPATIENT)
Dept: ULTRASOUND IMAGING | Facility: CLINIC | Age: 68
End: 2023-04-20
Payer: MEDICARE

## 2023-04-20 ENCOUNTER — APPOINTMENT (OUTPATIENT)
Dept: MAMMOGRAPHY | Facility: CLINIC | Age: 68
End: 2023-04-20
Payer: MEDICARE

## 2023-04-20 ENCOUNTER — RESULT REVIEW (OUTPATIENT)
Age: 68
End: 2023-04-20

## 2023-04-20 DIAGNOSIS — Z98.890 OTHER SPECIFIED POSTPROCEDURAL STATES: Chronic | ICD-10-CM

## 2023-04-20 DIAGNOSIS — Z00.8 ENCOUNTER FOR OTHER GENERAL EXAMINATION: ICD-10-CM

## 2023-04-20 DIAGNOSIS — Z98.49 CATARACT EXTRACTION STATUS, UNSPECIFIED EYE: Chronic | ICD-10-CM

## 2023-04-20 DIAGNOSIS — R92.8 OTHER ABNORMAL AND INCONCLUSIVE FINDINGS ON DIAGNOSTIC IMAGING OF BREAST: ICD-10-CM

## 2023-04-20 DIAGNOSIS — N80.9 ENDOMETRIOSIS, UNSPECIFIED: Chronic | ICD-10-CM

## 2023-04-20 DIAGNOSIS — Z90.721 ACQUIRED ABSENCE OF OVARIES, UNILATERAL: Chronic | ICD-10-CM

## 2023-04-20 PROBLEM — J45.20 MILD INTERMITTENT ASTHMA, UNCOMPLICATED: Chronic | Status: ACTIVE | Noted: 2023-02-21

## 2023-04-20 PROBLEM — K21.9 GASTRO-ESOPHAGEAL REFLUX DISEASE WITHOUT ESOPHAGITIS: Chronic | Status: ACTIVE | Noted: 2023-02-21

## 2023-04-20 PROBLEM — Z86.19 PERSONAL HISTORY OF OTHER INFECTIOUS AND PARASITIC DISEASES: Chronic | Status: ACTIVE | Noted: 2023-02-21

## 2023-04-20 PROBLEM — D32.9 BENIGN NEOPLASM OF MENINGES, UNSPECIFIED: Chronic | Status: ACTIVE | Noted: 2023-02-21

## 2023-04-20 PROBLEM — M47.816 SPONDYLOSIS WITHOUT MYELOPATHY OR RADICULOPATHY, LUMBAR REGION: Chronic | Status: ACTIVE | Noted: 2023-02-21

## 2023-04-20 PROBLEM — Z86.59 PERSONAL HISTORY OF OTHER MENTAL AND BEHAVIORAL DISORDERS: Chronic | Status: ACTIVE | Noted: 2023-02-21

## 2023-04-20 PROBLEM — K64.9 UNSPECIFIED HEMORRHOIDS: Chronic | Status: ACTIVE | Noted: 2023-02-21

## 2023-04-20 PROBLEM — K58.9 IRRITABLE BOWEL SYNDROME, UNSPECIFIED: Chronic | Status: ACTIVE | Noted: 2023-02-21

## 2023-04-20 PROBLEM — E78.5 HYPERLIPIDEMIA, UNSPECIFIED: Chronic | Status: ACTIVE | Noted: 2023-02-21

## 2023-04-20 PROBLEM — N20.0 CALCULUS OF KIDNEY: Chronic | Status: ACTIVE | Noted: 2023-02-21

## 2023-04-20 PROBLEM — K44.9 DIAPHRAGMATIC HERNIA WITHOUT OBSTRUCTION OR GANGRENE: Chronic | Status: ACTIVE | Noted: 2023-02-21

## 2023-04-20 PROBLEM — K58.9 IRRITABLE BOWEL SYNDROME WITHOUT DIARRHEA: Chronic | Status: ACTIVE | Noted: 2023-02-21

## 2023-04-20 PROCEDURE — 77063 BREAST TOMOSYNTHESIS BI: CPT

## 2023-04-20 PROCEDURE — 77067 SCR MAMMO BI INCL CAD: CPT

## 2023-04-20 PROCEDURE — 76641 ULTRASOUND BREAST COMPLETE: CPT | Mod: 26,50

## 2023-04-20 PROCEDURE — 77063 BREAST TOMOSYNTHESIS BI: CPT | Mod: 26

## 2023-04-20 PROCEDURE — 76641 ULTRASOUND BREAST COMPLETE: CPT

## 2023-04-20 PROCEDURE — 77067 SCR MAMMO BI INCL CAD: CPT | Mod: 26

## 2023-05-05 ENCOUNTER — APPOINTMENT (OUTPATIENT)
Dept: UROGYNECOLOGY | Facility: CLINIC | Age: 68
End: 2023-05-05
Payer: MEDICARE

## 2023-05-05 VITALS — DIASTOLIC BLOOD PRESSURE: 70 MMHG | TEMPERATURE: 98.5 F | SYSTOLIC BLOOD PRESSURE: 125 MMHG

## 2023-05-05 PROCEDURE — 51798 US URINE CAPACITY MEASURE: CPT

## 2023-05-05 PROCEDURE — 99205 OFFICE O/P NEW HI 60 MIN: CPT

## 2023-05-05 NOTE — LETTER BODY
[Dear  ___] : Dear ~ROJAS, [I had the pleasure of evaluating your patient, [unfilled]. Thank you for referring Ms. [unfilled] for consultation for ___] : I had the pleasure of evaluating your patient, [unfilled]. Thank you for referring Ms. [unfilled] for consultation for [unfilled]. [Attached please find my note.] : Attached please find my note. [Thank you very much for allowing me to participate in the care of this patient. If you have any questions, please do not hesitate to contact me] : Thank you very much for allowing me to participate in the care of this patient. If you have any questions, please do not hesitate to contact me.

## 2023-05-05 NOTE — PHYSICAL EXAM
[No Acute Distress] : in no acute distress [Well developed] : well developed [Well Nourished] : ~L well nourished [Good Hygeine] : demonstrates good hygeine [Oriented x3] : oriented to person, place, and time [Normal Memory] : ~T memory was ~L unimpaired [Normal Mood/Affect] : mood and affect are normal [No Edema] : ~T edema was not present [None] : no CVA tenderness [Warm and Dry] : was warm and dry to touch [Normal Gait] : gait was normal [No Lesions] : no lesions were seen on the external genitalia [Vulvar Atrophy] : vulvar atrophy [Labia Majora] : were normal [Labia Minora] : were normal [Normal] : was normal [Normal Appearance] : general appearance was normal [Estrogen Effect] : estrogen effect was observed [No Bleeding] : there was no active vaginal bleeding [Post Void Residual ____ml] : post void residual was [unfilled] ml [Tenderness] : tenderness [Exam Deferred] : was deferred [Anxiety] : patient is not anxious [Distended] : not distended [de-identified] : Q-tip touch test negative. No erythema, no erosions, no ulcerations, no fissures. [FreeTextEntry4] : PFMs wnl, supple, non-tender to palpation [de-identified] : Posterior bladder wall mildly tender to palpation

## 2023-05-05 NOTE — DISCUSSION/SUMMARY
[FreeTextEntry1] : I had a long discussion with Josh regarding UTI symptoms and IC/BPS. I reviewed the pathologies, possible etiologies, diagnosis, symptoms and treatment options available. Written information was given to the patient.\par \par Amitriptyline 10-30mg po QPM. Titration schedule given. SE rev'd including but not limited to constipation, dry mouth, weight gain, vivid dreams, palpitations, dizziness, drowsiness. Pt verbalized understanding.\par \par Pyridium 200mg 1 tab up to BID prn IC symptoms. Take with food, increase water intake.\par OR \par Uribel 1 capsule up to BID prn IC symptoms. Take with food, increase water intake. \par Josh will let me know which medication is more efficacious and I will order for her. \par \par Suggested she go to [facility] website for more information on IC and diet [email or web address]\par \par We also discussed finding of MH on UA. Work-up for MH including R/B/A rev'd: CTU and cystoscopy. \par Rx for CTU given\par Will schedule cysto \par \par PVR= 0ml\par UA and C&S sent\par \par I told Josh if she thinks she has a UTI to drop off a urine specimen at a [facility] lab for UA and C&S (rx in [employer]). She was instructed to call the office 4 days after she drops off her urine for results. In the interim, she should increase water intake and may take either pyridium 200mg up to TID w foor OR Uribel 1 capsule up to TID w food. \par \par Discussed role of intravesical instillations for IC/BPS as both diagnostic and therapeutic. Written information given. \par \par RTO cysto w Dr. Rosario for MH\par RTO 2 months\par

## 2023-05-05 NOTE — HISTORY OF PRESENT ILLNESS
[FreeTextEntry1] : Josh is an extremely pleasant 68yo F referred by Yessenia Willett, NP-C for UTI symptoms and possible IC/BPS. \par \par Gemini endorses bladder pain with filling and relief with emptying. At this time, she also started to have daytime frequency, nocturia. \par Prior to this no bladder based symptoms. Historically, she gets UTIs 1x/year. Remote hx of renal calculi which she passed without problem. \par Renal and bladder US May 2020 was wnl.\par \par In January saw PCP who started her on cipro and pyridium, culture proved negative. \par She still had pain after course of abx and PCP started her on nitrofurantoin x 7d, still no relief (again, negative urine culture).\par Pyridium 200mg helped symptoms. \par Went to GYN who thought she might have IC/BPS.\par GYN started her on topical estradiol 1/2 gram TIW x 3 months. \par Josh decreased her intake to 1 cup of coffee and seltzer/day.\par \par 1/30/23 UA with MH; C&S negative.\par \par Currently, she endorses daytime frequency q 1-2 hours, nocturia x 1, no urgency, pain with filling and relief with emptying. \par Voiding diary shows frequency voids, small volume per patient.\par \par Sexually active with 1 male partner. \par No entry or deep dyspareunia.\par No RVVC, RBV\par Paps wnl\par Mamograms wnl\par \par Age 50 had b/l oophorectomy/salpingectomy for endometriosis.\par \par No constipation\par \par Spinal and cervical stenosis - takes gabapentin 300mg\par

## 2023-05-09 LAB
APPEARANCE: CLEAR
BACTERIA UR CULT: NORMAL
BACTERIA: NEGATIVE /HPF
BILIRUBIN URINE: NEGATIVE
BLOOD URINE: NEGATIVE
CAST: 0 /LPF
COLOR: YELLOW
EPITHELIAL CELLS: 5 /HPF
GLUCOSE QUALITATIVE U: NEGATIVE MG/DL
KETONES URINE: ABNORMAL MG/DL
LEUKOCYTE ESTERASE URINE: ABNORMAL
MICROSCOPIC-UA: NORMAL
NITRITE URINE: NEGATIVE
PH URINE: 6
PROTEIN URINE: NEGATIVE MG/DL
RED BLOOD CELLS URINE: 0 /HPF
SPECIFIC GRAVITY URINE: 1.01
UROBILINOGEN URINE: 0.2 MG/DL
WHITE BLOOD CELLS URINE: 2 /HPF

## 2023-05-15 ENCOUNTER — OUTPATIENT (OUTPATIENT)
Dept: OUTPATIENT SERVICES | Facility: HOSPITAL | Age: 68
LOS: 1 days | End: 2023-05-15
Payer: MEDICARE

## 2023-05-15 ENCOUNTER — APPOINTMENT (OUTPATIENT)
Dept: CT IMAGING | Facility: CLINIC | Age: 68
End: 2023-05-15
Payer: MEDICARE

## 2023-05-15 DIAGNOSIS — Z98.890 OTHER SPECIFIED POSTPROCEDURAL STATES: Chronic | ICD-10-CM

## 2023-05-15 DIAGNOSIS — R31.29 OTHER MICROSCOPIC HEMATURIA: ICD-10-CM

## 2023-05-15 DIAGNOSIS — Z90.721 ACQUIRED ABSENCE OF OVARIES, UNILATERAL: Chronic | ICD-10-CM

## 2023-05-15 DIAGNOSIS — N80.9 ENDOMETRIOSIS, UNSPECIFIED: Chronic | ICD-10-CM

## 2023-05-15 DIAGNOSIS — Z98.49 CATARACT EXTRACTION STATUS, UNSPECIFIED EYE: Chronic | ICD-10-CM

## 2023-05-15 PROCEDURE — 74178 CT ABD&PLV WO CNTR FLWD CNTR: CPT

## 2023-05-15 PROCEDURE — 74178 CT ABD&PLV WO CNTR FLWD CNTR: CPT | Mod: 26

## 2023-06-01 NOTE — PLAN
[FreeTextEntry1] : - Blood work today\par - Mammography script provided \par - d/c Simvastatin for x2 weeks to see if muscle cramps stop\par - Reviewed risk/benefits/alternatives of the shingrix vaccine\par - Increase hydration\par - Begin stretching after exercise\par - Coronary Calcium Score CT ordered \par - Encouraged to follow up with Dermatologist  Cheiloplasty (Complex) Text: A decision was made to reconstruct the defect with a  cheiloplasty.  The defect was undermined extensively.  Additional obicularis oris muscle was excised with a 15 blade scalpel.  The defect was converted into a full thickness wedge to facilite a better cosmetic result.  Small vessels were then tied off with 5-0 monocyrl. The obicularis oris, superficial fascia, adipose and dermis were then reapproximated.  After the deeper layers were approximated the epidermis was reapproximated with particular care given to realign the vermilion border.

## 2023-06-07 ENCOUNTER — NON-APPOINTMENT (OUTPATIENT)
Age: 68
End: 2023-06-07

## 2023-06-15 ENCOUNTER — APPOINTMENT (OUTPATIENT)
Dept: UROGYNECOLOGY | Facility: CLINIC | Age: 68
End: 2023-06-15
Payer: MEDICARE

## 2023-06-15 DIAGNOSIS — R31.29 OTHER MICROSCOPIC HEMATURIA: ICD-10-CM

## 2023-06-15 PROCEDURE — 52000 CYSTOURETHROSCOPY: CPT

## 2023-07-28 ENCOUNTER — APPOINTMENT (OUTPATIENT)
Dept: UROGYNECOLOGY | Facility: CLINIC | Age: 68
End: 2023-07-28
Payer: MEDICARE

## 2023-07-28 VITALS — TEMPERATURE: 98.5 F | DIASTOLIC BLOOD PRESSURE: 66 MMHG | SYSTOLIC BLOOD PRESSURE: 107 MMHG

## 2023-07-28 DIAGNOSIS — R39.14 FEELING OF INCOMPLETE BLADDER EMPTYING: ICD-10-CM

## 2023-07-28 PROCEDURE — 99213 OFFICE O/P EST LOW 20 MIN: CPT

## 2023-07-28 PROCEDURE — 51798 US URINE CAPACITY MEASURE: CPT

## 2023-07-28 NOTE — PHYSICAL EXAM
[No Acute Distress] : in no acute distress [Well developed] : well developed [Well Nourished] : ~L well nourished [Good Hygeine] : demonstrates good hygeine [Oriented x3] : oriented to person, place, and time [Normal Memory] : ~T memory was ~L unimpaired [Normal Mood/Affect] : mood and affect are normal [No Edema] : ~T edema was not present [Warm and Dry] : was warm and dry to touch [Normal Gait] : gait was normal [Post Void Residual ____ml] : post void residual was [unfilled] ml [FreeTextEntry1] : Physical exam deferred today [Anxiety] : patient is not anxious

## 2023-07-28 NOTE — HISTORY OF PRESENT ILLNESS
[FreeTextEntry1] : Josh is here for a f/u visit. \par \par She is taking amitritpyline 30mg po qpm and has not had any further bladder symptoms. She denies UTI symptoms. Her frequency has improved. She still has nocturia x 1-2. She reports that sine increasing to 30mg amitriptyline, she has been having hesitancy and difficulty emptying her bladder, andrés at night. \par \par CTU 5/13/23 w R midpole renal cyst and nonobs R intrarenal calculi. Also showed focal thickening of the bowel  and underdistention of gastric antrum/pylorus.\par \par Cystoscopy 6/15/23 w Dr. Rosario for  was wnl.\par \par She had endoscopy and colonoscopy last week which patient said was wnl (no records to review).\par

## 2023-07-28 NOTE — DISCUSSION/SUMMARY
[FreeTextEntry1] : PVR = 0ml\par \par Continue amitriptyline 30mg po qpm.\par \par Alfuzosin ER 10mg 1 tab po QPM with dinner for her dysuria. AE including but not limited to orthostatic hypotension, dizziness, h/a, nasal stuffiness rev'd with patient. I instructed her to change positions slowly until she acclimates to the medication. She verbalized understanding.\par \par If after a month no improvement in hesitancy, Cammymartinejenni will call me and I will switch her to tamsulosin.\par \par She has pyridium and uribel to take on an as needed basis with IC/BPS flares. \par \par RTO 6 months or sooner if she wants to start intravesical therapy.\par

## 2023-09-08 NOTE — ASSESSMENT
[FreeTextEntry1] : 67 year old female with cervical stenosis which is completely asymptomatic. Neurologically intact.

## 2023-09-11 ENCOUNTER — APPOINTMENT (OUTPATIENT)
Dept: SPINE | Facility: CLINIC | Age: 68
End: 2023-09-11
Payer: MEDICARE

## 2023-09-11 VITALS
HEART RATE: 68 BPM | HEIGHT: 65 IN | OXYGEN SATURATION: 98 % | DIASTOLIC BLOOD PRESSURE: 75 MMHG | BODY MASS INDEX: 21.33 KG/M2 | SYSTOLIC BLOOD PRESSURE: 114 MMHG | WEIGHT: 128 LBS

## 2023-09-11 PROCEDURE — 99213 OFFICE O/P EST LOW 20 MIN: CPT

## 2023-09-11 RX ORDER — PHENAZOPYRIDINE HYDROCHLORIDE 200 MG/1
200 TABLET ORAL
Qty: 60 | Refills: 2 | Status: DISCONTINUED | COMMUNITY
Start: 2023-05-05 | End: 2023-09-11

## 2023-09-11 RX ORDER — ALFUZOSIN HYDROCHLORIDE 10 MG/1
10 TABLET, EXTENDED RELEASE ORAL
Qty: 90 | Refills: 0 | Status: DISCONTINUED | COMMUNITY
Start: 2023-07-28 | End: 2023-09-11

## 2024-01-03 ENCOUNTER — APPOINTMENT (OUTPATIENT)
Dept: UROGYNECOLOGY | Facility: CLINIC | Age: 69
End: 2024-01-03
Payer: COMMERCIAL

## 2024-01-03 VITALS — TEMPERATURE: 97.4 F | SYSTOLIC BLOOD PRESSURE: 128 MMHG | HEART RATE: 72 BPM | DIASTOLIC BLOOD PRESSURE: 77 MMHG

## 2024-01-03 DIAGNOSIS — N30.10 INTERSTITIAL CYSTITIS (CHRONIC) W/OUT HEMATURIA: ICD-10-CM

## 2024-01-03 DIAGNOSIS — R39.11 HESITANCY OF MICTURITION: ICD-10-CM

## 2024-01-03 DIAGNOSIS — R39.9 UNSPECIFIED SYMPTOMS AND SIGNS INVOLVING THE GENITOURINARY SYSTEM: ICD-10-CM

## 2024-01-03 DIAGNOSIS — N95.8 OTHER SPECIFIED MENOPAUSAL AND PERIMENOPAUSAL DISORDERS: ICD-10-CM

## 2024-01-03 PROCEDURE — 51798 US URINE CAPACITY MEASURE: CPT

## 2024-01-03 PROCEDURE — 99214 OFFICE O/P EST MOD 30 MIN: CPT

## 2024-01-03 RX ORDER — ESTRADIOL 0.1 MG/G
0.1 CREAM VAGINAL
Qty: 1 | Refills: 2 | Status: ACTIVE | COMMUNITY
Start: 2023-01-30 | End: 1900-01-01

## 2024-01-03 RX ORDER — PHENAZOPYRIDINE HYDROCHLORIDE 200 MG/1
200 TABLET ORAL
Qty: 60 | Refills: 1 | Status: ACTIVE | COMMUNITY
Start: 2024-01-03 | End: 1900-01-01

## 2024-01-03 RX ORDER — AMITRIPTYLINE HYDROCHLORIDE 10 MG/1
10 TABLET, FILM COATED ORAL
Qty: 270 | Refills: 3 | Status: ACTIVE | COMMUNITY
Start: 2023-05-05 | End: 1900-01-01

## 2024-01-03 NOTE — DISCUSSION/SUMMARY
[FreeTextEntry1] : 1) Hesitancy -PVR = 0ml -decrease amitriptyline to 20mg po qpm. If any increased bladder symptoms, may increase back to 30mg. -if continued hesitancy, start Alfuzosin ER 10mg po qd with dinner.   2) IC/BPS -pyridium 200mg po up to TID prn -t/c intravesical instillations  3) MARCO Garcia was instructed that at the first s/s of UTI to call the office for rx for UA and C&S to drop off urine specimen. She understands that she needs to call the office 3-4d after she drops off her urine for the results. In the interim, she may take pyridium 200mg BID-TID prn with food and should increase water intake. -continue estradiol 1/2gm TID as using  RTO 1 year

## 2024-01-03 NOTE — HISTORY OF PRESENT ILLNESS
[FreeTextEntry1] : Josh is here for a f/u visit.   She is taking amitritpyline 30mg po qpm and has not had any further bladder symptoms. She denies UTI symptoms. Her frequency has improved. She still has nocturia x 1-2. She reports that sine increasing to 30mg amitriptyline, she has been having hesitancy and difficulty emptying her bladder, andrés at night.  She never started alfuzosin ER 10mg.   CTU 5/13/23 w R midpole renal cyst and nonobs R intrarenal calculi. Also showed focal thickening of the bowel and underdistention of gastric antrum/pylorus. Cystoscopy 6/15/23 w Dr. Rosario for  was wnl. She had endoscopy and colonoscopy 7/2023 which patient said was wnl (no records to review).  Since her last visit she has only needed pyridium 200mg x 4 and 1-2d at most.   She is using estradiol 0.01% 1/2 gm PV 3x/week. She denies dyspareunia.

## 2024-01-03 NOTE — PHYSICAL EXAM
[No Acute Distress] : in no acute distress [Well developed] : well developed [Well Nourished] : ~L well nourished [Good Hygeine] : demonstrates good hygeine [Oriented x3] : oriented to person, place, and time [Normal Memory] : ~T memory was ~L unimpaired [Normal Mood/Affect] : mood and affect are normal [No Edema] : ~T edema was not present [Warm and Dry] : was warm and dry to touch [Normal Gait] : gait was normal [Post Void Residual ____ml] : post void residual was [unfilled] ml [Respirations regular] : ~T respiratory rate was regular [No Lesions] : no lesions were seen on the external genitalia [Labia Majora] : were normal [Labia Minora] : were normal [Normal Appearance] : general appearance was normal [Pink Rugae] : pink rugae [Estrogen Effect] : estrogen effect was observed [No Bleeding] : there was no active vaginal bleeding [Normal] : no abnormalities [Exam Deferred] : was deferred [Anxiety] : patient is not anxious [Tenderness] : ~T no ~M abdominal tenderness observed [Distended] : not distended [de-identified] : Q-tip touch test negative. No erythema, no erosions, no ulcerations, no fissures. [FreeTextEntry4] : PFMs wnl, supple, non-tender to palpation

## 2024-02-27 ENCOUNTER — NON-APPOINTMENT (OUTPATIENT)
Age: 69
End: 2024-02-27

## 2024-02-29 ENCOUNTER — RX RENEWAL (OUTPATIENT)
Age: 69
End: 2024-02-29

## 2024-03-04 ENCOUNTER — APPOINTMENT (OUTPATIENT)
Dept: OBGYN | Facility: CLINIC | Age: 69
End: 2024-03-04
Payer: MEDICARE

## 2024-03-04 VITALS
HEIGHT: 65 IN | WEIGHT: 140 LBS | SYSTOLIC BLOOD PRESSURE: 105 MMHG | DIASTOLIC BLOOD PRESSURE: 68 MMHG | BODY MASS INDEX: 23.32 KG/M2

## 2024-03-04 DIAGNOSIS — Z12.4 ENCOUNTER FOR SCREENING FOR MALIGNANT NEOPLASM OF CERVIX: ICD-10-CM

## 2024-03-04 DIAGNOSIS — Z01.419 ENCOUNTER FOR GYNECOLOGICAL EXAMINATION (GENERAL) (ROUTINE) W/OUT ABNORMAL FINDINGS: ICD-10-CM

## 2024-03-04 DIAGNOSIS — Z12.39 ENCOUNTER FOR OTHER SCREENING FOR MALIGNANT NEOPLASM OF BREAST: ICD-10-CM

## 2024-03-04 DIAGNOSIS — M85.80 OTHER SPECIFIED DISORDERS OF BONE DENSITY AND STRUCTURE, UNSPECIFIED SITE: ICD-10-CM

## 2024-03-04 PROCEDURE — 99397 PER PM REEVAL EST PAT 65+ YR: CPT

## 2024-03-09 LAB
APPEARANCE: CLEAR
BILIRUBIN URINE: NEGATIVE
BLOOD URINE: NEGATIVE
COLOR: YELLOW
CYTOLOGY CVX/VAG DOC THIN PREP: NORMAL
GLUCOSE QUALITATIVE U: NEGATIVE
HPV HIGH+LOW RISK DNA PNL CVX: NOT DETECTED
KETONES URINE: NEGATIVE
LEUKOCYTE ESTERASE URINE: NEGATIVE
NITRITE URINE: NEGATIVE
PH URINE: 6
PROTEIN URINE: NEGATIVE
SPECIFIC GRAVITY URINE: 1.02
UROBILINOGEN URINE: 0.2 (ref 0.2–?)

## 2024-03-11 ENCOUNTER — APPOINTMENT (OUTPATIENT)
Dept: SPINE | Facility: CLINIC | Age: 69
End: 2024-03-11
Payer: COMMERCIAL

## 2024-03-11 VITALS
HEIGHT: 65 IN | DIASTOLIC BLOOD PRESSURE: 77 MMHG | BODY MASS INDEX: 23.32 KG/M2 | WEIGHT: 140 LBS | HEART RATE: 70 BPM | OXYGEN SATURATION: 98 % | SYSTOLIC BLOOD PRESSURE: 129 MMHG

## 2024-03-11 DIAGNOSIS — M54.2 CERVICALGIA: ICD-10-CM

## 2024-03-11 DIAGNOSIS — M47.812 SPONDYLOSIS W/OUT MYELOPATHY OR RADICULOPATHY, CERVICAL REGION: ICD-10-CM

## 2024-03-11 PROCEDURE — 99213 OFFICE O/P EST LOW 20 MIN: CPT

## 2024-03-11 RX ORDER — MELOXICAM 15 MG/1
15 TABLET ORAL DAILY
Qty: 30 | Refills: 0 | Status: ACTIVE | COMMUNITY
Start: 2024-03-11 | End: 1900-01-01

## 2024-03-11 RX ORDER — GABAPENTIN 300 MG/1
300 CAPSULE ORAL
Qty: 90 | Refills: 5 | Status: DISCONTINUED | COMMUNITY
Start: 2023-04-03 | End: 2024-03-11

## 2024-03-11 NOTE — PHYSICAL EXAM
[Sensation Tactile Decrease] : light touch was intact [Motor Strength] : muscle strength was normal in all four extremities [Abnormal Walk] : normal gait [2+] : Ankle jerk left 2+ [Simental] : Simental's sign was not demonstrated

## 2024-03-11 NOTE — ASSESSMENT
[FreeTextEntry1] : 68 year old female with cervical stenosis which is completely asymptomatic. Neurologically intact. Meloxicam 15 mg daily ordered for neck pain. Medication Purpose, Action, Possible interactions, Side effects as well as adverse effects explain to pt.  She will follow up in one year.

## 2024-03-11 NOTE — REASON FOR VISIT
[Follow-Up: _____] : a [unfilled] follow-up visit [Other: _____] : [unfilled] [FreeTextEntry1] : Ms. Cantor is 68 year old female who is known to have C4-C7 spinal stenosis with cord impingement. She reports intermittent pain in the neck and mid back. She continues on gabapentin 300 mg BID, and using Meloxicam 15 mg as needed with significant relief. She receiving acupuncture and doing yoga regularly which are also beneficial. Denies any balance issues, gait instability, fine/gross motor skill issues, weakness of extremities, urinary or bowel incontinence.

## 2024-04-18 PROBLEM — Z12.39 SCREENING FOR BREAST CANCER: Status: ACTIVE | Noted: 2018-08-17

## 2024-04-18 PROBLEM — Z01.419 VISIT FOR GYNECOLOGIC EXAMINATION: Status: ACTIVE | Noted: 2024-04-18

## 2024-04-18 NOTE — HISTORY OF PRESENT ILLNESS
[LMP unknown] : LMP unknown [postmenopausal] : postmenopausal [N] : Patient does not use contraception [Y] : Positive pregnancy history [unknown] : Patient is unsure of the date of her LMP [Menarche Age: ____] : age at menarche was [unfilled] [FreeTextEntry1] : Pt presents for annual w/o complaints  She does have a past history of IC-   Shei is s/p supracervical ROSEANNE for endometriosis Dr. Paz- she is just back from long flight to [facility]   Under stress w son- does do yoga earthing ([employer]) and accupuncture [Patient reported mammogram was normal] : Patient reported mammogram was normal [Patient reported breast sonogram was normal] : Patient reported breast sonogram was normal [Patient reported PAP Smear was normal] : Patient reported PAP Smear was normal [Patient reported bone density results were abnormal] : Patient reported bone density results were abnormal [Patient reported colonoscopy was normal] : Patient reported colonoscopy was normal [Mammogramdate] : 04/20/203 [TextBox_19] : BR1 [BreastSonogramDate] : 04/20/203 [TextBox_25] : BR1 [PapSmeardate] : 01/30/2023 [TextBox_31] : NEG [BoneDensityDate] : 2022 [TextBox_37] : Osteopenia Dr. Hdez follows [ColonoscopyDate] : 2023 [HPVDate] : 01/30/203 [TextBox_78] : NEG [PGHxTotal] : 4 [[facility]] : 4 [[facility]] : 4

## 2024-04-24 ENCOUNTER — OUTPATIENT (OUTPATIENT)
Dept: OUTPATIENT SERVICES | Facility: HOSPITAL | Age: 69
LOS: 1 days | End: 2024-04-24
Payer: MEDICARE

## 2024-04-24 ENCOUNTER — RESULT REVIEW (OUTPATIENT)
Age: 69
End: 2024-04-24

## 2024-04-24 ENCOUNTER — APPOINTMENT (OUTPATIENT)
Dept: MAMMOGRAPHY | Facility: CLINIC | Age: 69
End: 2024-04-24
Payer: MEDICARE

## 2024-04-24 DIAGNOSIS — Z12.39 ENCOUNTER FOR OTHER SCREENING FOR MALIGNANT NEOPLASM OF BREAST: ICD-10-CM

## 2024-04-24 DIAGNOSIS — Z98.890 OTHER SPECIFIED POSTPROCEDURAL STATES: Chronic | ICD-10-CM

## 2024-04-24 DIAGNOSIS — Z90.721 ACQUIRED ABSENCE OF OVARIES, UNILATERAL: Chronic | ICD-10-CM

## 2024-04-24 DIAGNOSIS — Z00.8 ENCOUNTER FOR OTHER GENERAL EXAMINATION: ICD-10-CM

## 2024-04-24 DIAGNOSIS — Z98.49 CATARACT EXTRACTION STATUS, UNSPECIFIED EYE: Chronic | ICD-10-CM

## 2024-04-24 DIAGNOSIS — N80.9 ENDOMETRIOSIS, UNSPECIFIED: Chronic | ICD-10-CM

## 2024-04-24 PROCEDURE — 77067 SCR MAMMO BI INCL CAD: CPT | Mod: 26

## 2024-04-24 PROCEDURE — 77063 BREAST TOMOSYNTHESIS BI: CPT | Mod: 26

## 2024-04-24 PROCEDURE — 77067 SCR MAMMO BI INCL CAD: CPT

## 2024-04-24 PROCEDURE — 77063 BREAST TOMOSYNTHESIS BI: CPT

## 2024-05-08 ENCOUNTER — RX RENEWAL (OUTPATIENT)
Age: 69
End: 2024-05-08

## 2024-05-08 RX ORDER — MELOXICAM 7.5 MG/1
7.5 TABLET ORAL
Qty: 30 | Refills: 0 | Status: ACTIVE | COMMUNITY
Start: 2024-01-09 | End: 1900-01-01

## 2024-06-03 RX ORDER — GABAPENTIN 300 MG/1
300 CAPSULE ORAL
Qty: 90 | Refills: 4 | Status: ACTIVE | COMMUNITY
Start: 2022-04-29 | End: 1900-01-01

## 2024-09-16 ENCOUNTER — APPOINTMENT (OUTPATIENT)
Dept: ULTRASOUND IMAGING | Facility: CLINIC | Age: 69
End: 2024-09-16
Payer: MEDICARE

## 2024-09-16 ENCOUNTER — OUTPATIENT (OUTPATIENT)
Dept: OUTPATIENT SERVICES | Facility: HOSPITAL | Age: 69
LOS: 1 days | End: 2024-09-16
Payer: MEDICARE

## 2024-09-16 DIAGNOSIS — Z98.890 OTHER SPECIFIED POSTPROCEDURAL STATES: Chronic | ICD-10-CM

## 2024-09-16 DIAGNOSIS — N80.9 ENDOMETRIOSIS, UNSPECIFIED: Chronic | ICD-10-CM

## 2024-09-16 DIAGNOSIS — Z90.721 ACQUIRED ABSENCE OF OVARIES, UNILATERAL: Chronic | ICD-10-CM

## 2024-09-16 DIAGNOSIS — Z00.8 ENCOUNTER FOR OTHER GENERAL EXAMINATION: ICD-10-CM

## 2024-09-16 DIAGNOSIS — Z98.49 CATARACT EXTRACTION STATUS, UNSPECIFIED EYE: Chronic | ICD-10-CM

## 2024-09-16 PROCEDURE — 76700 US EXAM ABDOM COMPLETE: CPT | Mod: 26

## 2024-09-16 PROCEDURE — 76700 US EXAM ABDOM COMPLETE: CPT

## 2024-11-22 ENCOUNTER — OUTPATIENT (OUTPATIENT)
Dept: OUTPATIENT SERVICES | Facility: HOSPITAL | Age: 69
LOS: 1 days | End: 2024-11-22
Payer: MEDICARE

## 2024-11-22 ENCOUNTER — APPOINTMENT (OUTPATIENT)
Dept: RADIOLOGY | Facility: CLINIC | Age: 69
End: 2024-11-22
Payer: MEDICARE

## 2024-11-22 DIAGNOSIS — Z98.890 OTHER SPECIFIED POSTPROCEDURAL STATES: Chronic | ICD-10-CM

## 2024-11-22 DIAGNOSIS — Z00.8 ENCOUNTER FOR OTHER GENERAL EXAMINATION: ICD-10-CM

## 2024-11-22 DIAGNOSIS — Z12.39 ENCOUNTER FOR OTHER SCREENING FOR MALIGNANT NEOPLASM OF BREAST: ICD-10-CM

## 2024-11-22 DIAGNOSIS — Z98.49 CATARACT EXTRACTION STATUS, UNSPECIFIED EYE: Chronic | ICD-10-CM

## 2024-11-22 DIAGNOSIS — Z90.721 ACQUIRED ABSENCE OF OVARIES, UNILATERAL: Chronic | ICD-10-CM

## 2024-11-22 DIAGNOSIS — Z00.00 ENCOUNTER FOR GENERAL ADULT MEDICAL EXAMINATION WITHOUT ABNORMAL FINDINGS: ICD-10-CM

## 2024-11-22 DIAGNOSIS — N80.9 ENDOMETRIOSIS, UNSPECIFIED: Chronic | ICD-10-CM

## 2024-11-22 PROCEDURE — 77085 DXA BONE DENSITY AXL VRT FX: CPT

## 2024-11-22 PROCEDURE — 77085 DXA BONE DENSITY AXL VRT FX: CPT | Mod: 26

## 2024-12-25 PROBLEM — R52 BODY ACHES: Status: RESOLVED | Noted: 2020-08-25 | Resolved: 2024-12-25

## 2025-01-08 ENCOUNTER — APPOINTMENT (OUTPATIENT)
Dept: UROGYNECOLOGY | Facility: CLINIC | Age: 70
End: 2025-01-08
Payer: COMMERCIAL

## 2025-01-08 VITALS
WEIGHT: 145 LBS | BODY MASS INDEX: 24.16 KG/M2 | SYSTOLIC BLOOD PRESSURE: 109 MMHG | HEART RATE: 78 BPM | TEMPERATURE: 97.9 F | HEIGHT: 65 IN | DIASTOLIC BLOOD PRESSURE: 67 MMHG

## 2025-01-08 DIAGNOSIS — R39.9 UNSPECIFIED SYMPTOMS AND SIGNS INVOLVING THE GENITOURINARY SYSTEM: ICD-10-CM

## 2025-01-08 DIAGNOSIS — N95.8 OTHER SPECIFIED MENOPAUSAL AND PERIMENOPAUSAL DISORDERS: ICD-10-CM

## 2025-01-08 DIAGNOSIS — N30.10 INTERSTITIAL CYSTITIS (CHRONIC) W/OUT HEMATURIA: ICD-10-CM

## 2025-01-08 PROCEDURE — 99214 OFFICE O/P EST MOD 30 MIN: CPT

## 2025-01-08 PROCEDURE — 51798 US URINE CAPACITY MEASURE: CPT

## 2025-01-09 LAB
APPEARANCE: CLEAR
BILIRUBIN URINE: NEGATIVE
BLOOD URINE: NEGATIVE
COLOR: YELLOW
GLUCOSE QUALITATIVE U: NEGATIVE MG/DL
KETONES URINE: ABNORMAL MG/DL
LEUKOCYTE ESTERASE URINE: NEGATIVE
NITRITE URINE: NEGATIVE
PH URINE: 5.5
PROTEIN URINE: NEGATIVE MG/DL
SPECIFIC GRAVITY URINE: 1.02
UROBILINOGEN URINE: 0.2 MG/DL

## 2025-03-10 ENCOUNTER — NON-APPOINTMENT (OUTPATIENT)
Age: 70
End: 2025-03-10

## 2025-03-10 ENCOUNTER — APPOINTMENT (OUTPATIENT)
Dept: OBGYN | Facility: CLINIC | Age: 70
End: 2025-03-10
Payer: COMMERCIAL

## 2025-03-10 VITALS
DIASTOLIC BLOOD PRESSURE: 76 MMHG | WEIGHT: 136 LBS | BODY MASS INDEX: 22.66 KG/M2 | HEIGHT: 65 IN | SYSTOLIC BLOOD PRESSURE: 112 MMHG

## 2025-03-10 DIAGNOSIS — Z01.419 ENCOUNTER FOR GYNECOLOGICAL EXAMINATION (GENERAL) (ROUTINE) W/OUT ABNORMAL FINDINGS: ICD-10-CM

## 2025-03-10 DIAGNOSIS — Z12.39 ENCOUNTER FOR OTHER SCREENING FOR MALIGNANT NEOPLASM OF BREAST: ICD-10-CM

## 2025-03-10 DIAGNOSIS — N95.2 POSTMENOPAUSAL ATROPHIC VAGINITIS: ICD-10-CM

## 2025-03-10 DIAGNOSIS — Z12.4 ENCOUNTER FOR SCREENING FOR MALIGNANT NEOPLASM OF CERVIX: ICD-10-CM

## 2025-03-10 PROCEDURE — 99397 PER PM REEVAL EST PAT 65+ YR: CPT

## 2025-03-15 LAB
CYTOLOGY CVX/VAG DOC THIN PREP: NORMAL
HPV HIGH+LOW RISK DNA PNL CVX: NOT DETECTED

## 2025-03-17 ENCOUNTER — NON-APPOINTMENT (OUTPATIENT)
Age: 70
End: 2025-03-17

## 2025-03-17 ENCOUNTER — APPOINTMENT (OUTPATIENT)
Dept: SPINE | Facility: CLINIC | Age: 70
End: 2025-03-17
Payer: MEDICARE

## 2025-03-17 VITALS
SYSTOLIC BLOOD PRESSURE: 111 MMHG | RESPIRATION RATE: 16 BRPM | DIASTOLIC BLOOD PRESSURE: 71 MMHG | WEIGHT: 146 LBS | HEART RATE: 83 BPM | HEIGHT: 65 IN | OXYGEN SATURATION: 97 % | BODY MASS INDEX: 24.32 KG/M2

## 2025-03-17 DIAGNOSIS — G95.9 DISEASE OF SPINAL CORD, UNSPECIFIED: ICD-10-CM

## 2025-03-17 PROCEDURE — 99213 OFFICE O/P EST LOW 20 MIN: CPT

## 2025-04-25 ENCOUNTER — RESULT REVIEW (OUTPATIENT)
Age: 70
End: 2025-04-25

## 2025-04-25 ENCOUNTER — OUTPATIENT (OUTPATIENT)
Dept: OUTPATIENT SERVICES | Facility: HOSPITAL | Age: 70
LOS: 1 days | End: 2025-04-25
Payer: MEDICARE

## 2025-04-25 ENCOUNTER — APPOINTMENT (OUTPATIENT)
Dept: MAMMOGRAPHY | Facility: CLINIC | Age: 70
End: 2025-04-25
Payer: MEDICARE

## 2025-04-25 DIAGNOSIS — Z98.890 OTHER SPECIFIED POSTPROCEDURAL STATES: Chronic | ICD-10-CM

## 2025-04-25 DIAGNOSIS — N80.9 ENDOMETRIOSIS, UNSPECIFIED: Chronic | ICD-10-CM

## 2025-04-25 DIAGNOSIS — Z98.49 CATARACT EXTRACTION STATUS, UNSPECIFIED EYE: Chronic | ICD-10-CM

## 2025-04-25 DIAGNOSIS — Z00.8 ENCOUNTER FOR OTHER GENERAL EXAMINATION: ICD-10-CM

## 2025-04-25 DIAGNOSIS — Z90.721 ACQUIRED ABSENCE OF OVARIES, UNILATERAL: Chronic | ICD-10-CM

## 2025-04-25 DIAGNOSIS — Z12.39 ENCOUNTER FOR OTHER SCREENING FOR MALIGNANT NEOPLASM OF BREAST: ICD-10-CM

## 2025-04-25 PROCEDURE — 77067 SCR MAMMO BI INCL CAD: CPT

## 2025-04-25 PROCEDURE — 77063 BREAST TOMOSYNTHESIS BI: CPT

## 2025-04-25 PROCEDURE — 77067 SCR MAMMO BI INCL CAD: CPT | Mod: 26

## 2025-04-25 PROCEDURE — 77063 BREAST TOMOSYNTHESIS BI: CPT | Mod: 26

## (undated) DEVICE — LIGHT SHIELD CORNEAL

## (undated) DEVICE — GLV 7 PROTEXIS (WHITE)

## (undated) DEVICE — SOL IRR BAL SALT + 500ML

## (undated) DEVICE — HOOK STRAIGHT SINKEY DISP

## (undated) DEVICE — SUT VICRYL 7-0 12" TG140-8 DA

## (undated) DEVICE — CANNULA MEDONE FLEXTIP 25G

## (undated) DEVICE — FORCEP GRIESHABER SERRATED 25G DISP

## (undated) DEVICE — KNIFE SLIT 3MM

## (undated) DEVICE — VENODYNE/SCD SLEEVE CALF MEDIUM

## (undated) DEVICE — NDL HYPO SAFE 22G X 1.5" (BLACK)

## (undated) DEVICE — CONSTELLATION TOTAL PLUS PAK 23G

## (undated) DEVICE — SYE-LASER - CONSTELLATION MACHINE #3 1101051201X: Type: DURABLE MEDICAL EQUIPMENT

## (undated) DEVICE — KNIFE MVR 20G

## (undated) DEVICE — WARMING BLANKET LOWER ADULT

## (undated) DEVICE — DRAPE STERI-DRAPE INCISE 13X13"

## (undated) DEVICE — DRSG MASTISOL

## (undated) DEVICE — SUT VICRYL 10-0 12" CS160-6 DA

## (undated) DEVICE — PACK VITRECTOMY